# Patient Record
Sex: MALE | Race: WHITE | HISPANIC OR LATINO | Employment: OTHER | URBAN - METROPOLITAN AREA
[De-identification: names, ages, dates, MRNs, and addresses within clinical notes are randomized per-mention and may not be internally consistent; named-entity substitution may affect disease eponyms.]

---

## 2017-03-16 ENCOUNTER — APPOINTMENT (OUTPATIENT)
Dept: LAB | Facility: HOSPITAL | Age: 37
End: 2017-03-16
Payer: COMMERCIAL

## 2017-03-16 ENCOUNTER — TRANSCRIBE ORDERS (OUTPATIENT)
Dept: LAB | Facility: HOSPITAL | Age: 37
End: 2017-03-16

## 2017-03-16 DIAGNOSIS — F31.9 BIPOLAR 1 DISORDER (HCC): Primary | ICD-10-CM

## 2017-03-16 DIAGNOSIS — F31.9 BIPOLAR 1 DISORDER (HCC): ICD-10-CM

## 2017-03-16 LAB
ALBUMIN SERPL BCP-MCNC: 4.1 G/DL (ref 3.5–5)
ALP SERPL-CCNC: 66 U/L (ref 46–116)
ALT SERPL W P-5'-P-CCNC: 29 U/L (ref 12–78)
AST SERPL W P-5'-P-CCNC: 10 U/L (ref 5–45)
BASOPHILS # BLD AUTO: 0.03 THOUSANDS/ΜL (ref 0–0.1)
BASOPHILS NFR BLD AUTO: 1 % (ref 0–1)
BILIRUB DIRECT SERPL-MCNC: 0.25 MG/DL (ref 0–0.2)
BILIRUB SERPL-MCNC: 1.44 MG/DL (ref 0.2–1)
CREAT SERPL-MCNC: 0.95 MG/DL (ref 0.6–1.3)
EOSINOPHIL # BLD AUTO: 0.31 THOUSAND/ΜL (ref 0–0.61)
EOSINOPHIL NFR BLD AUTO: 5 % (ref 0–6)
ERYTHROCYTE [DISTWIDTH] IN BLOOD BY AUTOMATED COUNT: 12.3 % (ref 11.6–15.1)
EST. AVERAGE GLUCOSE BLD GHB EST-MCNC: 114 MG/DL
GFR SERPL CREATININE-BSD FRML MDRD: >60 ML/MIN/1.73SQ M
HBA1C MFR BLD: 5.6 % (ref 4.2–6.3)
HCT VFR BLD AUTO: 46.3 % (ref 36.5–49.3)
HGB BLD-MCNC: 15.7 G/DL (ref 12–17)
LITHIUM SERPL-SCNC: <0.2 MMOL/L (ref 0.5–1)
LYMPHOCYTES # BLD AUTO: 2 THOUSANDS/ΜL (ref 0.6–4.47)
LYMPHOCYTES NFR BLD AUTO: 31 % (ref 14–44)
MCH RBC QN AUTO: 31.5 PG (ref 26.8–34.3)
MCHC RBC AUTO-ENTMCNC: 33.9 G/DL (ref 31.4–37.4)
MCV RBC AUTO: 93 FL (ref 82–98)
MONOCYTES # BLD AUTO: 0.34 THOUSAND/ΜL (ref 0.17–1.22)
MONOCYTES NFR BLD AUTO: 5 % (ref 4–12)
NEUTROPHILS # BLD AUTO: 3.84 THOUSANDS/ΜL (ref 1.85–7.62)
NEUTS SEG NFR BLD AUTO: 58 % (ref 43–75)
NRBC BLD AUTO-RTO: 0 /100 WBCS
PLATELET # BLD AUTO: 290 THOUSANDS/UL (ref 149–390)
PMV BLD AUTO: 10.8 FL (ref 8.9–12.7)
PROLACTIN SERPL-MCNC: 9.8 NG/ML (ref 2.5–17.4)
PROT SERPL-MCNC: 7.2 G/DL (ref 6.4–8.2)
RBC # BLD AUTO: 4.99 MILLION/UL (ref 3.88–5.62)
T3 SERPL-MCNC: 1 NG/ML (ref 0.6–1.8)
T4 SERPL-MCNC: 7.1 UG/DL (ref 4.7–13.3)
TSH SERPL DL<=0.05 MIU/L-ACNC: 1.49 UIU/ML (ref 0.36–3.74)
WBC # BLD AUTO: 6.53 THOUSAND/UL (ref 4.31–10.16)

## 2017-03-16 PROCEDURE — 84443 ASSAY THYROID STIM HORMONE: CPT

## 2017-03-16 PROCEDURE — 80076 HEPATIC FUNCTION PANEL: CPT

## 2017-03-16 PROCEDURE — 84436 ASSAY OF TOTAL THYROXINE: CPT | Performed by: PSYCHIATRY & NEUROLOGY

## 2017-03-16 PROCEDURE — 36415 COLL VENOUS BLD VENIPUNCTURE: CPT

## 2017-03-16 PROCEDURE — 84146 ASSAY OF PROLACTIN: CPT

## 2017-03-16 PROCEDURE — 80178 ASSAY OF LITHIUM: CPT | Performed by: PSYCHIATRY & NEUROLOGY

## 2017-03-16 PROCEDURE — 85025 COMPLETE CBC W/AUTO DIFF WBC: CPT

## 2017-03-16 PROCEDURE — 82565 ASSAY OF CREATININE: CPT

## 2017-03-16 PROCEDURE — 83036 HEMOGLOBIN GLYCOSYLATED A1C: CPT

## 2017-03-16 PROCEDURE — 84480 ASSAY TRIIODOTHYRONINE (T3): CPT | Performed by: PSYCHIATRY & NEUROLOGY

## 2017-06-15 ENCOUNTER — APPOINTMENT (EMERGENCY)
Dept: RADIOLOGY | Facility: HOSPITAL | Age: 37
End: 2017-06-15
Payer: COMMERCIAL

## 2017-06-15 ENCOUNTER — HOSPITAL ENCOUNTER (EMERGENCY)
Facility: HOSPITAL | Age: 37
Discharge: HOME | End: 2017-06-15
Attending: EMERGENCY MEDICINE
Payer: COMMERCIAL

## 2017-06-15 VITALS
TEMPERATURE: 96.9 F | HEART RATE: 71 BPM | DIASTOLIC BLOOD PRESSURE: 75 MMHG | RESPIRATION RATE: 18 BRPM | WEIGHT: 179 LBS | SYSTOLIC BLOOD PRESSURE: 129 MMHG | OXYGEN SATURATION: 98 %

## 2017-06-15 DIAGNOSIS — S92.901A FOOT FRACTURE, RIGHT: Primary | ICD-10-CM

## 2017-06-15 PROCEDURE — 73610 X-RAY EXAM OF ANKLE: CPT

## 2017-06-15 PROCEDURE — 99283 EMERGENCY DEPT VISIT LOW MDM: CPT

## 2017-06-15 PROCEDURE — 73590 X-RAY EXAM OF LOWER LEG: CPT

## 2017-06-15 PROCEDURE — 73630 X-RAY EXAM OF FOOT: CPT

## 2017-06-15 RX ORDER — OXYCODONE HYDROCHLORIDE AND ACETAMINOPHEN 5; 325 MG/1; MG/1
1 TABLET ORAL EVERY 6 HOURS PRN
Qty: 15 TABLET | Refills: 0 | Status: SHIPPED | OUTPATIENT
Start: 2017-06-15 | End: 2017-06-20

## 2017-06-15 RX ORDER — OXYCODONE HYDROCHLORIDE AND ACETAMINOPHEN 5; 325 MG/1; MG/1
1 TABLET ORAL ONCE
Status: COMPLETED | OUTPATIENT
Start: 2017-06-15 | End: 2017-06-15

## 2017-06-15 RX ADMIN — OXYCODONE HYDROCHLORIDE AND ACETAMINOPHEN 1 TABLET: 5; 325 TABLET ORAL at 21:48

## 2017-07-15 ENCOUNTER — APPOINTMENT (OUTPATIENT)
Dept: LAB | Facility: HOSPITAL | Age: 37
End: 2017-07-15
Payer: COMMERCIAL

## 2017-07-15 ENCOUNTER — TRANSCRIBE ORDERS (OUTPATIENT)
Dept: LAB | Facility: HOSPITAL | Age: 37
End: 2017-07-15

## 2017-07-15 DIAGNOSIS — F31.9 BIPOLAR 1 DISORDER (HCC): Primary | ICD-10-CM

## 2017-07-15 DIAGNOSIS — F31.9 BIPOLAR 1 DISORDER (HCC): ICD-10-CM

## 2017-07-15 LAB
ALBUMIN SERPL BCP-MCNC: 4.2 G/DL (ref 3.5–5)
ALP SERPL-CCNC: 68 U/L (ref 46–116)
ALT SERPL W P-5'-P-CCNC: 18 U/L (ref 12–78)
AST SERPL W P-5'-P-CCNC: 8 U/L (ref 5–45)
BASOPHILS # BLD AUTO: 0.03 THOUSANDS/ΜL (ref 0–0.1)
BASOPHILS NFR BLD AUTO: 1 % (ref 0–1)
BILIRUB DIRECT SERPL-MCNC: 0.26 MG/DL (ref 0–0.2)
BILIRUB SERPL-MCNC: 1.57 MG/DL (ref 0.2–1)
CREAT SERPL-MCNC: 0.96 MG/DL (ref 0.6–1.3)
EOSINOPHIL # BLD AUTO: 0.39 THOUSAND/ΜL (ref 0–0.61)
EOSINOPHIL NFR BLD AUTO: 6 % (ref 0–6)
ERYTHROCYTE [DISTWIDTH] IN BLOOD BY AUTOMATED COUNT: 12.3 % (ref 11.6–15.1)
EST. AVERAGE GLUCOSE BLD GHB EST-MCNC: 117 MG/DL
GFR SERPL CREATININE-BSD FRML MDRD: >60 ML/MIN/1.73SQ M
HBA1C MFR BLD: 5.7 % (ref 4.2–6.3)
HCT VFR BLD AUTO: 44.5 % (ref 36.5–49.3)
HGB BLD-MCNC: 15.6 G/DL (ref 12–17)
LITHIUM SERPL-SCNC: 0.2 MMOL/L (ref 0.5–1)
LYMPHOCYTES # BLD AUTO: 1.87 THOUSANDS/ΜL (ref 0.6–4.47)
LYMPHOCYTES NFR BLD AUTO: 30 % (ref 14–44)
MCH RBC QN AUTO: 32 PG (ref 26.8–34.3)
MCHC RBC AUTO-ENTMCNC: 35.1 G/DL (ref 31.4–37.4)
MCV RBC AUTO: 91 FL (ref 82–98)
MONOCYTES # BLD AUTO: 0.47 THOUSAND/ΜL (ref 0.17–1.22)
MONOCYTES NFR BLD AUTO: 7 % (ref 4–12)
NEUTROPHILS # BLD AUTO: 3.55 THOUSANDS/ΜL (ref 1.85–7.62)
NEUTS SEG NFR BLD AUTO: 56 % (ref 43–75)
NRBC BLD AUTO-RTO: 0 /100 WBCS
PLATELET # BLD AUTO: 264 THOUSANDS/UL (ref 149–390)
PMV BLD AUTO: 10.9 FL (ref 8.9–12.7)
PROLACTIN SERPL-MCNC: 10.7 NG/ML (ref 2.5–17.4)
PROT SERPL-MCNC: 7.2 G/DL (ref 6.4–8.2)
RBC # BLD AUTO: 4.88 MILLION/UL (ref 3.88–5.62)
T3 SERPL-MCNC: 1.1 NG/ML (ref 0.6–1.8)
T4 SERPL-MCNC: 9.6 UG/DL (ref 4.7–13.3)
TRIGL SERPL-MCNC: 149 MG/DL
TSH SERPL DL<=0.05 MIU/L-ACNC: 1.37 UIU/ML (ref 0.36–3.74)
WBC # BLD AUTO: 6.32 THOUSAND/UL (ref 4.31–10.16)

## 2017-07-15 PROCEDURE — 80076 HEPATIC FUNCTION PANEL: CPT

## 2017-07-15 PROCEDURE — 84436 ASSAY OF TOTAL THYROXINE: CPT

## 2017-07-15 PROCEDURE — 80178 ASSAY OF LITHIUM: CPT

## 2017-07-15 PROCEDURE — 36415 COLL VENOUS BLD VENIPUNCTURE: CPT

## 2017-07-15 PROCEDURE — 84480 ASSAY TRIIODOTHYRONINE (T3): CPT

## 2017-07-15 PROCEDURE — 83036 HEMOGLOBIN GLYCOSYLATED A1C: CPT

## 2017-07-15 PROCEDURE — 85025 COMPLETE CBC W/AUTO DIFF WBC: CPT

## 2017-07-15 PROCEDURE — 84146 ASSAY OF PROLACTIN: CPT

## 2017-07-15 PROCEDURE — 82565 ASSAY OF CREATININE: CPT

## 2017-07-15 PROCEDURE — 84443 ASSAY THYROID STIM HORMONE: CPT

## 2017-07-15 PROCEDURE — 84478 ASSAY OF TRIGLYCERIDES: CPT

## 2017-08-09 ENCOUNTER — HOSPITAL ENCOUNTER (EMERGENCY)
Facility: HOSPITAL | Age: 37
Discharge: HOME | End: 2017-08-09
Attending: EMERGENCY MEDICINE | Admitting: EMERGENCY MEDICINE
Payer: COMMERCIAL

## 2017-08-09 VITALS
BODY MASS INDEX: 28.66 KG/M2 | HEIGHT: 65 IN | DIASTOLIC BLOOD PRESSURE: 66 MMHG | WEIGHT: 172 LBS | HEART RATE: 75 BPM | TEMPERATURE: 98.3 F | SYSTOLIC BLOOD PRESSURE: 131 MMHG | OXYGEN SATURATION: 99 % | RESPIRATION RATE: 18 BRPM

## 2017-08-09 DIAGNOSIS — J20.9 ACUTE BRONCHITIS: Primary | ICD-10-CM

## 2017-08-09 DIAGNOSIS — R05.9 COUGH: ICD-10-CM

## 2017-08-09 PROCEDURE — 94640 AIRWAY INHALATION TREATMENT: CPT

## 2017-08-09 PROCEDURE — 99283 EMERGENCY DEPT VISIT LOW MDM: CPT

## 2017-08-09 PROCEDURE — 96372 THER/PROPH/DIAG INJ SC/IM: CPT

## 2017-08-09 PROCEDURE — 93005 ELECTROCARDIOGRAM TRACING: CPT | Performed by: EMERGENCY MEDICINE

## 2017-08-09 RX ORDER — PROMETHAZINE HYDROCHLORIDE AND CODEINE PHOSPHATE 6.25; 1 MG/5ML; MG/5ML
5 SYRUP ORAL EVERY 4 HOURS PRN
Qty: 120 ML | Refills: 0 | Status: SHIPPED | OUTPATIENT
Start: 2017-08-09 | End: 2017-08-10

## 2017-08-09 RX ORDER — ACETAMINOPHEN 325 MG/1
650 TABLET ORAL EVERY 6 HOURS PRN
Qty: 30 TABLET | Refills: 0 | Status: SHIPPED | OUTPATIENT
Start: 2017-08-09 | End: 2017-08-10

## 2017-08-09 RX ORDER — ALBUTEROL SULFATE 90 UG/1
1-2 AEROSOL, METERED RESPIRATORY (INHALATION) EVERY 6 HOURS PRN
Qty: 1 INHALER | Refills: 0 | Status: SHIPPED | OUTPATIENT
Start: 2017-08-09 | End: 2017-08-10

## 2017-08-09 RX ORDER — KETOROLAC TROMETHAMINE 30 MG/ML
30 INJECTION, SOLUTION INTRAMUSCULAR; INTRAVENOUS ONCE
Status: COMPLETED | OUTPATIENT
Start: 2017-08-09 | End: 2017-08-09

## 2017-08-09 RX ORDER — ACETAMINOPHEN 325 MG/1
975 TABLET ORAL ONCE
Status: COMPLETED | OUTPATIENT
Start: 2017-08-09 | End: 2017-08-09

## 2017-08-09 RX ORDER — ALBUTEROL SULFATE 2.5 MG/3ML
5 SOLUTION RESPIRATORY (INHALATION) ONCE
Status: COMPLETED | OUTPATIENT
Start: 2017-08-09 | End: 2017-08-09

## 2017-08-09 RX ORDER — IBUPROFEN 600 MG/1
600 TABLET ORAL EVERY 6 HOURS PRN
Qty: 30 TABLET | Refills: 0 | Status: SHIPPED | OUTPATIENT
Start: 2017-08-09 | End: 2017-08-10

## 2017-08-09 RX ADMIN — ACETAMINOPHEN 975 MG: 325 TABLET, FILM COATED ORAL at 22:02

## 2017-08-09 RX ADMIN — DEXAMETHASONE SODIUM PHOSPHATE 10 MG: 10 INJECTION, SOLUTION INTRAMUSCULAR; INTRAVENOUS at 22:02

## 2017-08-09 RX ADMIN — KETOROLAC TROMETHAMINE 30 MG: 30 INJECTION, SOLUTION INTRAMUSCULAR at 22:02

## 2017-08-09 RX ADMIN — ALBUTEROL SULFATE 5 MG: 2.5 SOLUTION RESPIRATORY (INHALATION) at 22:03

## 2017-08-09 RX ADMIN — IPRATROPIUM BROMIDE 0.5 MG: 0.5 SOLUTION RESPIRATORY (INHALATION) at 22:03

## 2017-08-10 ENCOUNTER — HOSPITAL ENCOUNTER (EMERGENCY)
Facility: HOSPITAL | Age: 37
Discharge: HOME | End: 2017-08-10
Attending: EMERGENCY MEDICINE
Payer: COMMERCIAL

## 2017-08-10 VITALS
TEMPERATURE: 98.7 F | BODY MASS INDEX: 28.62 KG/M2 | SYSTOLIC BLOOD PRESSURE: 122 MMHG | WEIGHT: 172 LBS | HEART RATE: 84 BPM | RESPIRATION RATE: 20 BRPM | OXYGEN SATURATION: 97 % | DIASTOLIC BLOOD PRESSURE: 67 MMHG

## 2017-08-10 DIAGNOSIS — R11.2 NAUSEA AND VOMITING: Primary | ICD-10-CM

## 2017-08-10 LAB
ATRIAL RATE: 68 BPM
P AXIS: 59 DEGREES
PR INTERVAL: 184 MS
QRS AXIS: 69 DEGREES
QRSD INTERVAL: 110 MS
QT INTERVAL: 368 MS
QTC INTERVAL: 391 MS
T WAVE AXIS: 29 DEGREES
VENTRICULAR RATE: 68 BPM

## 2017-08-10 PROCEDURE — 96374 THER/PROPH/DIAG INJ IV PUSH: CPT

## 2017-08-10 PROCEDURE — 99283 EMERGENCY DEPT VISIT LOW MDM: CPT

## 2017-08-10 RX ORDER — ONDANSETRON 2 MG/ML
4 INJECTION INTRAMUSCULAR; INTRAVENOUS ONCE
Status: COMPLETED | OUTPATIENT
Start: 2017-08-10 | End: 2017-08-10

## 2017-08-10 RX ORDER — ONDANSETRON 4 MG/1
4 TABLET, FILM COATED ORAL EVERY 8 HOURS PRN
Qty: 12 TABLET | Refills: 0 | Status: SHIPPED | OUTPATIENT
Start: 2017-08-10 | End: 2019-07-30

## 2017-08-10 RX ORDER — ONDANSETRON 2 MG/ML
INJECTION INTRAMUSCULAR; INTRAVENOUS
Status: COMPLETED
Start: 2017-08-10 | End: 2017-08-10

## 2017-08-10 RX ADMIN — ONDANSETRON 4 MG: 2 INJECTION INTRAMUSCULAR; INTRAVENOUS at 22:51

## 2017-12-21 ENCOUNTER — TRANSCRIBE ORDERS (OUTPATIENT)
Dept: LAB | Facility: HOSPITAL | Age: 37
End: 2017-12-21

## 2017-12-21 ENCOUNTER — APPOINTMENT (OUTPATIENT)
Dept: LAB | Facility: HOSPITAL | Age: 37
End: 2017-12-21
Payer: COMMERCIAL

## 2017-12-21 DIAGNOSIS — F31.9 BIPOLAR 1 DISORDER (HCC): Primary | ICD-10-CM

## 2017-12-21 DIAGNOSIS — F31.9 BIPOLAR 1 DISORDER (HCC): ICD-10-CM

## 2017-12-21 LAB
ALBUMIN SERPL BCP-MCNC: 4.3 G/DL (ref 3.5–5)
ALP SERPL-CCNC: 66 U/L (ref 46–116)
ALT SERPL W P-5'-P-CCNC: 25 U/L (ref 12–78)
AST SERPL W P-5'-P-CCNC: 14 U/L (ref 5–45)
BASOPHILS # BLD AUTO: 0.03 THOUSANDS/ΜL (ref 0–0.1)
BASOPHILS NFR BLD AUTO: 1 % (ref 0–1)
BILIRUB DIRECT SERPL-MCNC: 0.26 MG/DL (ref 0–0.2)
BILIRUB SERPL-MCNC: 1.55 MG/DL (ref 0.2–1)
CREAT SERPL-MCNC: 0.92 MG/DL (ref 0.6–1.3)
EOSINOPHIL # BLD AUTO: 0.29 THOUSAND/ΜL (ref 0–0.61)
EOSINOPHIL NFR BLD AUTO: 5 % (ref 0–6)
ERYTHROCYTE [DISTWIDTH] IN BLOOD BY AUTOMATED COUNT: 12.7 % (ref 11.6–15.1)
EST. AVERAGE GLUCOSE BLD GHB EST-MCNC: 117 MG/DL
GFR SERPL CREATININE-BSD FRML MDRD: 104 ML/MIN/1.73SQ M
HBA1C MFR BLD: 5.7 % (ref 4.2–6.3)
HCT VFR BLD AUTO: 47.1 % (ref 36.5–49.3)
HGB BLD-MCNC: 16 G/DL (ref 12–17)
LITHIUM SERPL-SCNC: 0.3 MMOL/L (ref 0.5–1)
LYMPHOCYTES # BLD AUTO: 1.74 THOUSANDS/ΜL (ref 0.6–4.47)
LYMPHOCYTES NFR BLD AUTO: 30 % (ref 14–44)
MCH RBC QN AUTO: 31.8 PG (ref 26.8–34.3)
MCHC RBC AUTO-ENTMCNC: 34 G/DL (ref 31.4–37.4)
MCV RBC AUTO: 94 FL (ref 82–98)
MONOCYTES # BLD AUTO: 0.39 THOUSAND/ΜL (ref 0.17–1.22)
MONOCYTES NFR BLD AUTO: 7 % (ref 4–12)
NEUTROPHILS # BLD AUTO: 3.37 THOUSANDS/ΜL (ref 1.85–7.62)
NEUTS SEG NFR BLD AUTO: 57 % (ref 43–75)
NRBC BLD AUTO-RTO: 0 /100 WBCS
PLATELET # BLD AUTO: 290 THOUSANDS/UL (ref 149–390)
PMV BLD AUTO: 11.1 FL (ref 8.9–12.7)
PROLACTIN SERPL-MCNC: 13.7 NG/ML (ref 2.5–17.4)
PROT SERPL-MCNC: 7.6 G/DL (ref 6.4–8.2)
RBC # BLD AUTO: 5.03 MILLION/UL (ref 3.88–5.62)
T3 SERPL-MCNC: 1.1 NG/ML (ref 0.6–1.8)
T4 SERPL-MCNC: 9.5 UG/DL (ref 4.7–13.3)
TRIGL SERPL-MCNC: 110 MG/DL
TSH SERPL DL<=0.05 MIU/L-ACNC: 3.1 UIU/ML (ref 0.36–3.74)
WBC # BLD AUTO: 5.83 THOUSAND/UL (ref 4.31–10.16)

## 2017-12-21 PROCEDURE — 80178 ASSAY OF LITHIUM: CPT

## 2017-12-21 PROCEDURE — 84478 ASSAY OF TRIGLYCERIDES: CPT

## 2017-12-21 PROCEDURE — 84443 ASSAY THYROID STIM HORMONE: CPT

## 2017-12-21 PROCEDURE — 80076 HEPATIC FUNCTION PANEL: CPT

## 2017-12-21 PROCEDURE — 85025 COMPLETE CBC W/AUTO DIFF WBC: CPT

## 2017-12-21 PROCEDURE — 83036 HEMOGLOBIN GLYCOSYLATED A1C: CPT

## 2017-12-21 PROCEDURE — 84436 ASSAY OF TOTAL THYROXINE: CPT

## 2017-12-21 PROCEDURE — 36415 COLL VENOUS BLD VENIPUNCTURE: CPT

## 2017-12-21 PROCEDURE — 82565 ASSAY OF CREATININE: CPT

## 2017-12-21 PROCEDURE — 84146 ASSAY OF PROLACTIN: CPT

## 2017-12-21 PROCEDURE — 84480 ASSAY TRIIODOTHYRONINE (T3): CPT

## 2018-11-21 ENCOUNTER — APPOINTMENT (OUTPATIENT)
Dept: LAB | Facility: HOSPITAL | Age: 38
End: 2018-11-21
Payer: COMMERCIAL

## 2018-11-21 ENCOUNTER — TRANSCRIBE ORDERS (OUTPATIENT)
Dept: LAB | Facility: HOSPITAL | Age: 38
End: 2018-11-21

## 2018-11-21 DIAGNOSIS — Z13.6 SCREENING FOR ISCHEMIC HEART DISEASE: ICD-10-CM
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
## 2018-11-21 LAB
CHOLEST SERPL-MCNC: 166 MG/DL (ref 50–200)
HDLC SERPL-MCNC: 29 MG/DL (ref 40–60)
LDLC SERPL CALC-MCNC: 119 MG/DL (ref 0–100)
NONHDLC SERPL-MCNC: 137 MG/DL
TRIGL SERPL-MCNC: 90 MG/DL

## 2018-11-21 PROCEDURE — 36415 COLL VENOUS BLD VENIPUNCTURE: CPT

## 2018-11-21 PROCEDURE — 80061 LIPID PANEL: CPT

## 2019-04-01 ENCOUNTER — APPOINTMENT (OUTPATIENT)
Dept: LAB | Facility: CLINIC | Age: 39
End: 2019-04-01
Payer: MEDICARE

## 2019-04-01 ENCOUNTER — TRANSCRIBE ORDERS (OUTPATIENT)
Dept: LAB | Facility: CLINIC | Age: 39
End: 2019-04-01

## 2019-04-01 DIAGNOSIS — J45.50 SEVERE PERSISTENT ASTHMA, UNSPECIFIED WHETHER COMPLICATED: Primary | ICD-10-CM

## 2019-04-01 DIAGNOSIS — J45.50 SEVERE PERSISTENT ASTHMA, UNSPECIFIED WHETHER COMPLICATED: ICD-10-CM

## 2019-04-01 LAB
25(OH)D3 SERPL-MCNC: 19.5 NG/ML (ref 30–100)
ALBUMIN SERPL BCP-MCNC: 4.3 G/DL (ref 3.5–5)
ALP SERPL-CCNC: 72 U/L (ref 46–116)
ALT SERPL W P-5'-P-CCNC: 21 U/L (ref 12–78)
ANION GAP SERPL CALCULATED.3IONS-SCNC: 4 MMOL/L (ref 4–13)
AST SERPL W P-5'-P-CCNC: 10 U/L (ref 5–45)
BASOPHILS # BLD AUTO: 0.07 THOUSANDS/ΜL (ref 0–0.1)
BASOPHILS NFR BLD AUTO: 1 % (ref 0–1)
BILIRUB SERPL-MCNC: 1.22 MG/DL (ref 0.2–1)
BUN SERPL-MCNC: 7 MG/DL (ref 5–25)
CALCIUM SERPL-MCNC: 8.9 MG/DL (ref 8.3–10.1)
CHLORIDE SERPL-SCNC: 105 MMOL/L (ref 100–108)
CO2 SERPL-SCNC: 29 MMOL/L (ref 21–32)
CREAT SERPL-MCNC: 0.8 MG/DL (ref 0.6–1.3)
EOSINOPHIL # BLD AUTO: 0.2 THOUSAND/ΜL (ref 0–0.61)
EOSINOPHIL NFR BLD AUTO: 3 % (ref 0–6)
ERYTHROCYTE [DISTWIDTH] IN BLOOD BY AUTOMATED COUNT: 12.1 % (ref 11.6–15.1)
GFR SERPL CREATININE-BSD FRML MDRD: 111 ML/MIN/1.73SQ M
GLUCOSE SERPL-MCNC: 85 MG/DL (ref 65–140)
HCT VFR BLD AUTO: 44.7 % (ref 36.5–49.3)
HGB BLD-MCNC: 14.9 G/DL (ref 12–17)
IMM GRANULOCYTES # BLD AUTO: 0.01 THOUSAND/UL (ref 0–0.2)
IMM GRANULOCYTES NFR BLD AUTO: 0 % (ref 0–2)
LYMPHOCYTES # BLD AUTO: 1.88 THOUSANDS/ΜL (ref 0.6–4.47)
LYMPHOCYTES NFR BLD AUTO: 32 % (ref 14–44)
MCH RBC QN AUTO: 31.2 PG (ref 26.8–34.3)
MCHC RBC AUTO-ENTMCNC: 33.3 G/DL (ref 31.4–37.4)
MCV RBC AUTO: 94 FL (ref 82–98)
MONOCYTES # BLD AUTO: 0.4 THOUSAND/ΜL (ref 0.17–1.22)
MONOCYTES NFR BLD AUTO: 7 % (ref 4–12)
NEUTROPHILS # BLD AUTO: 3.26 THOUSANDS/ΜL (ref 1.85–7.62)
NEUTS SEG NFR BLD AUTO: 57 % (ref 43–75)
NRBC BLD AUTO-RTO: 0 /100 WBCS
PLATELET # BLD AUTO: 260 THOUSANDS/UL (ref 149–390)
PMV BLD AUTO: 10.8 FL (ref 8.9–12.7)
POTASSIUM SERPL-SCNC: 3.8 MMOL/L (ref 3.5–5.3)
PROT SERPL-MCNC: 7 G/DL (ref 6.4–8.2)
RBC # BLD AUTO: 4.77 MILLION/UL (ref 3.88–5.62)
SODIUM SERPL-SCNC: 138 MMOL/L (ref 136–145)
WBC # BLD AUTO: 5.82 THOUSAND/UL (ref 4.31–10.16)

## 2019-04-01 PROCEDURE — 86003 ALLG SPEC IGE CRUDE XTRC EA: CPT

## 2019-04-01 PROCEDURE — 85025 COMPLETE CBC W/AUTO DIFF WBC: CPT

## 2019-04-01 PROCEDURE — 82785 ASSAY OF IGE: CPT

## 2019-04-01 PROCEDURE — 82306 VITAMIN D 25 HYDROXY: CPT

## 2019-04-01 PROCEDURE — 80053 COMPREHEN METABOLIC PANEL: CPT

## 2019-04-01 PROCEDURE — 36415 COLL VENOUS BLD VENIPUNCTURE: CPT

## 2019-04-02 LAB
A ALTERNATA IGE QN: <0.1 KUA/I
A FUMIGATUS IGE QN: <0.1 KUA/I
ALLERGEN COMMENT: NORMAL
BERMUDA GRASS IGE QN: <0.1 KUA/I
BOXELDER IGE QN: <0.1 KUA/I
C HERBARUM IGE QN: <0.1 KUA/I
CAT DANDER IGE QN: <0.1 KUA/I
CMN PIGWEED IGE QN: <0.1 KUA/I
COMMON RAGWEED IGE QN: <0.1 KUA/I
COTTONWOOD IGE QN: <0.1 KUA/I
D FARINAE IGE QN: <0.1 KUA/I
D PTERONYSS IGE QN: <0.1 KUA/I
DOG DANDER IGE QN: <0.1 KUA/I
LONDON PLANE IGE QN: <0.1 KUA/I
MOUSE URINE PROT IGE QN: <0.1 KUA/I
MT JUNIPER IGE QN: <0.1 KUA/I
MUGWORT IGE QN: <0.1 KUA/I
P NOTATUM IGE QN: <0.1 KUA/I
ROACH IGE QN: <0.1 KUA/I
SHEEP SORREL IGE QN: <0.1 KUA/I
SILVER BIRCH IGE QN: <0.1 KUA/I
TIMOTHY IGE QN: <0.1 KUA/I
TOTAL IGE SMQN RAST: 20.6 KU/L (ref 0–113)
WALNUT IGE QN: <0.1 KUA/I
WHITE ASH IGE QN: <0.1 KUA/I
WHITE ELM IGE QN: <0.1 KUA/I
WHITE MULBERRY IGE QN: <0.1 KUA/I
WHITE OAK IGE QN: <0.1 KUA/I

## 2019-07-30 ENCOUNTER — HOSPITAL ENCOUNTER (EMERGENCY)
Facility: HOSPITAL | Age: 39
Discharge: HOME | End: 2019-07-30
Attending: EMERGENCY MEDICINE
Payer: MEDICARE

## 2019-07-30 VITALS
RESPIRATION RATE: 18 BRPM | DIASTOLIC BLOOD PRESSURE: 84 MMHG | WEIGHT: 183.2 LBS | BODY MASS INDEX: 30.49 KG/M2 | SYSTOLIC BLOOD PRESSURE: 150 MMHG | TEMPERATURE: 97.9 F | OXYGEN SATURATION: 97 % | HEART RATE: 88 BPM

## 2019-07-30 DIAGNOSIS — J02.9 PHARYNGITIS: Primary | ICD-10-CM

## 2019-07-30 LAB — S PYO AG THROAT QL: NEGATIVE

## 2019-07-30 PROCEDURE — 99283 EMERGENCY DEPT VISIT LOW MDM: CPT | Performed by: PHYSICIAN ASSISTANT

## 2019-07-30 PROCEDURE — 99283 EMERGENCY DEPT VISIT LOW MDM: CPT

## 2019-07-30 PROCEDURE — 87430 STREP A AG IA: CPT | Performed by: PHYSICIAN ASSISTANT

## 2019-07-30 RX ORDER — ALBUTEROL SULFATE 90 UG/1
1 AEROSOL, METERED RESPIRATORY (INHALATION) EVERY 6 HOURS PRN
COMMUNITY
Start: 2019-03-11

## 2019-07-30 RX ORDER — FLUTICASONE FUROATE AND VILANTEROL 200; 25 UG/1; UG/1
1 POWDER RESPIRATORY (INHALATION) DAILY
COMMUNITY
Start: 2018-08-20

## 2019-07-30 RX ORDER — ACETAMINOPHEN 500 MG
500 TABLET ORAL EVERY 6 HOURS PRN
Qty: 30 TABLET | Refills: 0 | Status: SHIPPED | OUTPATIENT
Start: 2019-07-30

## 2019-07-30 RX ORDER — CITALOPRAM 20 MG/1
20 TABLET ORAL DAILY
COMMUNITY

## 2019-07-30 NOTE — ED PROVIDER NOTES
History  Chief Complaint   Patient presents with    Sore Throat     Patient complains of sore throat since yesterday  Denies any cough or fevers  [facility] is a 38 yo M presenting with sore throat x1 day  States symptoms began gradually and have worsened since onset  Denies rhinorrhea, nasal congestion, cough  Denies fevers/chills  No meds PTA for pain  No known sick contacts  No recent travel  Has tried throat lozenges with mild relief  History provided by:  Patient   used: No    Sore Throat   Location:  Generalized  Quality:  Sore  Onset quality:  Gradual  Duration:  1 day  Timing:  Constant  Progression:  Worsening  Chronicity:  New  Relieved by:  OTC medications  Associated symptoms: no abdominal pain, no chest pain, no chills, no cough, no ear discharge, no ear pain, no fever, no headaches, no neck stiffness, no rash, no rhinorrhea, no shortness of breath, no trouble swallowing and no voice change    Risk factors: no exposure to strep, no exposure to mono and no sick contacts        Prior to Admission Medications   Prescriptions Last Dose Informant Patient Reported? Taking? albuterol (VENTOLIN HFA) 90 mcg/act inhaler   Yes Yes   Sig: Inhale 1 puff every 6 (six) hours as needed   citalopram (CeleXA) 20 mg tablet   Yes No   Sig: Take 20 mg by mouth daily   fluticasone-vilanterol (BREO ELLIPTA) 200-25 MCG/INH inhaler   Yes Yes   Sig: Inhale 1 puff daily   tiotropium (SPIRIVA RESPIMAT) 1 25 MCG/ACT AERS inhaler   Yes Yes   Sig: Inhale 2 5 mcg daily      Facility-Administered Medications: None       Past Medical History:   Diagnosis Date    Asthma     Mitral valve prolapse        History reviewed  No pertinent surgical history  History reviewed  No pertinent family history  I have reviewed and agree with the history as documented      Social History     Tobacco Use    Smoking status: Never Smoker    Smokeless tobacco: Never Used   Substance Use Topics    Alcohol use: Not Currently     Comment: socially    Drug use: No        Review of Systems   Constitutional: Negative for chills and fever  HENT: Positive for sore throat  Negative for congestion, ear discharge, ear pain, mouth sores, rhinorrhea, sinus pressure, sinus pain, trouble swallowing and voice change  Eyes: Negative for pain, redness and visual disturbance  Respiratory: Negative for cough, chest tightness, shortness of breath and wheezing  Cardiovascular: Negative for chest pain and palpitations  Gastrointestinal: Negative for abdominal pain, constipation, diarrhea, nausea and vomiting  Genitourinary: Negative for dysuria, frequency and urgency  Musculoskeletal: Negative for myalgias, neck pain and neck stiffness  Skin: Negative for pallor, rash and wound  Neurological: Negative for dizziness, weakness, light-headedness, numbness and headaches  Physical Exam  Physical Exam   Constitutional: He is oriented to person, place, and time  He appears well-developed and well-nourished  No distress  HENT:   Head: Normocephalic and atraumatic  Right Ear: Tympanic membrane, external ear and ear canal normal    Left Ear: Tympanic membrane, external ear and ear canal normal    Nose: Nose normal    Mouth/Throat: Posterior oropharyngeal erythema present  No oropharyngeal exudate, posterior oropharyngeal edema or tonsillar abscesses  Tonsils are 0 on the right  Tonsils are 0 on the left  No tonsillar exudate  Eyes: Pupils are equal, round, and reactive to light  Conjunctivae and EOM are normal  Right eye exhibits no discharge  Left eye exhibits no discharge  Neck: Normal range of motion  Neck supple  Cardiovascular: Normal rate, regular rhythm and normal heart sounds  Exam reveals no gallop and no friction rub  No murmur heard  Pulmonary/Chest: Effort normal and breath sounds normal  No stridor  No respiratory distress  He has no wheezes  He has no rales  Abdominal: Soft   Bowel sounds are normal  He exhibits no distension  There is no tenderness  There is no guarding  Lymphadenopathy:     He has no cervical adenopathy  Neurological: He is alert and oriented to person, place, and time  He exhibits normal muscle tone  Coordination normal    Skin: Skin is warm and dry  Capillary refill takes less than 2 seconds  No rash noted  He is not diaphoretic  No erythema  No pallor  Psychiatric: He has a normal mood and affect  His behavior is normal  Judgment and thought content normal    Nursing note and vitals reviewed  Vital Signs  ED Triage Vitals [07/30/19 0934]   Temperature Pulse Respirations Blood Pressure SpO2   97 9 °F (36 6 °C) 88 18 150/84 97 %      Temp Source Heart Rate Source Patient Position - Orthostatic VS BP Location FiO2 (%)   Oral Monitor Sitting Right arm --      Pain Score       Worst Possible Pain           Vitals:    07/30/19 0934   BP: 150/84   Pulse: 88   Patient Position - Orthostatic VS: Sitting         Visual Acuity      ED Medications  Medications - No data to display    Diagnostic Studies  Results Reviewed     Procedure Component Value Units Date/Time    Rapid Strep A Screen Only, Adults [[id number]]  (Normal) Collected:  07/30/19 1006    Lab Status:  Final result Specimen:  Throat Updated:  07/30/19 1027     Rapid Strep A Screen Negative                 No orders to display              Procedures  Procedures       ED Course                               MDM  Number of Diagnoses or Management Options  Pharyngitis:   Diagnosis management comments: Negative rapid strep, Centor score of 1  No evidence of PTA, handling secretions, afebrile   Supportive therapy at home, f/u with PCP if symptoms persist        Amount and/or Complexity of Data Reviewed  Clinical lab tests: ordered and reviewed    Patient Progress  Patient progress: stable      Disposition  Final diagnoses:   Pharyngitis     Time reflects when diagnosis was documented in both MDM as applicable and the Disposition within this note     Time User Action Codes Description Comment    7/30/2019 10:43 AM Rosa Isela Sosa Add [J02 9] Pharyngitis       ED Disposition     ED Disposition Condition Date/Time Comment    Discharge Stable Tue Jul 30, 2019 10:43 AM Eliodoro Sees reyes discharge to home/self care  Follow-up Information     Follow up With Specialties Details Why [address], DO Family Medicine  As needed [address] [facility] [address]  [phone]            Patient's Medications   Discharge Prescriptions    ACETAMINOPHEN (TYLENOL) 500 MG TABLET    Take 1 tablet (500 mg total) by mouth every 6 (six) hours as needed for mild pain or moderate pain       Start Date: 7/30/2019 End Date: --       Order Dose: 500 mg       Quantity: 30 tablet    Refills: 0    MENTHOL-CETYLPYRIDINIUM (CEPACOL) 3 MG LOZENGE    Take 1 lozenge (3 mg total) by mouth as needed for sore throat       Start Date: 7/30/2019 End Date: --       Order Dose: 3 mg       Quantity: 30 lozenge    Refills: 0     No discharge procedures on file      ED Provider  Electronically Signed by           Marie Troy PA-C  07/30/19 5606

## 2019-09-25 ENCOUNTER — HOSPITAL ENCOUNTER (EMERGENCY)
Facility: HOSPITAL | Age: 39
Discharge: HOME | End: 2019-09-25
Attending: EMERGENCY MEDICINE | Admitting: EMERGENCY MEDICINE
Payer: MEDICARE

## 2019-09-25 VITALS
TEMPERATURE: 98.4 F | OXYGEN SATURATION: 99 % | WEIGHT: 186 LBS | HEART RATE: 80 BPM | DIASTOLIC BLOOD PRESSURE: 81 MMHG | BODY MASS INDEX: 30.95 KG/M2 | RESPIRATION RATE: 18 BRPM | SYSTOLIC BLOOD PRESSURE: 134 MMHG

## 2019-09-25 DIAGNOSIS — M54.50 ACUTE LOW BACK PAIN: Primary | ICD-10-CM

## 2019-09-25 DIAGNOSIS — M62.830 SPASM OF MUSCLE OF LOWER BACK: ICD-10-CM

## 2019-09-25 PROCEDURE — 99284 EMERGENCY DEPT VISIT MOD MDM: CPT | Performed by: PHYSICIAN ASSISTANT

## 2019-09-25 PROCEDURE — 96372 THER/PROPH/DIAG INJ SC/IM: CPT

## 2019-09-25 PROCEDURE — 99283 EMERGENCY DEPT VISIT LOW MDM: CPT

## 2019-09-25 RX ORDER — METHYLPREDNISOLONE 4 MG/1
TABLET ORAL
Qty: 21 TABLET | Refills: 0 | Status: SHIPPED | OUTPATIENT
Start: 2019-09-25 | End: 2020-01-31

## 2019-09-25 RX ORDER — DIAZEPAM 5 MG/ML
5 INJECTION, SOLUTION INTRAMUSCULAR; INTRAVENOUS ONCE
Status: COMPLETED | OUTPATIENT
Start: 2019-09-25 | End: 2019-09-25

## 2019-09-25 RX ORDER — KETOROLAC TROMETHAMINE 30 MG/ML
15 INJECTION, SOLUTION INTRAMUSCULAR; INTRAVENOUS ONCE
Status: COMPLETED | OUTPATIENT
Start: 2019-09-25 | End: 2019-09-25

## 2019-09-25 RX ORDER — BACLOFEN 10 MG/1
10 TABLET ORAL 3 TIMES DAILY
Qty: 12 TABLET | Refills: 0 | Status: SHIPPED | OUTPATIENT
Start: 2019-09-25 | End: 2019-09-29

## 2019-09-25 RX ADMIN — DIAZEPAM 5 MG: 10 INJECTION, SOLUTION INTRAMUSCULAR; INTRAVENOUS at 12:02

## 2019-09-25 RX ADMIN — KETOROLAC TROMETHAMINE 15 MG: 30 INJECTION, SOLUTION INTRAMUSCULAR at 12:01

## 2019-09-25 NOTE — ED PROVIDER NOTES
History  Chief Complaint   Patient presents with    Back Pain     Pt c/o L lower back pain with radiation to the middle  Pain x1 day  This is a 68-year-old male patient who yesterday was putting on his pants and stated his back gave out has pain to right side of his back referred symptoms into his right buttock no radicular symptoms  Nursing notes stated that it radiated up that does not seem to be the case  No change in bowel or bladder no saddle anesthesia no numbness or tingling no weakness  Made worse with movement made better when he holds still  Has taken nothing for the pain  No fever no chills no headache blurred vision double vision cough congestion sore throat nausea vomiting diarrhea abdominal pain no chest pain or shortness of breath  This pain is not sharp or tearing  No urgency frequency or dysuria no CVA tenderness          Prior to Admission Medications   Prescriptions Last Dose Informant Patient Reported? Taking?   acetaminophen (TYLENOL) 500 mg tablet   No No   Sig: Take 1 tablet (500 mg total) by mouth every 6 (six) hours as needed for mild pain or moderate pain   albuterol (VENTOLIN HFA) 90 mcg/act inhaler   Yes No   Sig: Inhale 1 puff every 6 (six) hours as needed   citalopram (CeleXA) 20 mg tablet   Yes No   Sig: Take 20 mg by mouth daily   fluticasone-vilanterol (BREO ELLIPTA) 200-25 MCG/INH inhaler   Yes No   Sig: Inhale 1 puff daily   menthol-cetylpyridinium (CEPACOL) 3 MG lozenge   No No   Sig: Take 1 lozenge (3 mg total) by mouth as needed for sore throat   tiotropium (SPIRIVA RESPIMAT) 1 25 MCG/ACT AERS inhaler   Yes No   Sig: Inhale 2 5 mcg daily      Facility-Administered Medications: None       Past Medical History:   Diagnosis Date    Asthma     Mitral valve prolapse        History reviewed  No pertinent surgical history  History reviewed  No pertinent family history  I have reviewed and agree with the history as documented      Social History     Tobacco Use    Smoking status: Never Smoker    Smokeless tobacco: Never Used   Substance Use Topics    Alcohol use: Not Currently     Comment: socially    Drug use: No        Review of Systems   All other systems reviewed and are negative  Physical Exam  Physical Exam   Constitutional: He appears well-developed and well-nourished  HENT:   Head: Normocephalic and atraumatic  Right Ear: External ear normal    Left Ear: External ear normal    Nose: Nose normal    Mouth/Throat: Oropharynx is clear and moist    Eyes: Pupils are equal, round, and reactive to light  Conjunctivae are normal    Neck: Normal range of motion  Neck supple  Cardiovascular: Normal rate and regular rhythm  Pulmonary/Chest: Effort normal and breath sounds normal    Abdominal: Soft  Bowel sounds are normal  There is no tenderness  Musculoskeletal:        Back:    Neurological: He is alert  Skin: Skin is warm  Psychiatric: He has a normal mood and affect  His behavior is normal    Nursing note and vitals reviewed        Vital Signs  ED Triage Vitals [09/25/19 1105]   Temperature Pulse Respirations Blood Pressure SpO2   98 3 °F (36 8 °C) 77 20 147/82 97 %      Temp Source Heart Rate Source Patient Position - Orthostatic VS BP Location FiO2 (%)   Oral Monitor Sitting Left arm --      Pain Score       Worst Possible Pain           Vitals:    09/25/19 1105 09/25/19 1111   BP: 147/82 134/81   Pulse: 77 80   Patient Position - Orthostatic VS: Sitting Sitting         Visual Acuity      ED Medications  Medications   diazepam (VALIUM) injection 5 mg (has no administration in time range)   ketorolac (TORADOL) injection 15 mg (has no administration in time range)       Diagnostic Studies  Results Reviewed     None                 No orders to display              Procedures  Procedures       ED Course                               MDM    Disposition  Final diagnoses:   Acute low back pain   Spasm of muscle of lower back     Time reflects when diagnosis was documented in both MDM as applicable and the Disposition within this note     Time User Action Codes Description Comment    9/25/2019 11:55 AM Xavi Ruelas [M54 5] Acute low back pain     9/25/2019 11:55 AM Xavi Ruelas [M62 830] Spasm of muscle of lower back       ED Disposition     ED Disposition Condition Date/Time Comment    Discharge Stable Wed Sep 25, 2019 11:31 AM Minnette Oliphant reyes discharge to home/self care  Follow-up Information     Follow up With Specialties Details Why [address] [facility]   [address] [place] [address]  [phone]            Patient's Medications   Discharge Prescriptions    BACLOFEN 10 MG TABLET    Take 1 tablet (10 mg total) by mouth 3 (three) times a day for 4 days       Start Date: 9/25/2019 End Date: 9/29/2019       Order Dose: 10 mg       Quantity: 12 tablet    Refills: 0    METHYLPREDNISOLONE 4 MG TABLET THERAPY PACK    Use as directed on package       Start Date: 9/25/2019 End Date: --       Order Dose: --       Quantity: 21 tablet    Refills: 0     No discharge procedures on file      ED Provider  Electronically Signed by           Darren Palma PA-C  09/25/19 9505

## 2019-09-27 ENCOUNTER — HOSPITAL ENCOUNTER (EMERGENCY)
Facility: HOSPITAL | Age: 39
Discharge: HOME | End: 2019-09-27
Attending: EMERGENCY MEDICINE
Payer: MEDICARE

## 2019-09-27 VITALS
OXYGEN SATURATION: 100 % | HEART RATE: 78 BPM | TEMPERATURE: 98.2 F | RESPIRATION RATE: 18 BRPM | SYSTOLIC BLOOD PRESSURE: 136 MMHG | DIASTOLIC BLOOD PRESSURE: 66 MMHG

## 2019-09-27 DIAGNOSIS — M54.50 LOW BACK PAIN: Primary | ICD-10-CM

## 2019-09-27 PROCEDURE — 99283 EMERGENCY DEPT VISIT LOW MDM: CPT

## 2019-09-27 PROCEDURE — 99284 EMERGENCY DEPT VISIT MOD MDM: CPT | Performed by: EMERGENCY MEDICINE

## 2019-09-27 PROCEDURE — 96372 THER/PROPH/DIAG INJ SC/IM: CPT

## 2019-09-27 RX ORDER — KETOROLAC TROMETHAMINE 30 MG/ML
15 INJECTION, SOLUTION INTRAMUSCULAR; INTRAVENOUS ONCE
Status: COMPLETED | OUTPATIENT
Start: 2019-09-27 | End: 2019-09-27

## 2019-09-27 RX ORDER — LIDOCAINE 50 MG/G
1 PATCH TOPICAL ONCE
Status: DISCONTINUED | OUTPATIENT
Start: 2019-09-27 | End: 2019-09-27 | Stop reason: HOSPADM

## 2019-09-27 RX ORDER — DIAZEPAM 5 MG/1
5 TABLET ORAL EVERY 8 HOURS PRN
Qty: 10 TABLET | Refills: 0 | Status: SHIPPED | OUTPATIENT
Start: 2019-09-27 | End: 2020-01-31

## 2019-09-27 RX ORDER — ACETAMINOPHEN 325 MG/1
975 TABLET ORAL ONCE
Status: COMPLETED | OUTPATIENT
Start: 2019-09-27 | End: 2019-09-27

## 2019-09-27 RX ADMIN — ACETAMINOPHEN 975 MG: 325 TABLET ORAL at 20:42

## 2019-09-27 RX ADMIN — KETOROLAC TROMETHAMINE 15 MG: 30 INJECTION, SOLUTION INTRAMUSCULAR at 20:44

## 2019-09-27 RX ADMIN — LIDOCAINE 1 PATCH: 50 PATCH CUTANEOUS at 20:42

## 2019-09-28 NOTE — ED ATTENDING ATTESTATION
9/27/2019  ILul MD, saw and evaluated the patient  I have discussed the patient with the resident/non-physician practitioner and agree with the resident's/non-physician practitioner's findings, Plan of Care, and MDM as documented in the resident's/non-physician practitioner's note, except where noted  All available labs and Radiology studies were reviewed  I was present for key portions of any procedure(s) performed by the resident/non-physician practitioner and I was immediately available to provide assistance  At this point I agree with the current assessment done in the Emergency Department  I have conducted an independent evaluation of this patient a history and physical is as follows:   The patient presents for evaluation of back pain he injured his back several days ago when he was bending over to put his pants on the patient has no radicular symptoms he has no weakness in the legs he has no bowel or bladder difficulty is no fever chills he has no abdominal pain pain is worse if he tries to sit up or twist or bend he has no pain if he is still  ROS:    no fever,   no IVDA,    no cancer history,   no abd pain,  no radiation of pain,     no weakness in legs,  no numbness,   no parathesias in groin or perineal area ,    no bowel or bladder difficulty,   Exam:  Const:  Well appearing NAD     Neck:  supple, no JVD    Lungs:  clear    Heart:  RRR no m/g/r    Abd:   soft nt nd pos bs  no pulsitle mass, no bruits,  no palpable bladder      Back:    no midline tenderness        tender in paraspinal musculatrue left and right    worse to bend and twist       Skin:     no skin, rash warm and dry  Neuro:   motor 5/5  all muscle groups ,  sensory intact,  DTRs normal and symmetric     straight leg rasing  negative   Gait normal able  to ambulate  Toe heel walking  normal   Impression :   Musculoskeletal Back Pain                 ED Course         Critical Care Time  Procedures

## 2019-09-28 NOTE — ED PROVIDER NOTES
History  Chief Complaint   Patient presents with    Back Pain     Patient reports low back pain x5 days; pt denies fall/injury; pt states pain is constant unless laying down; pt had injections on Tuesday with no relief     Patient is a 49-year-old male who presents for right paraspinal lumbar back pain  Patient was seen here 2 days ago for the same  Patient says that it occurred on Sunday after he put on his pants  He says the pain started about 5 minutes after  Patient says the pain has been progressive in nature  Patient says that the pain has not changed since 2 days ago  He says that it radiates throughout his lumbar back  He denies radiation into his leg  Patient has been able to walk  He denies any numbness or tingling in his legs, bowel or bladder incontinence, urinary retention, fevers or chills  Patient denies any trauma to his back  Prior to Admission Medications   Prescriptions Last Dose Informant Patient Reported?  Taking?   acetaminophen (TYLENOL) 500 mg tablet   No Yes   Sig: Take 1 tablet (500 mg total) by mouth every 6 (six) hours as needed for mild pain or moderate pain   albuterol (VENTOLIN HFA) 90 mcg/act inhaler   Yes Yes   Sig: Inhale 1 puff every 6 (six) hours as needed   baclofen 10 mg tablet   No Yes   Sig: Take 1 tablet (10 mg total) by mouth 3 (three) times a day for 4 days   citalopram (CeleXA) 20 mg tablet Not Taking at Unknown time  Yes No   Sig: Take 20 mg by mouth daily   fluticasone-vilanterol (BREO ELLIPTA) 200-25 MCG/INH inhaler   Yes Yes   Sig: Inhale 1 puff daily   menthol-cetylpyridinium (CEPACOL) 3 MG lozenge Not Taking at Unknown time  No No   Sig: Take 1 lozenge (3 mg total) by mouth as needed for sore throat   Patient not taking: Reported on 9/27/2019   methylPREDNISolone 4 MG tablet therapy pack Not Taking at Unknown time  No No   Sig: Use as directed on package   Patient not taking: Reported on 9/27/2019   tiotropium (SPIRIVA RESPIMAT) 1 25 MCG/ACT AERS inhaler   Yes Yes   Sig: Inhale 2 5 mcg daily      Facility-Administered Medications: None       Past Medical History:   Diagnosis Date    Asthma     Mitral valve prolapse        History reviewed  No pertinent surgical history  History reviewed  No pertinent family history  I have reviewed and agree with the history as documented  Social History     Tobacco Use    Smoking status: Never Smoker    Smokeless tobacco: Never Used   Substance Use Topics    Alcohol use: Not Currently     Comment: socially    Drug use: No        Review of Systems   Constitutional: Negative for chills, fever and unexpected weight change  HENT: Negative for congestion, sore throat and trouble swallowing  Eyes: Negative for pain, discharge and itching  Respiratory: Negative for cough, chest tightness, shortness of breath and wheezing  Cardiovascular: Negative for chest pain, palpitations and leg swelling  Gastrointestinal: Negative for abdominal pain, blood in stool, diarrhea, nausea and vomiting  Endocrine: Negative for polyuria  Genitourinary: Negative for difficulty urinating, dysuria, frequency and hematuria  Musculoskeletal: Positive for back pain  Negative for arthralgias  Skin: Negative for color change and rash  Neurological: Negative for dizziness, syncope, weakness, light-headedness and headaches  Physical Exam  ED Triage Vitals [09/27/19 2016]   Temperature Pulse Respirations Blood Pressure SpO2   98 2 °F (36 8 °C) 77 18 149/73 98 %      Temp Source Heart Rate Source Patient Position - Orthostatic VS BP Location FiO2 (%)   Oral Monitor Lying Right arm --      Pain Score       Worst Possible Pain             Orthostatic Vital Signs  Vitals:    09/27/19 2016 09/27/19 2030   BP: 149/73 136/66   Pulse: 77 78   Patient Position - Orthostatic VS: Lying Lying       Physical Exam   Constitutional: He is oriented to person, place, and time  He appears well-developed and well-nourished  No distress  HENT:   Head: Normocephalic and atraumatic  Right Ear: External ear normal    Left Ear: External ear normal    Eyes: Pupils are equal, round, and reactive to light  Conjunctivae and EOM are normal    Neck: Normal range of motion  Cardiovascular: Normal rate, regular rhythm, normal heart sounds and intact distal pulses  Exam reveals no gallop and no friction rub  No murmur heard  Pulmonary/Chest: Effort normal and breath sounds normal  No respiratory distress  He has no wheezes  He has no rales  Abdominal: Soft  Bowel sounds are normal  He exhibits no distension  There is no tenderness  There is no guarding  Musculoskeletal: Normal range of motion  He exhibits tenderness (R paraspinal lumbar back)  He exhibits no edema or deformity  Lymphadenopathy:     He has no cervical adenopathy  Neurological: He is alert and oriented to person, place, and time  No cranial nerve deficit or sensory deficit  He exhibits normal muscle tone  Skin: Skin is warm and dry  Psychiatric: He has a normal mood and affect  His behavior is normal    Nursing note and vitals reviewed  ED Medications  Medications   lidocaine (LIDODERM) 5 % patch 1 patch (1 patch Topical Medication Applied 9/27/19 2042)   acetaminophen (TYLENOL) tablet 975 mg (975 mg Oral Given 9/27/19 2042)   ketorolac (TORADOL) injection 15 mg (15 mg Intramuscular Given 9/27/19 2044)       Diagnostic Studies  Results Reviewed     None                 No orders to display         Procedures  Procedures        ED Course                               MDM  Number of Diagnoses or Management Options  Low back pain:   Diagnosis management comments: 42-year-old male presenting for right paraspinal lumbar back pain  Started on Sunday  Seen 2 days ago  Was given Robaxin and steroids  Patient says he has a have the pain  Denies any recent trauma to his back  Worse with bending  No focal neurologic signs  No red flag symptoms    Patient given Toradol, Lidoderm patch and Tylenol  Patient given script for Valium, told to discontinue baclofen  Given referral for [facility]       Disposition  Final diagnoses:   Low back pain     Time reflects when diagnosis was documented in both MDM as applicable and the Disposition within this note     Time User Action Codes Description Comment    9/27/2019  8:42 PM Charbel Akhtar Add [M54 5] Low back pain       ED Disposition     ED Disposition Condition Date/Time Comment    Discharge Stable Fri Sep 27, 2019  8:42 PM Tabitha Uribe reyes discharge to home/self care              Follow-up Information    None         Discharge Medication List as of 9/27/2019  8:44 PM      START taking these medications    Details   diazepam (VALIUM) 5 mg tablet Take 1 tablet (5 mg total) by mouth every 8 (eight) hours as needed for muscle spasms for up to 10 days, Starting Fri 9/27/2019, Until Mon 10/7/2019, Print         CONTINUE these medications which have NOT CHANGED    Details   acetaminophen (TYLENOL) 500 mg tablet Take 1 tablet (500 mg total) by mouth every 6 (six) hours as needed for mild pain or moderate pain, Starting Tue 7/30/2019, Print      albuterol (VENTOLIN HFA) 90 mcg/act inhaler Inhale 1 puff every 6 (six) hours as needed, Starting Mon 3/11/2019, Historical Med      baclofen 10 mg tablet Take 1 tablet (10 mg total) by mouth 3 (three) times a day for 4 days, Starting Wed 9/25/2019, Until Sun 9/29/2019, Print      fluticasone-vilanterol (BREO ELLIPTA) 200-25 MCG/INH inhaler Inhale 1 puff daily, Starting Mon 8/20/2018, Historical Med      tiotropium (SPIRIVA RESPIMAT) 1 25 MCG/ACT AERS inhaler Inhale 2 5 mcg daily, Starting Mon 8/20/2018, Historical Med      citalopram (CeleXA) 20 mg tablet Take 20 mg by mouth daily, Historical Med      menthol-cetylpyridinium (CEPACOL) 3 MG lozenge Take 1 lozenge (3 mg total) by mouth as needed for sore throat, Starting Tue 7/30/2019, Print      methylPREDNISolone 4 MG tablet therapy pack Use as directed on package, Print               ED Provider  Attending physically available and evaluated [place] reyes I managed the patient along with the ED Attending      Electronically Signed by         Gerson Heck DO  09/27/19 5471

## 2019-09-30 ENCOUNTER — TELEPHONE (OUTPATIENT)
Dept: PHYSICAL THERAPY | Facility: OTHER | Age: 39
End: 2019-09-30

## 2019-09-30 NOTE — TELEPHONE ENCOUNTER
Nurse spoke to patient about referral and [facility] and offerings  Patient stated he was interested, but he was in the store shopping at the moment and asked if he could call nurse back  Informed patient of possible need to leave a VM and instructed to please leave his full name,  and good time to call  Patient agreed and appreciative of call      Referral closed

## 2020-01-22 ENCOUNTER — APPOINTMENT (OUTPATIENT)
Dept: LAB | Facility: CLINIC | Age: 40
End: 2020-01-22
Payer: MEDICARE

## 2020-01-22 DIAGNOSIS — Z13.6 SCREENING FOR CARDIOVASCULAR CONDITION: Primary | ICD-10-CM

## 2020-01-22 LAB
CHOLEST SERPL-MCNC: 190 MG/DL (ref 50–200)
HDLC SERPL-MCNC: 33 MG/DL
LDLC SERPL CALC-MCNC: 138 MG/DL (ref 0–100)
NONHDLC SERPL-MCNC: 157 MG/DL
TRIGL SERPL-MCNC: 97 MG/DL

## 2020-01-22 PROCEDURE — 36415 COLL VENOUS BLD VENIPUNCTURE: CPT

## 2020-01-22 PROCEDURE — 80061 LIPID PANEL: CPT

## 2020-01-31 ENCOUNTER — HOSPITAL ENCOUNTER (EMERGENCY)
Facility: HOSPITAL | Age: 40
Discharge: HOME | End: 2020-02-01
Attending: EMERGENCY MEDICINE | Admitting: EMERGENCY MEDICINE
Payer: MEDICARE

## 2020-01-31 DIAGNOSIS — R51.9 HEADACHE: Primary | ICD-10-CM

## 2020-01-31 DIAGNOSIS — R03.0 ELEVATED BLOOD PRESSURE READING: ICD-10-CM

## 2020-01-31 PROCEDURE — 99284 EMERGENCY DEPT VISIT MOD MDM: CPT | Performed by: EMERGENCY MEDICINE

## 2020-01-31 PROCEDURE — 96372 THER/PROPH/DIAG INJ SC/IM: CPT

## 2020-01-31 PROCEDURE — 99283 EMERGENCY DEPT VISIT LOW MDM: CPT

## 2020-01-31 RX ORDER — ONDANSETRON 4 MG/1
4 TABLET, ORALLY DISINTEGRATING ORAL ONCE
Status: COMPLETED | OUTPATIENT
Start: 2020-01-31 | End: 2020-01-31

## 2020-01-31 RX ORDER — ACETAMINOPHEN 325 MG/1
975 TABLET ORAL ONCE
Status: COMPLETED | OUTPATIENT
Start: 2020-01-31 | End: 2020-01-31

## 2020-01-31 RX ORDER — KETOROLAC TROMETHAMINE 30 MG/ML
15 INJECTION, SOLUTION INTRAMUSCULAR; INTRAVENOUS ONCE
Status: COMPLETED | OUTPATIENT
Start: 2020-01-31 | End: 2020-01-31

## 2020-01-31 RX ADMIN — ACETAMINOPHEN 975 MG: 325 TABLET ORAL at 23:09

## 2020-01-31 RX ADMIN — KETOROLAC TROMETHAMINE 15 MG: 30 INJECTION, SOLUTION INTRAMUSCULAR at 23:10

## 2020-01-31 RX ADMIN — ONDANSETRON 4 MG: 4 TABLET, ORALLY DISINTEGRATING ORAL at 23:09

## 2020-02-01 NOTE — ED NOTES
Dr Lalo Suarez at the bedside for patient evaluation at this time     Meryle Mage, RN  01/31/20 8925

## 2020-02-01 NOTE — ED NOTES
Per Dr Dede Bryan, patient to remain in ED until he is "feeling better"        Gia Perez RN  01/31/20 0549

## 2020-02-01 NOTE — ED ATTENDING ATTESTATION
1/31/2020  IEbenezer MD, saw and evaluated the patient  I have discussed the patient with the resident/non-physician practitioner and agree with the resident's/non-physician practitioner's findings, Plan of Care, and MDM as documented in the resident's/non-physician practitioner's note, except where noted  All available labs and Radiology studies were reviewed  I was present for key portions of any procedure(s) performed by the resident/non-physician practitioner and I was immediately available to provide assistance  At this point I agree with the current assessment done in the Emergency Department  I have conducted an independent evaluation of this patient a history and physical is as follows:  Gradual onset of headache throbbing in nature started gradually around 9:00 a m  Waxing waning throughout the day some relief with Tylenol patient took blood pressure and was elevated so he was concerned so he came the emergency room for evaluation    Patient has no complaints of nausea vomiting no complaints of fever chills no complaints of neck pain or neck stiffness no visual changes and no focal weakness no trauma    No FH of brain tumor or aneurysm   Patient has a history of migraine headaches   Exam patient is in no acute distress HEENT exam pupils equal round react to light extraocular muscles are intact neck is supple no carotid bruits normal carotid upstrokes lungs clear heart regular abdomen soft nontender neurologic exam cranial nerves 2-12 intact motor 5/5 sensory grossly intact cerebellar testing including gait normal  Impression headache  Is will treat with analgesics and re-evaluate  ED Course         Critical Care Time  Procedures

## 2020-02-01 NOTE — ED PROVIDER NOTES
History  Chief Complaint   Patient presents with    High Blood Pressure     pt reports headache all day  states he took his BP at home and it was high at "110/100"     MVP and asthma and migraine    Started this AM with headaches, gradual onset starting 9 AM  Tried tylenol  4 oclock last tylenol  Different location than migraine  Doesn't have to take any suppressive medicines  Sister's  had headache similar character and told him to check BP because he had a BP problem that caused headache once  Checked his BP at home and it was 190/100 something  He seems unsure  No recent illness  No fevers  No meningismus  No nausea vomiting  No vision change, no tinnitus  No focal neuro deficits  No chest pain no SOB  No diaphoresis  Prior to Admission Medications   Prescriptions Last Dose Informant Patient Reported? Taking?   acetaminophen (TYLENOL) 500 mg tablet   No Yes   Sig: Take 1 tablet (500 mg total) by mouth every 6 (six) hours as needed for mild pain or moderate pain   albuterol (VENTOLIN HFA) 90 mcg/act inhaler   Yes Yes   Sig: Inhale 1 puff every 6 (six) hours as needed   baclofen 10 mg tablet   No No   Sig: Take 1 tablet (10 mg total) by mouth 3 (three) times a day for 4 days   citalopram (CeleXA) 20 mg tablet   Yes Yes   Sig: Take 20 mg by mouth daily   fluticasone-vilanterol (BREO ELLIPTA) 200-25 MCG/INH inhaler   Yes Yes   Sig: Inhale 1 puff daily   tiotropium (SPIRIVA RESPIMAT) 1 25 MCG/ACT AERS inhaler   Yes Yes   Sig: Inhale 2 5 mcg daily      Facility-Administered Medications: None       Past Medical History:   Diagnosis Date    Asthma     Mitral valve prolapse        History reviewed  No pertinent surgical history  History reviewed  No pertinent family history  I have reviewed and agree with the history as documented      Social History     Tobacco Use    Smoking status: Never Smoker    Smokeless tobacco: Never Used   Substance Use Topics    Alcohol use: Not Currently Comment: socially    Drug use: No        Review of Systems   Constitutional: Negative for activity change, chills, diaphoresis, fatigue and fever  HENT: Negative for congestion, postnasal drip, rhinorrhea, sneezing, sore throat and trouble swallowing  Eyes: Negative for visual disturbance  Respiratory: Negative for cough, chest tightness, shortness of breath and wheezing  Cardiovascular: Negative for chest pain and leg swelling  Gastrointestinal: Negative for abdominal distention, abdominal pain, blood in stool, constipation, diarrhea, nausea and vomiting  Genitourinary: Negative for decreased urine volume, dysuria, flank pain, frequency, hematuria and urgency  Musculoskeletal: Negative for neck stiffness  Skin: Negative for color change and rash  Neurological: Positive for headaches  Negative for dizziness, tremors, syncope, facial asymmetry, speech difficulty, weakness, light-headedness and numbness  Psychiatric/Behavioral: Negative for confusion and sleep disturbance  All other systems reviewed and are negative  Physical Exam  ED Triage Vitals   Temperature Pulse Respirations Blood Pressure SpO2   01/31/20 2208 01/31/20 2208 01/31/20 2208 01/31/20 2208 01/31/20 2208   98 8 °F (37 1 °C) 82 18 160/99 99 %      Temp Source Heart Rate Source Patient Position - Orthostatic VS BP Location FiO2 (%)   01/31/20 2208 01/31/20 2208 01/31/20 2208 01/31/20 2208 --   Oral Monitor Sitting Right arm       Pain Score       01/31/20 2215       6             Orthostatic Vital Signs  Vitals:    01/31/20 2208 01/31/20 2215 01/31/20 2300 01/31/20 2330   BP: 160/99 137/71 141/76 128/72   Pulse: 82 77 76 70   Patient Position - Orthostatic VS: Sitting Sitting Sitting Lying       Physical Exam   Constitutional: He is oriented to person, place, and time  He appears well-developed and well-nourished  No distress  HENT:   Head: Normocephalic and atraumatic     Nose: Nose normal    Eyes: Pupils are equal, round, and reactive to light  Conjunctivae and EOM are normal  No scleral icterus  Neck: Normal range of motion  Neck supple  No tracheal deviation present  Cardiovascular: Normal rate, regular rhythm, normal heart sounds and intact distal pulses  No murmur heard  Pulmonary/Chest: Effort normal and breath sounds normal  No stridor  No respiratory distress  He has no wheezes  Abdominal: Soft  Bowel sounds are normal  He exhibits no distension  There is no tenderness  There is no guarding  Musculoskeletal: Normal range of motion  He exhibits no edema, tenderness or deformity  Neurological: He is alert and oriented to person, place, and time  He displays normal reflexes  No cranial nerve deficit or sensory deficit  He exhibits normal muscle tone  Coordination normal    Visual fields intact    Coordination intact   Skin: Skin is warm and dry  Capillary refill takes less than 2 seconds  He is not diaphoretic  Psychiatric: He has a normal mood and affect  His behavior is normal  Judgment and thought content normal    Nursing note and vitals reviewed  ED Medications  Medications   acetaminophen (TYLENOL) tablet 975 mg (975 mg Oral Given 1/31/20 2309)   ketorolac (TORADOL) injection 15 mg (15 mg Intramuscular Given 1/31/20 2310)   ondansetron (ZOFRAN-ODT) dispersible tablet 4 mg (4 mg Oral Given 1/31/20 2309)       Diagnostic Studies  Results Reviewed     None                 No orders to display         Procedures  Procedures      ED Course                               MDM  Number of Diagnoses or Management Options  Elevated blood pressure reading:   Headache:   Diagnosis management comments: Patient not exhibiting symptoms of sudden or thunderclap headache  He is nontoxic appearing  He has had no fevers or neck stiffness  Patient declines IV medication for migraine, treated with toradol, tylenol and zofran  Has complete symptom resolution            Disposition  Final diagnoses:   Headache - migraine   Elevated blood pressure reading - at home     Time reflects when diagnosis was documented in both MDM as applicable and the Disposition within this note     Time User Action Codes Description Comment    1/31/2020 11:11 PM Margarette Aas Add [R51] Headache     1/31/2020 11:11 PM Margarette Aas Modify [R51] Headache migraine    1/31/2020 11:11 PM Margarette Aas Add [R03 0] Elevated blood pressure reading     1/31/2020 11:11 PM Margarette Aas Modify [R03 0] Elevated blood pressure reading at home      ED Disposition     ED Disposition Condition Date/Time Comment    Discharge Stable Fri Jan 31, 2020 11:10 PM [facility] discharge to home/self care              Follow-up Information     Follow up With Specialties Details Why Contact Info Additional [address] [facility] Call in 3 days for blood pressure check [address] [address]  [address] [place] [address], [phone]          Discharge Medication List as of 1/31/2020 11:12 PM      CONTINUE these medications which have NOT CHANGED    Details   acetaminophen (TYLENOL) 500 mg tablet Take 1 tablet (500 mg total) by mouth every 6 (six) hours as needed for mild pain or moderate pain, Starting Tue 7/30/2019, Print      albuterol (VENTOLIN HFA) 90 mcg/act inhaler Inhale 1 puff every 6 (six) hours as needed, Starting Mon 3/11/2019, Historical Med      citalopram (CeleXA) 20 mg tablet Take 20 mg by mouth daily, Historical Med      fluticasone-vilanterol (BREO ELLIPTA) 200-25 MCG/INH inhaler Inhale 1 puff daily, Starting Mon 8/20/2018, Historical Med      tiotropium (SPIRIVA RESPIMAT) 1 25 MCG/ACT AERS inhaler Inhale 2 5 mcg daily, Starting Mon 8/20/2018, Historical Med      baclofen 10 mg tablet Take 1 tablet (10 mg total) by mouth 3 (three) times a day for 4 days, Starting Wed 9/25/2019, Until Sun 9/29/2019, Print           No discharge procedures on file  ED Provider  Attending physically available and evaluated [place] [facility] I managed the patient along with the ED Attending      Electronically Signed by         Alonzo Gomez MD  02/01/20 9349

## 2020-02-03 VITALS
WEIGHT: 175 LBS | RESPIRATION RATE: 20 BRPM | HEART RATE: 70 BPM | BODY MASS INDEX: 29.12 KG/M2 | DIASTOLIC BLOOD PRESSURE: 72 MMHG | OXYGEN SATURATION: 98 % | SYSTOLIC BLOOD PRESSURE: 128 MMHG | TEMPERATURE: 98.8 F

## 2020-07-05 ENCOUNTER — HOSPITAL ENCOUNTER (EMERGENCY)
Facility: HOSPITAL | Age: 40
Discharge: HOME | End: 2020-07-05
Attending: EMERGENCY MEDICINE
Payer: MEDICARE

## 2020-07-05 VITALS
RESPIRATION RATE: 18 BRPM | OXYGEN SATURATION: 98 % | DIASTOLIC BLOOD PRESSURE: 90 MMHG | HEART RATE: 91 BPM | TEMPERATURE: 97.8 F | WEIGHT: 179 LBS | SYSTOLIC BLOOD PRESSURE: 159 MMHG | BODY MASS INDEX: 29.79 KG/M2

## 2020-07-05 DIAGNOSIS — M54.50 LOW BACK PAIN: Primary | ICD-10-CM

## 2020-07-05 PROCEDURE — 99283 EMERGENCY DEPT VISIT LOW MDM: CPT

## 2020-07-05 PROCEDURE — 99284 EMERGENCY DEPT VISIT MOD MDM: CPT | Performed by: EMERGENCY MEDICINE

## 2020-07-05 RX ORDER — ACETAMINOPHEN 325 MG/1
650 TABLET ORAL ONCE
Status: COMPLETED | OUTPATIENT
Start: 2020-07-05 | End: 2020-07-05

## 2020-07-05 RX ORDER — LIDOCAINE 50 MG/G
1 PATCH TOPICAL DAILY
Status: DISCONTINUED | OUTPATIENT
Start: 2020-07-05 | End: 2020-07-05 | Stop reason: HOSPADM

## 2020-07-05 RX ADMIN — ACETAMINOPHEN 650 MG: 325 TABLET ORAL at 18:10

## 2020-07-05 RX ADMIN — LIDOCAINE 1 PATCH: 50 PATCH TOPICAL at 18:09

## 2020-07-05 NOTE — DISCHARGE INSTRUCTIONS
Please call your PCP to make an appointment to follow up for your back pain  Please also call the [facility] to make an appointment for your back pain  You can take tylenol 650 mg four times per day for pain relief  You can also go to the pharmacy and buy lidocaine 4% patch (salonpas) for pain relief

## 2020-07-05 NOTE — ED PROVIDER NOTES
History  Chief Complaint   Patient presents with    Back Pain     ptreports back pain X1 month  states hes been seen here 3 times for same pain     HPI     40 yo M with past medical history of asthma and mitral valve prolapse who presents for evaluation of lower back pain  Patient is Bulgarian speaking only and history was obtained using  phone  The pain started about one month ago, no precipitating events  Denies injuries or heavy lifting precipitating the onset of pain  Patient states the pain is intermittent  He states is it worse in the left paraspinal region and describes it as stabbing  Sitting and standing make it worse, lying down makes it better  Tried tylenol with minimal relief of pain  Has not seen anyone else for evaluation of this back pain  Denies any fevers, chills, nausea, vomiting, hematuria  Denies any radiation of the pain down his legs  Denies numbness, tingling or weakness in his lower extremities  Denies any bladder or bowel incontinence, or saddle anesthesia  Denies any history of cancer  No recent injuries  Denies IVDU  Prior to Admission Medications   Prescriptions Last Dose Informant Patient Reported?  Taking?   acetaminophen (TYLENOL) 500 mg tablet   No No   Sig: Take 1 tablet (500 mg total) by mouth every 6 (six) hours as needed for mild pain or moderate pain   albuterol (VENTOLIN HFA) 90 mcg/act inhaler   Yes No   Sig: Inhale 1 puff every 6 (six) hours as needed   baclofen 10 mg tablet   No No   Sig: Take 1 tablet (10 mg total) by mouth 3 (three) times a day for 4 days   citalopram (CeleXA) 20 mg tablet   Yes No   Sig: Take 20 mg by mouth daily   fluticasone-vilanterol (BREO ELLIPTA) 200-25 MCG/INH inhaler   Yes No   Sig: Inhale 1 puff daily   tiotropium (SPIRIVA RESPIMAT) 1 25 MCG/ACT AERS inhaler   Yes No   Sig: Inhale 2 5 mcg daily      Facility-Administered Medications: None       Past Medical History:   Diagnosis Date    Asthma     Mitral valve prolapse        History reviewed  No pertinent surgical history  History reviewed  No pertinent family history  I have reviewed and agree with the history as documented  E-Cigarette/Vaping     E-Cigarette/Vaping Substances     Social History     Tobacco Use    Smoking status: Never Smoker    Smokeless tobacco: Never Used   Substance Use Topics    Alcohol use: Not Currently     Comment: socially    Drug use: No        Review of Systems   Constitutional: Negative for chills, fatigue, fever and unexpected weight change  HENT: Negative for congestion, rhinorrhea and sore throat  Respiratory: Negative for cough, chest tightness, shortness of breath and wheezing  Cardiovascular: Negative for chest pain and leg swelling  Gastrointestinal: Negative for abdominal pain, diarrhea, nausea and vomiting  Genitourinary: Negative for difficulty urinating, dysuria, frequency, hematuria and urgency  Musculoskeletal: Positive for back pain  Negative for arthralgias, gait problem and myalgias  Skin: Negative for color change and rash  Neurological: Negative for dizziness, weakness, light-headedness, numbness and headaches  All other systems reviewed and are negative  Physical Exam  ED Triage Vitals [07/05/20 1717]   Temperature Pulse Respirations Blood Pressure SpO2   97 8 °F (36 6 °C) 91 18 159/90 98 %      Temp Source Heart Rate Source Patient Position - Orthostatic VS BP Location FiO2 (%)   Oral Monitor Sitting Right arm --      Pain Score       7             Orthostatic Vital Signs  Vitals:    07/05/20 1717   BP: 159/90   Pulse: 91   Patient Position - Orthostatic VS: Sitting       Physical Exam   Constitutional: He is oriented to person, place, and time  He appears well-developed and well-nourished  No distress  HENT:   Head: Normocephalic and atraumatic  Eyes: Pupils are equal, round, and reactive to light  Conjunctivae and EOM are normal    Neck: Neck supple     Cardiovascular: Normal rate, regular rhythm, normal heart sounds and intact distal pulses  Exam reveals no gallop and no friction rub  No murmur heard  Pulmonary/Chest: Effort normal and breath sounds normal  No respiratory distress  He has no wheezes  He has no rales  Abdominal: Soft  Bowel sounds are normal  He exhibits no distension  There is no tenderness  There is no rebound and no guarding  Musculoskeletal: He exhibits tenderness  He exhibits no edema  Tenderness over left paraspinal muscles  No midline spinal tenderness  Neurological: He is alert and oriented to person, place, and time  He has normal strength and normal reflexes  No cranial nerve deficit or sensory deficit  Gait normal    Skin: Skin is warm and dry  He is not diaphoretic  Psychiatric: He has a normal mood and affect  His behavior is normal        ED Medications  Medications   acetaminophen (TYLENOL) tablet 650 mg (650 mg Oral Given 7/5/20 1810)       Diagnostic Studies  Results Reviewed     None                 No orders to display         Procedures  Procedures      ED Course       US AUDIT      Most Recent Value   Initial Alcohol Screen: US AUDIT-C    1  How often do you have a drink containing alcohol?  0 Filed at: 07/05/2020 1717   2  How many drinks containing alcohol do you have on a typical day you are drinking? 0 Filed at: 07/05/2020 1717   3a  Male UNDER 65: How often do you have five or more drinks on one occasion? 0 Filed at: 07/05/2020 1717   Audit-C Score  0 Filed at: 07/05/2020 1717                  NELSON/DAST-10      Most Recent Value   How many times in the past year have you    Used an illegal drug or used a prescription medication for non-medical reasons? Never Filed at: 07/05/2020 1717                              MDM    40 yo male with past medical history of asthma and mitral valve prolapse who presents for evaluation of non-traumatic lower back pain for the past month   Tenderness in paraspinal region, no symptoms of cauda equina, no midline tenderness, normal lower extremity neurologic exam  Differential diagnosis includes: musculoskeletal back pain, lumbar strain  Diagnoses considered including cauda equina, epidural abscess, fracture unlikely based on history and exam findings  Plan to try lidoderm patch and tylenol for pain relief  No need for imaging at this time  Patient had slight improvement of pain with lidoderm patch and tylenol  Patient agreeable for discharge  Patient instructed to follow up with PCP and to [facility]  Instructed to take tylenol 650 mg up to 4 times per day at home for pain, also instructed that he can get OTC lidocaine patch  Patient given return precautions including weakness in his legs, inability to walk, saddle anesthesia, bladder or bowel incontinence  Patient expressed understanding  All instructions were communicated to the patient using the  phone  Disposition  Final diagnoses:   Low back pain     Time reflects when diagnosis was documented in both MDM as applicable and the Disposition within this note     Time User Action Codes Description Comment    7/5/2020  6:12 PM Pablo Sewet Add [M54 5] Low back pain       ED Disposition     ED Disposition Condition Date/Time Comment    Discharge Stable Sun Jul 5, 2020  6:40 PM [facility] discharge to home/self care              Follow-up Information     Follow up With Specialties Details Why Contact Info Additional Information    [facility] Schedule an appointment as soon as possible for a visit   [phone]          Discharge Medication List as of 7/5/2020  6:41 PM      CONTINUE these medications which have NOT CHANGED    Details   acetaminophen (TYLENOL) 500 mg tablet Take 1 tablet (500 mg total) by mouth every 6 (six) hours as needed for mild pain or moderate pain, Starting Tue 7/30/2019, Print      albuterol (VENTOLIN HFA) 90 mcg/act inhaler Inhale 1 puff every 6 (six) hours as needed, Starting Mon 3/11/2019, Historical Med      baclofen 10 mg tablet Take 1 tablet (10 mg total) by mouth 3 (three) times a day for 4 days, Starting Wed 9/25/2019, Until Sun 9/29/2019, Print      citalopram (CeleXA) 20 mg tablet Take 20 mg by mouth daily, Historical Med      fluticasone-vilanterol (BREO ELLIPTA) 200-25 MCG/INH inhaler Inhale 1 puff daily, Starting Mon 8/20/2018, Historical Med      tiotropium (SPIRIVA RESPIMAT) 1 25 MCG/ACT AERS inhaler Inhale 2 5 mcg daily, Starting Mon 8/20/2018, Historical Med           No discharge procedures on file  PDMP Review     None           ED Provider  Attending physically available and evaluated [facility]  I managed the patient along with the ED Attending      Electronically Signed by         Herman Cushing, MD  07/06/20 6852

## 2020-07-05 NOTE — ED ATTENDING ATTESTATION
7/5/2020  Dora LIZAMA DO, saw and evaluated the patient  I have discussed the patient with the resident/non-physician practitioner and agree with the resident's/non-physician practitioner's findings, Plan of Care, and MDM as documented in the resident's/non-physician practitioner's note, except where noted  All available labs and Radiology studies were reviewed  I was present for key portions of any procedure(s) performed by the resident/non-physician practitioner and I was immediately available to provide assistance  At this point I agree with the current assessment done in the Emergency Department  I have conducted an independent evaluation of this patient a history and physical is as follows:    Patient is a 49-year-old male, Uzbek-speaking, interviewed with a  service  For the last 1 month he has had some mild left-sided low back pain, worse with twisting or bending or moving  No trauma, no fever, no chills, no known injury  No bladder or bowel incontinence, no saddle anesthesia, no leg weakness, no IV drug use  He is taking a 1000 mg of Tylenol twice a day which gives some some mild but not complete relief  He was supposed to sees primary care physician however missed that appointment, it was rescheduled for August 10th  Despite nurse's notes he has not been here 3 times for this pain recently, he did have 2 visits in September of 2019 but none since then       Constitutional:  No recent weight  gains or losses   Eyes:  No visual changes   ENT:  No tinnitus or hearing changes   Cardiac: No chest pain or palpitations   Respiratory:  No cough or shortness of breath   Abdominal:  No nausea or vomiting   Urinary: As per HPI   Hematologic: No easy bruising or bleeding   Skin: No rash   Musculoskeletal: As per HPI   Neurologic: As per HPI   Psychiatric: No mood changes      General:  Patient is well-appearing  Head:  Atraumatic  Eyes:  Conjunctiva pink  ENT:  Mucous membranes are moist  Neck:  Supple  Cardiac:  S1-S2, without murmurs  Lungs:  Clear to auscultation bilaterally  Abdomen:  Soft, nontender, normal bowel sounds, no CVA tenderness  Extremities:  Normal range of motion No warmth, redness or tenderness to the back  There is no CVA tenderness, no spinal tenderness, no warmth or fluctuance  Neurologic:  Awake, fluent speech, normal comprehension, strength is 5/5 of the bilateral hips, knees, ankles, reflexes are 2/4 at the bilateral knees and ankles  Can dorsiflex & plantarflex great toes bilaterally without difficulty, no saddle anesthesia, negative straight leg raise bilaterally  AAOx3  Skin:  Pink warm and dry, no rash  Psychiatric:  Alert, pleasant, cooperative        ED Course     Patient given Tylenol, Lidoderm patch  Based on the patient's history and physical exam findings, I do not find any evidence of neurosurgical emergency or need for emergent imaging studies as there is no bladder or bowel incontinence, no saddle anesthesia, and no significant neurologic abnormalities  There is no evidence of cauda equina syndrome, The patient is afebrile, has no significant vertebral tenderness or fluctuance, and has no risk factors for spinal abscess such as IV drug use, significant trauma, or recent spinal injections  I believe it is appropriate for the patient to be discharged and managed conservatively as an outpatient, returning as necessary if there is the development of any concerning signs or symptoms        Critical Care Time  Procedures

## 2020-08-11 ENCOUNTER — APPOINTMENT (EMERGENCY)
Dept: RADIOLOGY | Facility: HOSPITAL | Age: 40
End: 2020-08-11
Payer: MEDICARE

## 2020-08-11 ENCOUNTER — HOSPITAL ENCOUNTER (EMERGENCY)
Facility: HOSPITAL | Age: 40
Discharge: HOME | End: 2020-08-11
Attending: EMERGENCY MEDICINE
Payer: MEDICARE

## 2020-08-11 VITALS
TEMPERATURE: 98.4 F | HEART RATE: 72 BPM | OXYGEN SATURATION: 99 % | SYSTOLIC BLOOD PRESSURE: 160 MMHG | RESPIRATION RATE: 18 BRPM | DIASTOLIC BLOOD PRESSURE: 74 MMHG

## 2020-08-11 DIAGNOSIS — S92.501A CLOSED FRACTURE OF PHALANX OF RIGHT FIFTH TOE, INITIAL ENCOUNTER: Primary | ICD-10-CM

## 2020-08-11 PROCEDURE — 99283 EMERGENCY DEPT VISIT LOW MDM: CPT

## 2020-08-11 PROCEDURE — 73630 X-RAY EXAM OF FOOT: CPT

## 2020-08-11 PROCEDURE — 99284 EMERGENCY DEPT VISIT MOD MDM: CPT | Performed by: EMERGENCY MEDICINE

## 2020-08-11 RX ORDER — IBUPROFEN 600 MG/1
600 TABLET ORAL ONCE
Status: DISCONTINUED | OUTPATIENT
Start: 2020-08-11 | End: 2020-08-11 | Stop reason: HOSPADM

## 2020-08-11 RX ORDER — ACETAMINOPHEN 325 MG/1
650 TABLET ORAL ONCE
Status: COMPLETED | OUTPATIENT
Start: 2020-08-11 | End: 2020-08-11

## 2020-08-11 RX ADMIN — ACETAMINOPHEN 650 MG: 325 TABLET, FILM COATED ORAL at 17:26

## 2020-08-11 NOTE — ED PROVIDER NOTES
History  Chief Complaint   Patient presents with    Toe Injury     pt fell downt he stairs on sunday, right toes bruising and pain, took tylenol for pain        Toe Pain   Location:  R toes  Severity:  Moderate  Onset quality:  Sudden  Duration:  3 days  Timing:  Constant  Progression:  Unchanged  Chronicity:  New  Context:  Tripped forward onto toes   Associated symptoms: no abdominal pain, no chest pain, no congestion, no cough, no diarrhea, no fever, no headaches, no nausea, no rash, no shortness of breath, no sore throat and no vomiting        Prior to Admission Medications   Prescriptions Last Dose Informant Patient Reported? Taking?   acetaminophen (TYLENOL) 500 mg tablet   No No   Sig: Take 1 tablet (500 mg total) by mouth every 6 (six) hours as needed for mild pain or moderate pain   albuterol (VENTOLIN HFA) 90 mcg/act inhaler   Yes Yes   Sig: Inhale 1 puff every 6 (six) hours as needed   baclofen 10 mg tablet   No No   Sig: Take 1 tablet (10 mg total) by mouth 3 (three) times a day for 4 days   citalopram (CeleXA) 20 mg tablet   Yes Yes   Sig: Take 20 mg by mouth daily   fluticasone-vilanterol (BREO ELLIPTA) 200-25 MCG/INH inhaler   Yes Yes   Sig: Inhale 1 puff daily   tiotropium (SPIRIVA RESPIMAT) 1 25 MCG/ACT AERS inhaler   Yes No   Sig: Inhale 2 5 mcg daily      Facility-Administered Medications: None       Past Medical History:   Diagnosis Date    Asthma     Mitral valve prolapse        History reviewed  No pertinent surgical history  History reviewed  No pertinent family history  I have reviewed and agree with the history as documented  E-Cigarette/Vaping    E-Cigarette Use Never User      E-Cigarette/Vaping Substances     Social History     Tobacco Use    Smoking status: Never Smoker    Smokeless tobacco: Never Used   Substance Use Topics    Alcohol use: Not Currently     Comment: socially    Drug use: No        Review of Systems   Constitutional: Negative for chills and fever     HENT: Negative for congestion and sore throat  Eyes: Negative for photophobia and visual disturbance  Respiratory: Negative for cough and shortness of breath  Cardiovascular: Negative for chest pain and leg swelling  Gastrointestinal: Negative for abdominal pain, blood in stool, diarrhea, nausea and vomiting  Genitourinary: Negative for dysuria and hematuria  Musculoskeletal: Negative for back pain, neck pain and neck stiffness  Skin: Negative for rash and wound  Neurological: Negative for syncope, weakness, numbness and headaches  Psychiatric/Behavioral: Negative for behavioral problems and confusion  All other systems reviewed and are negative  Physical Exam  ED Triage Vitals   Temperature Pulse Respirations Blood Pressure SpO2   08/11/20 1644 08/11/20 1642 08/11/20 1642 08/11/20 1642 08/11/20 1642   98 4 °F (36 9 °C) 72 18 160/74 99 %      Temp Source Heart Rate Source Patient Position - Orthostatic VS BP Location FiO2 (%)   08/11/20 1644 -- -- -- --   Oral          Pain Score       08/11/20 1642       5             Orthostatic Vital Signs  Vitals:    08/11/20 1642   BP: 160/74   Pulse: 72       Physical Exam  Vitals signs and nursing note reviewed  Constitutional:       General: He is not in acute distress  Appearance: He is well-developed  He is not diaphoretic  HENT:      Head: Normocephalic  Right Ear: External ear normal       Left Ear: External ear normal       Nose: Nose normal    Eyes:      General:         Right eye: No discharge  Left eye: No discharge  Conjunctiva/sclera: Conjunctivae normal    Neck:      Musculoskeletal: Neck supple  Trachea: No tracheal deviation  Cardiovascular:      Rate and Rhythm: Normal rate  Heart sounds: Normal heart sounds  Pulmonary:      Effort: Pulmonary effort is normal  No respiratory distress  Breath sounds: Normal breath sounds  No stridor  No wheezing or rales     Abdominal:      General: Bowel sounds are normal  There is no distension  Palpations: Abdomen is soft  Tenderness: There is no abdominal tenderness  There is no guarding or rebound  Musculoskeletal:      Comments: RLE: ttp over 3-5th toes with moderate bruising, no effusion or deformity, intact distal sensation in all toes with intact motor strength, mild pain with ROM of 3-5th toes  No ttp over tarsals/metatarsals  No ttp over ankle and full ROM at ankle, 2+ DP pulse    LLE: NTTP w/ FROM throughout   Skin:     General: Skin is warm and dry  Capillary Refill: Capillary refill takes less than 2 seconds  Findings: No rash  Neurological:      Mental Status: He is alert  Cranial Nerves: No cranial nerve deficit  Sensory: No sensory deficit  Motor: No abnormal muscle tone  Psychiatric:         Behavior: Behavior normal          ED Medications  Medications   acetaminophen (TYLENOL) tablet 650 mg (650 mg Oral Given 8/11/20 1726)       Diagnostic Studies  Results Reviewed     None                 XR foot 3+ views RIGHT   Final Result by Kaylan Orellana MD (08/11 1919)      6 mm intra-articular fracture of the base of the fifth proximal phalanx involving approximately 40% of the joint space  Workstation performed: [id number]               Procedures  Procedures      ED Course                                           MDM  Number of Diagnoses or Management Options  Closed fracture of phalanx of right fifth toe, initial encounter:   Diagnosis management comments: This is a 47-year-old male who presents with chief complaint pain in his right toes, patient reports that 3 days ago he tripped forward and hyper flexed his toes, since then he reports that he has had pain in his 3rd through 5th toes with some bruising, he denies pain more proximally in the foot or ankle, denies other injuries  Denies other medical problems    On exam patient has some bruising over his right 3rd through 5th phalanges with some associated tenderness palpation, no appreciated deformities, he has no tenderness palpation of the metatarsals or tarsals or at the ankle and otherwise full range of motion and is neurovascularly intact in the right lower extremity  Overall suspicion for fracture versus bruising of the phalanges, will obtain an x-ray of the right foot to better evaluate  Will give Tylenol for pain  R 5th phalanx fracture on x-ray wet read  RLE Splint for R 5th phalanx fx was placed by ED nurse  Examined by me post splint placement  Splint is in good position and neurovascular exam intact after splint placement  Disposition  Final diagnoses:   Closed fracture of phalanx of right fifth toe, initial encounter     Time reflects when diagnosis was documented in both MDM as applicable and the Disposition within this note     Time User Action Codes Description Comment    8/11/2020  5:47 PM Izaiah Russell Add [S92 501A] Closed fracture of phalanx of right fifth toe, initial encounter       ED Disposition     ED Disposition Condition Date/Time Comment    Discharge Stable Tu Aug 11, 2020  5:47 PM [facility] discharge to home/self care              Follow-up Information     Follow up With Specialties Details Why Contact Info Additional Information    [facility] Schedule an appointment as soon as possible for a visit   [address] [address]  [address] [facility], [address] [place] [address]          Discharge Medication List as of 8/11/2020  5:47 PM      CONTINUE these medications which have NOT CHANGED    Details   albuterol (VENTOLIN HFA) 90 mcg/act inhaler Inhale 1 puff every 6 (six) hours as needed, Starting Mon 3/11/2019, Historical Med      citalopram (CeleXA) 20 mg tablet Take 20 mg by mouth daily, Historical Med      fluticasone-vilanterol (BREO ELLIPTA) 200-25 MCG/INH inhaler Inhale 1 puff daily, Starting Mon 8/20/2018, Historical Med      acetaminophen (TYLENOL) 500 mg tablet Take 1 tablet (500 mg total) by mouth every 6 (six) hours as needed for mild pain or moderate pain, Starting Tue 7/30/2019, Print      baclofen 10 mg tablet Take 1 tablet (10 mg total) by mouth 3 (three) times a day for 4 days, Starting Wed 9/25/2019, Until Sun 9/29/2019, Print      tiotropium (SPIRIVA RESPIMAT) 1 25 MCG/ACT AERS inhaler Inhale 2 5 mcg daily, Starting Mon 8/20/2018, Historical Med           No discharge procedures on file  PDMP Review     None           ED Provider  Attending physically available and evaluated [facility]  I managed the patient along with the ED Attending      Electronically Signed by         Kasie Alfred MD  08/11/20 8938

## 2020-08-11 NOTE — ED ATTENDING ATTESTATION
8/11/2020  I, Ketty Meyer MD, saw and evaluated the patient  I have discussed the patient with the resident/non-physician practitioner and agree with the resident's/non-physician practitioner's findings, Plan of Care, and MDM as documented in the resident's/non-physician practitioner's note, except where noted  All available labs and Radiology studies were reviewed  I was present for key portions of any procedure(s) performed by the resident/non-physician practitioner and I was immediately available to provide assistance  At this point I agree with the current assessment done in the Emergency Department  I have conducted an independent evaluation of this patient a history and physical is as follows:    ED Course         Critical Care Time  Procedures    40 yo male c/o right foot pain after tripping forward on Sunday  Pt able to ambulate  Pt with bruising over toes  Vss, afebrile, lungs cta, rrr, right foot tenderness, eccymosis 3, 4,5 digits, nvi  Xray

## 2020-09-30 ENCOUNTER — OFFICE VISIT (OUTPATIENT)
Dept: PODIATRY | Facility: CLINIC | Age: 40
End: 2020-09-30
Payer: MEDICARE

## 2020-09-30 VITALS
HEIGHT: 65 IN | DIASTOLIC BLOOD PRESSURE: 77 MMHG | WEIGHT: 180 LBS | SYSTOLIC BLOOD PRESSURE: 141 MMHG | HEART RATE: 73 BPM | BODY MASS INDEX: 29.99 KG/M2 | TEMPERATURE: 98.2 F

## 2020-09-30 DIAGNOSIS — S92.511A CLOSED DISPLACED FRACTURE OF PROXIMAL PHALANX OF LESSER TOE OF RIGHT FOOT, INITIAL ENCOUNTER: Primary | ICD-10-CM

## 2020-09-30 PROCEDURE — 99202 OFFICE O/P NEW SF 15 MIN: CPT | Performed by: PODIATRIST

## 2020-09-30 RX ORDER — NAPROXEN 500 MG/1
500 TABLET ORAL 2 TIMES DAILY WITH MEALS
Qty: 30 TABLET | Refills: 0 | Status: SHIPPED | OUTPATIENT
Start: 2020-09-30

## 2021-04-17 ENCOUNTER — HOSPITAL ENCOUNTER (EMERGENCY)
Facility: HOSPITAL | Age: 41
Discharge: HOME | End: 2021-04-17
Admitting: EMERGENCY MEDICINE
Payer: MEDICARE

## 2021-04-17 VITALS
OXYGEN SATURATION: 98 % | DIASTOLIC BLOOD PRESSURE: 79 MMHG | RESPIRATION RATE: 18 BRPM | TEMPERATURE: 100.8 F | SYSTOLIC BLOOD PRESSURE: 155 MMHG | WEIGHT: 171 LBS | BODY MASS INDEX: 28.46 KG/M2 | HEART RATE: 94 BPM

## 2021-04-17 DIAGNOSIS — U07.1 COVID-19: Primary | ICD-10-CM

## 2021-04-17 LAB
FLUAV RNA RESP QL NAA+PROBE: NEGATIVE
FLUBV RNA RESP QL NAA+PROBE: NEGATIVE
RSV RNA RESP QL NAA+PROBE: NEGATIVE
SARS-COV-2 RNA RESP QL NAA+PROBE: POSITIVE

## 2021-04-17 PROCEDURE — 99284 EMERGENCY DEPT VISIT MOD MDM: CPT | Performed by: PHYSICIAN ASSISTANT

## 2021-04-17 PROCEDURE — 0241U HB NFCT DS VIR RESP RNA 4 TRGT: CPT | Performed by: PHYSICIAN ASSISTANT

## 2021-04-17 PROCEDURE — 99283 EMERGENCY DEPT VISIT LOW MDM: CPT

## 2021-04-17 RX ORDER — ALBUTEROL SULFATE 90 UG/1
2 AEROSOL, METERED RESPIRATORY (INHALATION) EVERY 4 HOURS PRN
Qty: 1 INHALER | Refills: 0 | Status: SHIPPED | OUTPATIENT
Start: 2021-04-17

## 2021-04-17 RX ORDER — PREDNISONE 20 MG/1
40 TABLET ORAL DAILY
Qty: 10 TABLET | Refills: 0 | Status: SHIPPED | OUTPATIENT
Start: 2021-04-17 | End: 2021-04-22

## 2021-04-17 NOTE — DISCHARGE INSTRUCTIONS
Take motrin/tylenol for pain/fever as needed  Lots of fluids and rest  Return to ED if having chest pain/sob (O2 less than 90 on pulse ox), or anything else concerning  If asthma starts to act up, please take prednisone prescribed along with albuterol inhaler

## 2021-04-17 NOTE — ED PROVIDER NOTES
History  Chief Complaint   Patient presents with    Cough     pt reports cough/fever X1 day  Ariadne Garcia is a 44yo with PMH of mitral valve prolapse and asthma who presents to the ED today with 1 day of dry cough and fevers  101 6F at home today, took tylenol 1 hr ago  Wife is sick with a cough, and mother here with him today with body aches and cough  Patient denies any exposure to Covid-19  No recent travel  Denies sob, wheezing, chest pain, abd pain, n/v/d, and rash  Has chronic neck pain secondary to arthritis, states no new headache/neck pain  Non-smoker  No other acute complaints  Prior to Admission Medications   Prescriptions Last Dose Informant Patient Reported? Taking?   acetaminophen (TYLENOL) 500 mg tablet   No No   Sig: Take 1 tablet (500 mg total) by mouth every 6 (six) hours as needed for mild pain or moderate pain   albuterol (VENTOLIN HFA) 90 mcg/act inhaler   Yes No   Sig: Inhale 1 puff every 6 (six) hours as needed   baclofen 10 mg tablet   No No   Sig: Take 1 tablet (10 mg total) by mouth 3 (three) times a day for 4 days   citalopram (CeleXA) 20 mg tablet   Yes No   Sig: Take 20 mg by mouth daily   fluticasone-vilanterol (BREO ELLIPTA) 200-25 MCG/INH inhaler   Yes No   Sig: Inhale 1 puff daily   naproxen (NAPROSYN) 500 mg tablet   No No   Sig: Take 1 tablet (500 mg total) by mouth 2 (two) times a day with meals   tiotropium (SPIRIVA RESPIMAT) 1 25 MCG/ACT AERS inhaler   Yes No   Sig: Inhale 2 5 mcg daily      Facility-Administered Medications: None       Past Medical History:   Diagnosis Date    Asthma     Mitral valve prolapse        History reviewed  No pertinent surgical history  History reviewed  No pertinent family history  I have reviewed and agree with the history as documented      E-Cigarette/Vaping    E-Cigarette Use Never User      E-Cigarette/Vaping Substances    Nicotine No     THC No     CBD No     Flavoring No     Other No     Unknown No      Social History Tobacco Use    Smoking status: Never Smoker    Smokeless tobacco: Never Used   Substance Use Topics    Alcohol use: Not Currently     Comment: socially    Drug use: No       Review of Systems   Constitutional: Positive for fatigue and fever  HENT: Positive for congestion and sore throat  Negative for rhinorrhea  Respiratory: Positive for cough  Negative for chest tightness, shortness of breath and wheezing  Cardiovascular: Negative for chest pain  Gastrointestinal: Negative for abdominal pain, nausea and vomiting  Genitourinary: Negative for dysuria and frequency  Musculoskeletal:        +chronic neck pain secondary to arthritis per patient   Skin: Negative for rash  Allergic/Immunologic: Negative for immunocompromised state  Neurological: Negative for dizziness, syncope, weakness and headaches  All other systems reviewed and are negative  Physical Exam  Physical Exam  Vitals signs and nursing note reviewed  Constitutional:       General: He is not in acute distress  Appearance: He is well-developed and normal weight  He is not diaphoretic  HENT:      Head: Normocephalic and atraumatic  Eyes:      Conjunctiva/sclera: Conjunctivae normal       Pupils: Pupils are equal, round, and reactive to light  Neck:      Musculoskeletal: Normal range of motion and neck supple  Cardiovascular:      Rate and Rhythm: Normal rate and regular rhythm  Heart sounds: Normal heart sounds  Pulmonary:      Effort: Pulmonary effort is normal  No respiratory distress  Breath sounds: Normal breath sounds  No wheezing  Abdominal:      General: Bowel sounds are normal       Palpations: Abdomen is soft  Tenderness: There is no abdominal tenderness  Skin:     General: Skin is warm and dry  Neurological:      General: No focal deficit present  Mental Status: He is alert and oriented to person, place, and time  Mental status is at baseline     Psychiatric:         Mood and Affect: Mood normal          Behavior: Behavior normal          Vital Signs  ED Triage Vitals   Temperature Pulse Respirations Blood Pressure SpO2   04/17/21 0735 04/17/21 0736 04/17/21 0736 04/17/21 0736 04/17/21 0736   (!) 100 8 °F (38 2 °C) 94 18 155/79 98 %      Temp src Heart Rate Source Patient Position - Orthostatic VS BP Location FiO2 (%)   -- -- 04/17/21 0736 -- --     Sitting        Pain Score       --                  Vitals:    04/17/21 0736   BP: 155/79   Pulse: 94   Patient Position - Orthostatic VS: Sitting         Visual Acuity      ED Medications  Medications - No data to display    Diagnostic Studies  Results Reviewed     Procedure Component Value Units Date/Time    COVID19, Influenza A/B, RSV PCR, [facility] [[id number]]  (Abnormal) Collected: 04/17/21 0759    Lab Status: Final result Specimen: Nares from Nasopharyngeal Swab Updated: 04/17/21 0900     SARS-CoV-2 Positive     INFLUENZA A PCR Negative     INFLUENZA B PCR Negative     RSV PCR Negative    Narrative: This test has been authorized by FDA under an EUA (Emergency Use Assay) for use by authorized laboratories  Clinical caution and judgement should be used with the interpretation of these results with consideration of the clinical impression and other laboratory testing  Testing reported as "Positive" or "Negative" has been proven to be accurate according to standard laboratory validation requirements  All testing is performed with control materials showing appropriate reactivity at standard intervals                   No orders to display              Procedures  Procedures         ED Course                                           MDM    Disposition  Final diagnoses:   COVID-19     Time reflects when diagnosis was documented in both MDM as applicable and the Disposition within this note     Time User Action Codes Description Comment    4/17/2021  9:06 AM Samantha Kim, [address] [facility] [U07 1] COVID-19       ED Disposition     ED Disposition Condition Date/Time Comment    Discharge Stable Sat Apr 17, 2021  9:06 AM [facility] discharge to home/self care  Follow-up Information     Follow up With Specialties Details Why Contact Info Additional [address] [facility] Emergency Medicine  If symptoms worsen [address]  [address] [facility] Emergency Department, [address] , [place], [place], [address]   [phone]          Patient's Medications   Discharge Prescriptions    ALBUTEROL (PROVENTIL HFA,VENTOLIN HFA) 90 MCG/ACT INHALER    Inhale 2 puffs every 4 (four) hours as needed for wheezing       Start Date: 4/17/2021 End Date: --       Order Dose: 2 puffs       Quantity: 1 Inhaler    Refills: 0    PREDNISONE 20 MG TABLET    Take 2 tablets (40 mg total) by mouth daily for 5 days       Start Date: 4/17/2021 End Date: 4/22/2021       Order Dose: 40 mg       Quantity: 10 tablet    Refills: 0     No discharge procedures on file      PDMP Review     None          ED Provider  Electronically Signed by           Marlan Essex, PA-C  04/17/21 8630

## 2021-10-10 ENCOUNTER — HOSPITAL ENCOUNTER (EMERGENCY)
Facility: HOSPITAL | Age: 41
Discharge: HOME | End: 2021-10-10
Attending: EMERGENCY MEDICINE
Payer: MEDICARE

## 2021-10-10 VITALS
RESPIRATION RATE: 16 BRPM | TEMPERATURE: 98 F | HEART RATE: 80 BPM | BODY MASS INDEX: 28.29 KG/M2 | DIASTOLIC BLOOD PRESSURE: 83 MMHG | SYSTOLIC BLOOD PRESSURE: 154 MMHG | OXYGEN SATURATION: 99 % | WEIGHT: 170 LBS

## 2021-10-10 DIAGNOSIS — L25.9 CONTACT DERMATITIS: Primary | ICD-10-CM

## 2021-10-10 PROCEDURE — 99284 EMERGENCY DEPT VISIT MOD MDM: CPT | Performed by: EMERGENCY MEDICINE

## 2021-10-10 PROCEDURE — 99282 EMERGENCY DEPT VISIT SF MDM: CPT

## 2021-10-10 RX ORDER — TRIAMCINOLONE ACETONIDE 1 MG/G
CREAM TOPICAL 2 TIMES DAILY
Qty: 30 G | Refills: 0 | Status: SHIPPED | OUTPATIENT
Start: 2021-10-10 | End: 2021-10-17

## 2021-10-10 RX ORDER — TRIAMCINOLONE ACETONIDE 0.25 MG/G
CREAM TOPICAL ONCE
Status: DISCONTINUED | OUTPATIENT
Start: 2021-10-10 | End: 2021-10-10 | Stop reason: HOSPADM

## 2022-03-02 ENCOUNTER — HOSPITAL ENCOUNTER (EMERGENCY)
Facility: HOSPITAL | Age: 42
Discharge: HOME | End: 2022-03-02
Attending: EMERGENCY MEDICINE | Admitting: EMERGENCY MEDICINE
Payer: MEDICARE

## 2022-03-02 ENCOUNTER — APPOINTMENT (EMERGENCY)
Dept: RADIOLOGY | Facility: HOSPITAL | Age: 42
End: 2022-03-02
Payer: MEDICARE

## 2022-03-02 VITALS
HEART RATE: 72 BPM | TEMPERATURE: 98.4 F | RESPIRATION RATE: 21 BRPM | DIASTOLIC BLOOD PRESSURE: 75 MMHG | SYSTOLIC BLOOD PRESSURE: 141 MMHG | OXYGEN SATURATION: 100 %

## 2022-03-02 DIAGNOSIS — R51.9 HEADACHE: ICD-10-CM

## 2022-03-02 DIAGNOSIS — R94.31 ABNORMAL EKG: ICD-10-CM

## 2022-03-02 DIAGNOSIS — R00.2 PALPITATIONS: ICD-10-CM

## 2022-03-02 DIAGNOSIS — R03.0 ELEVATED BLOOD PRESSURE READING: Primary | ICD-10-CM

## 2022-03-02 LAB
2HR DELTA HS TROPONIN: 0 NG/L
ALBUMIN SERPL BCP-MCNC: 4.4 G/DL (ref 3.5–5)
ALP SERPL-CCNC: 81 U/L (ref 46–116)
ALT SERPL W P-5'-P-CCNC: 18 U/L (ref 12–78)
ANION GAP SERPL CALCULATED.3IONS-SCNC: 5 MMOL/L (ref 4–13)
AST SERPL W P-5'-P-CCNC: 9 U/L (ref 5–45)
ATRIAL RATE: 78 BPM
BASOPHILS # BLD AUTO: 0.04 THOUSANDS/ΜL (ref 0–0.1)
BASOPHILS NFR BLD AUTO: 1 % (ref 0–1)
BILIRUB SERPL-MCNC: 1.6 MG/DL (ref 0.2–1)
BUN SERPL-MCNC: 7 MG/DL (ref 5–25)
CALCIUM SERPL-MCNC: 9.7 MG/DL (ref 8.3–10.1)
CARDIAC TROPONIN I PNL SERPL HS: 5 NG/L
CARDIAC TROPONIN I PNL SERPL HS: 5 NG/L
CHLORIDE SERPL-SCNC: 107 MMOL/L (ref 100–108)
CO2 SERPL-SCNC: 28 MMOL/L (ref 21–32)
CREAT SERPL-MCNC: 0.81 MG/DL (ref 0.6–1.3)
EOSINOPHIL # BLD AUTO: 0.21 THOUSAND/ΜL (ref 0–0.61)
EOSINOPHIL NFR BLD AUTO: 3 % (ref 0–6)
ERYTHROCYTE [DISTWIDTH] IN BLOOD BY AUTOMATED COUNT: 11.9 % (ref 11.6–15.1)
GFR SERPL CREATININE-BSD FRML MDRD: 108 ML/MIN/1.73SQ M
GLUCOSE SERPL-MCNC: 96 MG/DL (ref 65–140)
HCT VFR BLD AUTO: 44.3 % (ref 36.5–49.3)
HGB BLD-MCNC: 15.2 G/DL (ref 12–17)
IMM GRANULOCYTES # BLD AUTO: 0.01 THOUSAND/UL (ref 0–0.2)
IMM GRANULOCYTES NFR BLD AUTO: 0 % (ref 0–2)
LYMPHOCYTES # BLD AUTO: 1.79 THOUSANDS/ΜL (ref 0.6–4.47)
LYMPHOCYTES NFR BLD AUTO: 28 % (ref 14–44)
MCH RBC QN AUTO: 31.4 PG (ref 26.8–34.3)
MCHC RBC AUTO-ENTMCNC: 34.3 G/DL (ref 31.4–37.4)
MCV RBC AUTO: 92 FL (ref 82–98)
MONOCYTES # BLD AUTO: 0.42 THOUSAND/ΜL (ref 0.17–1.22)
MONOCYTES NFR BLD AUTO: 7 % (ref 4–12)
NEUTROPHILS # BLD AUTO: 4.04 THOUSANDS/ΜL (ref 1.85–7.62)
NEUTS SEG NFR BLD AUTO: 61 % (ref 43–75)
NRBC BLD AUTO-RTO: 0 /100 WBCS
P AXIS: 73 DEGREES
PLATELET # BLD AUTO: 301 THOUSANDS/UL (ref 149–390)
PMV BLD AUTO: 10 FL (ref 8.9–12.7)
POTASSIUM SERPL-SCNC: 3.7 MMOL/L (ref 3.5–5.3)
PR INTERVAL: 188 MS
PROT SERPL-MCNC: 8 G/DL (ref 6.4–8.2)
QRS AXIS: 82 DEGREES
QRSD INTERVAL: 112 MS
QT INTERVAL: 354 MS
QTC INTERVAL: 403 MS
RBC # BLD AUTO: 4.84 MILLION/UL (ref 3.88–5.62)
SODIUM SERPL-SCNC: 140 MMOL/L (ref 136–145)
T WAVE AXIS: -23 DEGREES
TSH SERPL DL<=0.05 MIU/L-ACNC: 0.64 UIU/ML (ref 0.36–3.74)
VENTRICULAR RATE: 78 BPM
WBC # BLD AUTO: 6.51 THOUSAND/UL (ref 4.31–10.16)

## 2022-03-02 PROCEDURE — 93010 ELECTROCARDIOGRAM REPORT: CPT | Performed by: INTERNAL MEDICINE

## 2022-03-02 PROCEDURE — 84484 ASSAY OF TROPONIN QUANT: CPT | Performed by: PHYSICIAN ASSISTANT

## 2022-03-02 PROCEDURE — 36415 COLL VENOUS BLD VENIPUNCTURE: CPT | Performed by: PHYSICIAN ASSISTANT

## 2022-03-02 PROCEDURE — 99285 EMERGENCY DEPT VISIT HI MDM: CPT

## 2022-03-02 PROCEDURE — 99285 EMERGENCY DEPT VISIT HI MDM: CPT | Performed by: PHYSICIAN ASSISTANT

## 2022-03-02 PROCEDURE — 80053 COMPREHEN METABOLIC PANEL: CPT | Performed by: PHYSICIAN ASSISTANT

## 2022-03-02 PROCEDURE — 85025 COMPLETE CBC W/AUTO DIFF WBC: CPT | Performed by: PHYSICIAN ASSISTANT

## 2022-03-02 PROCEDURE — 71045 X-RAY EXAM CHEST 1 VIEW: CPT

## 2022-03-02 PROCEDURE — 93005 ELECTROCARDIOGRAM TRACING: CPT

## 2022-03-02 PROCEDURE — 84443 ASSAY THYROID STIM HORMONE: CPT | Performed by: PHYSICIAN ASSISTANT

## 2022-03-02 RX ORDER — ACETAMINOPHEN 325 MG/1
650 TABLET ORAL ONCE
Status: COMPLETED | OUTPATIENT
Start: 2022-03-02 | End: 2022-03-02

## 2022-03-02 RX ADMIN — ACETAMINOPHEN 650 MG: 325 TABLET ORAL at 13:11

## 2022-03-02 NOTE — ED PROVIDER NOTES
History  Chief Complaint   Patient presents with    Hypertension     BP taken @ home @ 1215 - 165/94  "Pressure with inspiration"    Rapid Heart Rate     C/O heart racing   Headache     4/10 pain     The patient is a 49-year-old male with history of asthma who presents to the emergency department for evaluation of elevated blood pressure reading, palpitations and headache  The patient states measured his blood pressure at home, it was 165/94  He reports earlier today, he was experiencing some palpitations, but that has since resolved  He denies any chest pain  He does state he has a headache, any rates about a 4/10  He did take Tylenol for his headache this morning  His main concern and reason for being here today is the elevated blood pressure  He is not on any antihypertensive medications  He does state that he follows with a cardiologist, and he has been told in the past that he might need to get started on medication  He denies fever, chills, shortness of breath, nausea, vomiting, dizziness or lightheadedness  History provided by:  Patient   used: No        Prior to Admission Medications   Prescriptions Last Dose Informant Patient Reported?  Taking?   acetaminophen (TYLENOL) 500 mg tablet   No No   Sig: Take 1 tablet (500 mg total) by mouth every 6 (six) hours as needed for mild pain or moderate pain   albuterol (PROVENTIL HFA,VENTOLIN HFA) 90 mcg/act inhaler   No No   Sig: Inhale 2 puffs every 4 (four) hours as needed for wheezing   albuterol (VENTOLIN HFA) 90 mcg/act inhaler   Yes No   Sig: Inhale 1 puff every 6 (six) hours as needed   baclofen 10 mg tablet   No No   Sig: Take 1 tablet (10 mg total) by mouth 3 (three) times a day for 4 days   citalopram (CeleXA) 20 mg tablet   Yes No   Sig: Take 20 mg by mouth daily   fluticasone-vilanterol (BREO ELLIPTA) 200-25 MCG/INH inhaler   Yes No   Sig: Inhale 1 puff daily   naproxen (NAPROSYN) 500 mg tablet   No No   Sig: Take 1 tablet (500 mg total) by mouth 2 (two) times a day with meals   tiotropium (SPIRIVA RESPIMAT) 1 25 MCG/ACT AERS inhaler   Yes No   Sig: Inhale 2 5 mcg daily   triamcinolone (KENALOG) 0 1 % cream   No No   Sig: Apply topically 2 (two) times a day for 7 days      Facility-Administered Medications: None       Past Medical History:   Diagnosis Date    Asthma     Mitral valve prolapse        Past Surgical History:   Procedure Laterality Date    ROTATOR CUFF REPAIR Bilateral     2020 + 2017       History reviewed  No pertinent family history  I have reviewed and agree with the history as documented  E-Cigarette/Vaping    E-Cigarette Use Never User      E-Cigarette/Vaping Substances    Nicotine No     THC No     CBD No     Flavoring No     Other No     Unknown No      Social History     Tobacco Use    Smoking status: Never Smoker    Smokeless tobacco: Never Used   Vaping Use    Vaping Use: Never used   Substance Use Topics    Alcohol use: Not Currently     Comment: socially    Drug use: No       Review of Systems   Constitutional: Negative for chills and fever  HENT: Negative for ear pain and sore throat  Eyes: Negative for redness and visual disturbance  Respiratory: Negative for cough, shortness of breath and wheezing  Cardiovascular: Positive for palpitations  Negative for chest pain  Gastrointestinal: Negative for abdominal pain, diarrhea, nausea and vomiting  Genitourinary: Negative for dysuria and hematuria  Musculoskeletal: Negative for back pain, neck pain and neck stiffness  Skin: Negative for color change and rash  Neurological: Positive for headaches  Negative for dizziness and light-headedness  All other systems reviewed and are negative  Physical Exam  Physical Exam  Vitals and nursing note reviewed  Constitutional:       General: He is not in acute distress  Appearance: He is well-developed  He is not ill-appearing or toxic-appearing     HENT:      Head: Normocephalic and atraumatic  Eyes:      Pupils: Pupils are equal, round, and reactive to light  Cardiovascular:      Rate and Rhythm: Normal rate and regular rhythm  Heart sounds: Normal heart sounds  Pulmonary:      Effort: Pulmonary effort is normal       Breath sounds: Normal breath sounds  Abdominal:      General: There is no distension  Palpations: Abdomen is soft  Tenderness: There is no abdominal tenderness  There is no guarding or rebound  Musculoskeletal:      Cervical back: Normal range of motion and neck supple  Skin:     General: Skin is warm and dry  Neurological:      Mental Status: He is alert and oriented to person, place, and time           Vital Signs  ED Triage Vitals [03/02/22 1230]   Temperature Pulse Respirations Blood Pressure SpO2   98 4 °F (36 9 °C) 88 18 161/89 100 %      Temp Source Heart Rate Source Patient Position - Orthostatic VS BP Location FiO2 (%)   Oral Monitor Sitting Right arm --      Pain Score       4           Vitals:    03/02/22 1230 03/02/22 1330   BP: 161/89 141/75   Pulse: 88 72   Patient Position - Orthostatic VS: Sitting          Visual Acuity      ED Medications  Medications   acetaminophen (TYLENOL) tablet 650 mg (650 mg Oral Given 3/2/22 1311)       Diagnostic Studies  Results Reviewed     Procedure Component Value Units Date/Time    HS Troponin I 2hr [[id number]]  (Normal) Collected: 03/02/22 1531    Lab Status: Final result Specimen: Blood from Arm, Right Updated: 03/02/22 1604     hs TnI 2hr 5 ng/L      Delta 2hr hsTnI 0 ng/L     Comprehensive metabolic panel [[id number]]  (Abnormal) Collected: 03/02/22 1308    Lab Status: Final result Specimen: Blood from Arm, Right Updated: 03/02/22 1459     Sodium 140 mmol/L      Potassium 3 7 mmol/L      Chloride 107 mmol/L      CO2 28 mmol/L      ANION GAP 5 mmol/L      BUN 7 mg/dL      Creatinine 0 81 mg/dL      Glucose 96 mg/dL      Calcium 9 7 mg/dL      AST 9 U/L      ALT 18 U/L      Alkaline Phosphatase 81 U/L      Total Protein 8 0 g/dL      Albumin 4 4 g/dL      Total Bilirubin 1 60 mg/dL      eGFR 108 ml/min/1 73sq m     Narrative:      [place] guidelines for Chronic Kidney Disease (CKD):     Stage 1 with normal or high GFR (GFR > 90 mL/min/1 73 square meters)    Stage 2 Mild CKD (GFR = 60-89 mL/min/1 73 square meters)    Stage 3A Moderate CKD (GFR = 45-59 mL/min/1 73 square meters)    Stage 3B Moderate CKD (GFR = 30-44 mL/min/1 73 square meters)    Stage 4 Severe CKD (GFR = 15-29 mL/min/1 73 square meters)    Stage 5 End Stage CKD (GFR <15 mL/min/1 73 square meters)  Note: GFR calculation is accurate only with a steady state creatinine    TSH [[id number]]  (Normal) Collected: 03/02/22 1308    Lab Status: Final result Specimen: Blood from Arm, Right Updated: 03/02/22 1459     TSH 3RD GENERATON 0 640 uIU/mL     Narrative:      Patients undergoing fluorescein dye angiography may retain small amounts of fluorescein in the body for 48-72 hours post procedure  Samples containing fluorescein can produce falsely depressed TSH values  If the patient had this procedure,a specimen should be resubmitted post fluorescein clearance        HS Troponin 0hr (reflex protocol) [[id number]]  (Normal) Collected: 03/02/22 1308    Lab Status: Final result Specimen: Blood from Arm, Right Updated: 03/02/22 1400     hs TnI 0hr 5 ng/L     CBC and differential [[id number]] Collected: 03/02/22 1308    Lab Status: Final result Specimen: Blood from Arm, Right Updated: 03/02/22 1317     WBC 6 51 Thousand/uL      RBC 4 84 Million/uL      Hemoglobin 15 2 g/dL      Hematocrit 44 3 %      MCV 92 fL      MCH 31 4 pg      MCHC 34 3 g/dL      RDW 11 9 %      MPV 10 0 fL      Platelets 948 Thousands/uL      nRBC 0 /100 WBCs      Neutrophils Relative 61 %      Immat GRANS % 0 %      Lymphocytes Relative 28 %      Monocytes Relative 7 %      Eosinophils Relative 3 %      Basophils Relative 1 %      Neutrophils Absolute 4 04 Thousands/µL      Immature Grans Absolute 0 01 Thousand/uL      Lymphocytes Absolute 1 79 Thousands/µL      Monocytes Absolute 0 42 Thousand/µL      Eosinophils Absolute 0 21 Thousand/µL      Basophils Absolute 0 04 Thousands/µL                  XR chest 1 view portable   Final Result by Harmony Martin MD (03/02 1424)   No acute cardiopulmonary findings  Workstation performed: [id number]                    Procedures  ECG 12 Lead Documentation Only    Date/Time: 3/2/2022 4:17 PM  Performed by: Nelly Bynum PA-C  Authorized by: Keyonna Olivares PA-C     Comments:      Normal sinus rhythm at 78  Normal axis  Inverted T-waves in inferior leads  This is not new when compared to previous, however it appears more evident  Acute ST-T changes  ED Course  ED Course as of 03/02/22 1623   Wed Mar 02, 2022   1449 Patient is resting comfortably  Reports resolution of headache at this time  [address] [facility] hsTnI: 0             HEART Risk Score      Most Recent Value   Heart Score Risk Calculator    History 0 Filed at: 03/02/2022 1615   ECG 1 Filed at: 03/02/2022 1615   Age 0 Filed at: 03/02/2022 1615   Risk Factors 1 Filed at: 03/02/2022 1615   Troponin 0 Filed at: 03/02/2022 1615   HEART Score 2 Filed at: 03/02/2022 1615                                      MDM  Number of Diagnoses or Management Options  Abnormal EKG: new and requires workup  Elevated blood pressure reading: new and requires workup  Headache: new and requires workup  Palpitations: new and requires workup  Diagnosis management comments: Patient presents for evaluation of elevated blood pressure reading with associated palpitations and headache  Differential includes but is limited to hypertensive urgency versus hypertensive emergency versus arrhythmia versus ACS  On my initial evaluation, the patient is well appearing  He is denying any chest pain, and he states his palpitations have resolved  His only complaint at this time is an ongoing 4/10 headache  Blood pressure is mildly elevated in the 062 systolic  Will monitor  Labs, imaging and EKG were ordered  Labs reviewed with no significant findings  Patient had 2 troponin levels done well in the ED, both of which were normal with a delta change of 0  Patient has a heart score of 2, indicating overall low risk for ACS  Chest x-ray is within normal limits  EKG does show T-wave inversions in the inferior leads, but this does not appear new from previous EKG  Patient does have a cardiologist, and I did encourage him to have close follow-up with Cardiology  Patient remained asymptomatic for duration of the ED stay  Blood pressure was rechecked several times, and improved  At the time of discharge, the patient's blood pressure was 141/75 without any intervention  Patient was advised to return to the ED with any new or worsening symptoms  Patient is stable for discharge         Amount and/or Complexity of Data Reviewed  Clinical lab tests: ordered and reviewed  Tests in the radiology section of CPT®: reviewed and ordered  Decide to obtain previous medical records or to obtain history from someone other than the patient: yes  Review and summarize past medical records: yes    Risk of Complications, Morbidity, and/or Mortality  Presenting problems: low  Diagnostic procedures: low  Management options: low    Patient Progress  Patient progress: improved      Disposition  Final diagnoses:   Elevated blood pressure reading   Headache   Palpitations   Abnormal EKG     Time reflects when diagnosis was documented in both MDM as applicable and the Disposition within this note     Time User Action Codes Description Comment    3/2/2022  4:09 PM Keyonna Bernabe Add [R03 0] Elevated blood pressure reading     3/2/2022  4:09 PM [facility] [employer] Add [R51 9] Headache     3/2/2022  4:09 PM Keyonna Bernabe Add [R00 2] Palpitations     3/2/2022  4:15 PM Saundra Nance Add [R94 31] Abnormal EKG       ED Disposition     ED Disposition Condition Date/Time Comment    Discharge Stable Wed Mar 2, 2022  4:09 PM Critical access hospital discharge to home/self care  Follow-up Information     Follow up With Specialties Details Why Contact Info Additional Information    Timothy Palmer  Gerontology, Nurse Practitioner Schedule an appointment as soon as possible for a visit   [address]  [phone]       Please follow-up with your cardiologist as discussed           [address] [place] [address] 34 [facility] Emergency Department Emergency Medicine  If symptoms worsen [address]  958 [facility] 64 [facility] Emergency Department, [address], [facility] [place], [facility] 108          Discharge Medication List as of 3/2/2022  4:10 PM      CONTINUE these medications which have NOT CHANGED    Details   acetaminophen (TYLENOL) 500 mg tablet Take 1 tablet (500 mg total) by mouth every 6 (six) hours as needed for mild pain or moderate pain, Starting Tue 7/30/2019, Print      !! albuterol (PROVENTIL HFA,VENTOLIN HFA) 90 mcg/act inhaler Inhale 2 puffs every 4 (four) hours as needed for wheezing, Starting Sat 4/17/2021, Normal      !! albuterol (VENTOLIN HFA) 90 mcg/act inhaler Inhale 1 puff every 6 (six) hours as needed, Starting Mon 3/11/2019, Historical Med      baclofen 10 mg tablet Take 1 tablet (10 mg total) by mouth 3 (three) times a day for 4 days, Starting Wed 9/25/2019, Until Sun 9/29/2019, Print      citalopram (CeleXA) 20 mg tablet Take 20 mg by mouth daily, Historical Med      fluticasone-vilanterol (BREO ELLIPTA) 200-25 MCG/INH inhaler Inhale 1 puff daily, Starting Mon 8/20/2018, Historical Med      naproxen (NAPROSYN) 500 mg tablet Take 1 tablet (500 mg total) by mouth 2 (two) times a day with meals, Starting Wed 9/30/2020, Normal      tiotropium (SPIRIVA RESPIMAT) 1 25 MCG/ACT AERS inhaler Inhale 2 5 mcg daily, Starting Mon 8/20/2018, Historical Med      triamcinolone (KENALOG) 0 1 % cream Apply topically 2 (two) times a day for 7 days, Starting Sun 10/10/2021, Until Sun 10/17/2021, Normal       !! - Potential duplicate medications found  Please discuss with provider  No discharge procedures on file      PDMP Review     None          ED Provider  Electronically Signed by           Juan Manuel Wen PA-C  03/02/22 3936

## 2022-06-15 ENCOUNTER — HOSPITAL ENCOUNTER (EMERGENCY)
Facility: HOSPITAL | Age: 42
Discharge: HOME | End: 2022-06-15
Attending: EMERGENCY MEDICINE | Admitting: EMERGENCY MEDICINE
Payer: MEDICARE

## 2022-06-15 VITALS
DIASTOLIC BLOOD PRESSURE: 66 MMHG | TEMPERATURE: 97.9 F | SYSTOLIC BLOOD PRESSURE: 144 MMHG | RESPIRATION RATE: 18 BRPM | OXYGEN SATURATION: 97 % | HEART RATE: 77 BPM

## 2022-06-15 DIAGNOSIS — R11.0 NAUSEA: ICD-10-CM

## 2022-06-15 DIAGNOSIS — R03.0 ELEVATED BLOOD PRESSURE READING: ICD-10-CM

## 2022-06-15 DIAGNOSIS — H81.10 BPPV (BENIGN PAROXYSMAL POSITIONAL VERTIGO): Primary | ICD-10-CM

## 2022-06-15 PROCEDURE — 99284 EMERGENCY DEPT VISIT MOD MDM: CPT | Performed by: EMERGENCY MEDICINE

## 2022-06-15 PROCEDURE — 99283 EMERGENCY DEPT VISIT LOW MDM: CPT

## 2022-06-15 RX ORDER — METOCLOPRAMIDE 10 MG/1
10 TABLET ORAL ONCE
Status: COMPLETED | OUTPATIENT
Start: 2022-06-15 | End: 2022-06-15

## 2022-06-15 RX ORDER — MECLIZINE HYDROCHLORIDE 25 MG/1
25 TABLET ORAL ONCE
Status: COMPLETED | OUTPATIENT
Start: 2022-06-15 | End: 2022-06-15

## 2022-06-15 RX ORDER — MECLIZINE HYDROCHLORIDE 25 MG/1
25 TABLET ORAL 3 TIMES DAILY PRN
Qty: 30 TABLET | Refills: 0 | Status: SHIPPED | OUTPATIENT
Start: 2022-06-15

## 2022-06-15 RX ORDER — ONDANSETRON 4 MG/1
4 TABLET, ORALLY DISINTEGRATING ORAL ONCE
Status: COMPLETED | OUTPATIENT
Start: 2022-06-15 | End: 2022-06-15

## 2022-06-15 RX ORDER — ONDANSETRON 4 MG/1
4 TABLET, ORALLY DISINTEGRATING ORAL EVERY 6 HOURS PRN
Qty: 20 TABLET | Refills: 0 | Status: SHIPPED | OUTPATIENT
Start: 2022-06-15

## 2022-06-15 RX ADMIN — METOCLOPRAMIDE 10 MG: 10 TABLET ORAL at 05:15

## 2022-06-15 RX ADMIN — ONDANSETRON 4 MG: 4 TABLET, ORALLY DISINTEGRATING ORAL at 04:38

## 2022-06-15 RX ADMIN — MECLIZINE HYDROCHLORIDE 25 MG: 25 TABLET ORAL at 04:38

## 2022-06-15 NOTE — DISCHARGE INSTRUCTIONS
Follow-up with PCP for further care  Contact info provided below if needed  Call today to schedule your follow-up appointment  Take your new medications as prescribed as needed for further symptom control  Return to the ED with new or worsening symptoms

## 2022-06-15 NOTE — ED ATTENDING ATTESTATION
6/15/2022  IJesus MD, saw and evaluated the patient  I have discussed the patient with the resident/non-physician practitioner and agree with the resident's/non-physician practitioner's findings, Plan of Care, and MDM as documented in the resident's/non-physician practitioner's note, except where noted  All available labs and Radiology studies were reviewed  I was present for key portions of any procedure(s) performed by the resident/non-physician practitioner and I was immediately available to provide assistance  At this point I agree with the current assessment done in the Emergency Department  I have conducted an independent evaluation of this patient a history and physical is as follows:    ED Course     Emergency Department Note- Kam Greenberg 38 y o  male MRN: [id number]    Unit/Bed#: ED 03 Encounter: [id number]    Kam Greenberg is a 38 y o  male who presents with   Chief Complaint   Patient presents with    High Blood Pressure     Pt states he woke up to check on his daughter and his neck was tight so he checked his BP that was 165/92         History of Present Illness   HPI:  Kam Greenberg is a 38 y o  male who presents for evaluation of:  Dizzy and a concern about elevated blood pressure  Patient noted the onset of symptoms hours ago  Patient denies associated HA  Patient does note associated nausea and vomiting  Review of Systems   Constitutional: Negative for chills and fever  HENT: Negative for congestion and rhinorrhea  Respiratory: Negative for cough and shortness of breath  Cardiovascular: Positive for palpitations  Negative for chest pain  Gastrointestinal: Negative for abdominal distention and abdominal pain  Neurological: Positive for dizziness  Negative for headaches  All other systems reviewed and are negative        Historical Information   Past Medical History:   Diagnosis Date    Asthma     Mitral valve prolapse      Past Surgical History:   Procedure Laterality Date    ROTATOR CUFF REPAIR Bilateral     2020 + 2017     Social History   Social History     Substance and Sexual Activity   Alcohol Use Not Currently    Comment: socially     Social History     Substance and Sexual Activity   Drug Use No     Social History     Tobacco Use   Smoking Status Never Smoker   Smokeless Tobacco Never Used     Family History: History reviewed  No pertinent family history  Meds/Allergies   PTA meds:   Prior to Admission Medications   Prescriptions Last Dose Informant Patient Reported?  Taking?   acetaminophen (TYLENOL) 500 mg tablet   No No   Sig: Take 1 tablet (500 mg total) by mouth every 6 (six) hours as needed for mild pain or moderate pain   albuterol (PROVENTIL HFA,VENTOLIN HFA) 90 mcg/act inhaler   No No   Sig: Inhale 2 puffs every 4 (four) hours as needed for wheezing   albuterol (VENTOLIN HFA) 90 mcg/act inhaler   Yes No   Sig: Inhale 1 puff every 6 (six) hours as needed   baclofen 10 mg tablet   No No   Sig: Take 1 tablet (10 mg total) by mouth 3 (three) times a day for 4 days   citalopram (CeleXA) 20 mg tablet   Yes No   Sig: Take 20 mg by mouth daily   fluticasone-vilanterol (BREO ELLIPTA) 200-25 MCG/INH inhaler   Yes No   Sig: Inhale 1 puff daily   naproxen (NAPROSYN) 500 mg tablet   No No   Sig: Take 1 tablet (500 mg total) by mouth 2 (two) times a day with meals   tiotropium (SPIRIVA RESPIMAT) 1 25 MCG/ACT AERS inhaler   Yes No   Sig: Inhale 2 5 mcg daily   triamcinolone (KENALOG) 0 1 % cream   No No   Sig: Apply topically 2 (two) times a day for 7 days      Facility-Administered Medications: None     Allergies   Allergen Reactions    Aspirin Hives    Caffeine - Food Allergy Hives    Penicillins        Objective   First Vitals:   Blood Pressure: 154/82 (06/15/22 0417)  Pulse: 77 (06/15/22 0417)  Temperature: 97 9 °F (36 6 °C) (06/15/22 0420)  Temp Source: Oral (06/15/22 0420)  Respirations: 18 (06/15/22 0417)  SpO2: 97 % (06/15/22 0417)    Current Vitals: Blood Pressure: 154/82 (06/15/22 0417)  Pulse: 77 (06/15/22 0417)  Temperature: 97 9 °F (36 6 °C) (06/15/22 0420)  Temp Source: Oral (06/15/22 0420)  Respirations: 18 (06/15/22 0417)  SpO2: 97 % (06/15/22 0417)    No intake or output data in the 24 hours ending 06/15/22 0507    Invasive Devices  Report    None                 Physical Exam  Vitals and nursing note reviewed  Constitutional:       General: He is not in acute distress  Appearance: Normal appearance  He is well-developed  HENT:      Head: Normocephalic and atraumatic  Right Ear: External ear normal       Left Ear: External ear normal       Nose: Nose normal       Mouth/Throat:      Pharynx: No oropharyngeal exudate  Eyes:      Conjunctiva/sclera: Conjunctivae normal       Pupils: Pupils are equal, round, and reactive to light  Cardiovascular:      Rate and Rhythm: Normal rate and regular rhythm  Pulmonary:      Effort: Pulmonary effort is normal  No respiratory distress  Abdominal:      General: Abdomen is flat  There is no distension  Palpations: Abdomen is soft  Musculoskeletal:         General: No deformity  Normal range of motion  Cervical back: Normal range of motion and neck supple  Skin:     General: Skin is warm and dry  Capillary Refill: Capillary refill takes less than 2 seconds  Neurological:      General: No focal deficit present  Mental Status: He is alert and oriented to person, place, and time  Mental status is at baseline  Coordination: Coordination normal    Psychiatric:         Mood and Affect: Mood normal          Behavior: Behavior normal          Thought Content: Thought content normal          Judgment: Judgment normal            Medical Decision Makin  Dizzy; nausea; vomiting: anti emetics  2  HTN: mild elevation in ED/ f/u with PCP    No results found for this or any previous visit (from the past 36 hour(s))    No orders to display         Portions of the record may have been created with voice recognition software  Occasional wrong word or "sound a like" substitutions may have occurred due to the inherent limitations of voice recognition software  Read the chart carefully and recognize, using context, where substitutions have occurred          Critical Care Time  Procedures

## 2022-06-15 NOTE — ED PROVIDER NOTES
History  Chief Complaint   Patient presents with    High Blood Pressure     Pt states he woke up to check on his daughter and his neck was tight so he checked his BP that was 165/92     Pt is a 37yo M who presents for high blood pressure and neck pain  Pt reports that mars riggins woke up to check on his child and noticed that his neck felt tight  He reports that he took his BP at this time and it was 'high'  He also reports dizziness that he describes as room spinning  Pt states that this has happened before  He reports that he has been able to ambulate, but while doing so felt as though he was going to fall  Denies any falls  Reports signficant improvement in dizziness while lying still and worsening with movement  In regards to patient's BP, he is not currently on antihypertensives but was told to track his BP and record it for his next PCP appointment  Per chart review, pt has been seen in the ED before for concerns over his BP  Pt denies any recent trauma or falls  Pt states his entire neck feels tight, not one specific area  Prior to Admission Medications   Prescriptions Last Dose Informant Patient Reported?  Taking?   acetaminophen (TYLENOL) 500 mg tablet   No No   Sig: Take 1 tablet (500 mg total) by mouth every 6 (six) hours as needed for mild pain or moderate pain   albuterol (PROVENTIL HFA,VENTOLIN HFA) 90 mcg/act inhaler   No No   Sig: Inhale 2 puffs every 4 (four) hours as needed for wheezing   albuterol (VENTOLIN HFA) 90 mcg/act inhaler   Yes No   Sig: Inhale 1 puff every 6 (six) hours as needed   baclofen 10 mg tablet   No No   Sig: Take 1 tablet (10 mg total) by mouth 3 (three) times a day for 4 days   citalopram (CeleXA) 20 mg tablet   Yes No   Sig: Take 20 mg by mouth daily   fluticasone-vilanterol (BREO ELLIPTA) 200-25 MCG/INH inhaler   Yes No   Sig: Inhale 1 puff daily   naproxen (NAPROSYN) 500 mg tablet   No No   Sig: Take 1 tablet (500 mg total) by mouth 2 (two) times a day with meals tiotropium (SPIRIVA RESPIMAT) 1 25 MCG/ACT AERS inhaler   Yes No   Sig: Inhale 2 5 mcg daily   triamcinolone (KENALOG) 0 1 % cream   No No   Sig: Apply topically 2 (two) times a day for 7 days      Facility-Administered Medications: None       Past Medical History:   Diagnosis Date    Asthma     Mitral valve prolapse        Past Surgical History:   Procedure Laterality Date    ROTATOR CUFF REPAIR Bilateral     2020 + 2017       History reviewed  No pertinent family history  I have reviewed and agree with the history as documented  E-Cigarette/Vaping    E-Cigarette Use Never User      E-Cigarette/Vaping Substances    Nicotine No     THC No     CBD No     Flavoring No     Other No     Unknown No      Social History     Tobacco Use    Smoking status: Never Smoker    Smokeless tobacco: Never Used   Vaping Use    Vaping Use: Never used   Substance Use Topics    Alcohol use: Not Currently     Comment: socially    Drug use: No        Review of Systems   Gastrointestinal: Positive for nausea and vomiting  Musculoskeletal: Positive for neck pain  Neurological: Positive for dizziness  Psychiatric/Behavioral: The patient is nervous/anxious  All other systems reviewed and are negative  Physical Exam  ED Triage Vitals   Temperature Pulse Respirations Blood Pressure SpO2   06/15/22 0420 06/15/22 0417 06/15/22 0417 06/15/22 0417 06/15/22 0417   97 9 °F (36 6 °C) 77 18 154/82 97 %      Temp Source Heart Rate Source Patient Position - Orthostatic VS BP Location FiO2 (%)   06/15/22 0420 06/15/22 0417 -- -- --   Oral Monitor         Pain Score       --                    Orthostatic Vital Signs  Vitals:    06/15/22 0417 06/15/22 0601   BP: 154/82 144/66   Pulse: 77        Physical Exam  Vitals reviewed  Constitutional:       General: He is not in acute distress  Appearance: He is well-developed  He is not toxic-appearing or diaphoretic  HENT:      Head: Normocephalic and atraumatic  Right Ear: Tympanic membrane and external ear normal       Left Ear: Tympanic membrane and external ear normal       Nose: Nose normal       Mouth/Throat:      Pharynx: Oropharynx is clear  Eyes:      General: No visual field deficit  Extraocular Movements: Extraocular movements intact  Right eye: Normal extraocular motion and no nystagmus  Left eye: Normal extraocular motion and no nystagmus  Pupils: Pupils are equal, round, and reactive to light  Cardiovascular:      Rate and Rhythm: Normal rate and regular rhythm  Heart sounds: Normal heart sounds  Pulmonary:      Effort: Pulmonary effort is normal  No respiratory distress  Breath sounds: Normal breath sounds  Abdominal:      General: There is no distension  Palpations: Abdomen is soft  Tenderness: There is no abdominal tenderness  Musculoskeletal:         General: Normal range of motion  Cervical back: Normal range of motion  No rigidity  Right lower leg: No edema  Left lower leg: No edema  Skin:     General: Skin is warm and dry  Capillary Refill: Capillary refill takes less than 2 seconds  Coloration: Skin is not pale  Findings: No erythema or rash  Neurological:      Mental Status: He is alert and oriented to person, place, and time  Cranial Nerves: No cranial nerve deficit, dysarthria or facial asymmetry  Sensory: No sensory deficit  Motor: No weakness, atrophy or abnormal muscle tone  Coordination: Coordination normal  Finger-Nose-Finger Test normal       Gait: Gait is intact  Psychiatric:         Speech: Speech normal          Behavior: Behavior is cooperative           ED Medications  Medications   ondansetron (ZOFRAN-ODT) dispersible tablet 4 mg (4 mg Oral Given 6/15/22 4708)   meclizine (ANTIVERT) tablet 25 mg (25 mg Oral Given 6/15/22 2078)   metoclopramide (REGLAN) tablet 10 mg (10 mg Oral Given 6/15/22 3013)       Diagnostic Studies  Results Reviewed     None                 No orders to display         Procedures  Procedures      ED Course  ED Course as of 06/27/22 0856   Wed Yehuda 15, 2022   0313 Reports continued vomiting following Zofran  Will also order Reglan  SBIRT 22yo+    Flowsheet Row Most Recent Value   SBIRT (23 yo +)    In order to provide better care to our patients, we are screening all of our patients for alcohol and drug use  Would it be okay to ask you these screening questions? Unable to answer at this time Filed at: 06/15/2022 0509                MDM  Number of Diagnoses or Management Options  BPPV (benign paroxysmal positional vertigo)  Elevated blood pressure reading  Nausea  Diagnosis management comments: Pt is a 35yo M who presents with dizziness and concern for hypertension  Exam pertinent for no neurologic deficits in well appearing male  Differential diagnosis to include but not limited to central vs peripheral vertigo, anxiety about medical condition  Based on patient's exam and the fact that his symptoms resolve while still and worsen with movement as well as no neuro deficits including gait, likely peripheral in origin  Will treat symptomatically  In regards to pt's neck pain, will treat symptomatically  No midline tenderness or concern for trauma requiring imaging at this time  In regards to pt's BP, there is no evidence of hypertensive emergency requiring acute lowering of BP  Will continue to monitor throughout visit and recommend follow-up with PCP  Pt's symptoms improved following medication administration and plan for DC discussed  Pt in agreement with plan  Pt able to ambulate out of the department without difficulty  Plan to discharge pt with f/u to PCP  Discussed returning the ED with significant worsening of symptoms  Discussed use of over the counter medications as stated on the bottle as needed for pain  Discussed taking new medication as prescribed   Pt expressed understanding of discharge instructions, return precautions, and medication instructions  All questions were answered and pt was discharged without incident  Disposition  Final diagnoses:   BPPV (benign paroxysmal positional vertigo)   Elevated blood pressure reading   Nausea     Time reflects when diagnosis was documented in both MDM as applicable and the Disposition within this note     Time User Action Codes Description Comment    6/15/2022  6:01 AM Erie Catching Add [H81 10] BPPV (benign paroxysmal positional vertigo)     6/15/2022  6:01 AM Ed Catching Add [R03 0] Elevated blood pressure reading     6/15/2022  6:02 AM Ed Catching Add [R11 0] Nausea       ED Disposition     ED Disposition   Discharge    Condition   Stable    Date/Time   Wed Yehuda 15, 2022  6:03 AM    [address] [place] [address] discharge to home/self care                 Follow-up Information     Follow up With Specialties Details Why Contact Info    Oscar Kline, [facility], Nurse Practitioner Call on 6/15/2022  [address] [facility] [address]  [phone]            Discharge Medication List as of 6/15/2022  6:04 AM      START taking these medications    Details   meclizine (ANTIVERT) 25 mg tablet Take 1 tablet (25 mg total) by mouth 3 (three) times a day as needed for dizziness, Starting Wed 6/15/2022, Normal      ondansetron (Zofran ODT) 4 mg disintegrating tablet Take 1 tablet (4 mg total) by mouth every 6 (six) hours as needed for nausea or vomiting, Starting Wed 6/15/2022, Normal         CONTINUE these medications which have NOT CHANGED    Details   acetaminophen (TYLENOL) 500 mg tablet Take 1 tablet (500 mg total) by mouth every 6 (six) hours as needed for mild pain or moderate pain, Starting Tue 7/30/2019, Print      !! albuterol (PROVENTIL HFA,VENTOLIN HFA) 90 mcg/act inhaler Inhale 2 puffs every 4 (four) hours as needed for wheezing, Starting Sat 4/17/2021, Normal      !! albuterol (VENTOLIN HFA) 90 mcg/act inhaler Inhale 1 puff every 6 (six) hours as needed, Starting Mon 3/11/2019, Historical Med      baclofen 10 mg tablet Take 1 tablet (10 mg total) by mouth 3 (three) times a day for 4 days, Starting Wed 9/25/2019, Until Sun 9/29/2019, Print      citalopram (CeleXA) 20 mg tablet Take 20 mg by mouth daily, Historical Med      fluticasone-vilanterol (BREO ELLIPTA) 200-25 MCG/INH inhaler Inhale 1 puff daily, Starting Mon 8/20/2018, Historical Med      naproxen (NAPROSYN) 500 mg tablet Take 1 tablet (500 mg total) by mouth 2 (two) times a day with meals, Starting Wed 9/30/2020, Normal      tiotropium (SPIRIVA RESPIMAT) 1 25 MCG/ACT AERS inhaler Inhale 2 5 mcg daily, Starting Mon 8/20/2018, Historical Med      triamcinolone (KENALOG) 0 1 % cream Apply topically 2 (two) times a day for 7 days, Starting Sun 10/10/2021, Until Sun 10/17/2021, Normal       !! - Potential duplicate medications found  Please discuss with provider  No discharge procedures on file  PDMP Review     None           ED Provider  Attending physically available and evaluated [facility]  I managed the patient along with the ED Attending      Electronically Signed by         Hemanth Cai MD  06/27/22 9118

## 2022-08-25 ENCOUNTER — OFFICE VISIT (OUTPATIENT)
Dept: DENTISTRY | Facility: CLINIC | Age: 42
End: 2022-08-25

## 2022-08-25 VITALS — HEART RATE: 81 BPM | SYSTOLIC BLOOD PRESSURE: 124 MMHG | DIASTOLIC BLOOD PRESSURE: 74 MMHG | TEMPERATURE: 97.6 F

## 2022-08-25 DIAGNOSIS — Z01.21 ENCOUNTER FOR DENTAL EXAMINATION AND CLEANING WITH ABNORMAL FINDINGS: Primary | ICD-10-CM

## 2022-08-25 PROCEDURE — D0120 PERIODIC ORAL EVALUATION - ESTABLISHED PATIENT: HCPCS | Performed by: DENTIST

## 2022-08-25 PROCEDURE — D0274 BITEWINGS - 4 RADIOGRAPHIC IMAGES: HCPCS

## 2022-08-25 RX ORDER — PREDNISONE 10 MG/1
TABLET ORAL
COMMUNITY
Start: 2012-08-15

## 2022-08-25 RX ORDER — TRAZODONE HYDROCHLORIDE 50 MG/1
50 TABLET ORAL
COMMUNITY

## 2022-08-29 NOTE — PROGRESS NOTES
Periodic Exam with 4 bite wings    Reviewed medical hist with patient in Epic  ASA -I    Pts CC = " My gims are bleeding when I brush them "    Patient was last in the clinic for preventative  treatment in 2019  Today we updated x-rays with a periodic exam     4 bite wings - obtained    Perio charting - Posterior areas in all quads are 4-5 mm with BUP  Heavy sub ging calc on BWX  Indication for SRPs in all quads  Periodic exam - completed By AK ANPNV  - #30-O decay  - #18-smooth ML cusp of resin  - Pre D SRPs in all quads      [facility]    NV:SRP UR/LR  NV2:SRP UL/LL  NV3:re eval post SRPs

## 2022-09-16 ENCOUNTER — HOSPITAL ENCOUNTER (EMERGENCY)
Facility: HOSPITAL | Age: 42
Discharge: HOME | End: 2022-09-16
Attending: EMERGENCY MEDICINE
Payer: MEDICARE

## 2022-09-16 VITALS
OXYGEN SATURATION: 98 % | SYSTOLIC BLOOD PRESSURE: 144 MMHG | DIASTOLIC BLOOD PRESSURE: 88 MMHG | HEART RATE: 89 BPM | RESPIRATION RATE: 18 BRPM | TEMPERATURE: 98.2 F

## 2022-09-16 DIAGNOSIS — R00.2 PALPITATIONS: ICD-10-CM

## 2022-09-16 DIAGNOSIS — R51.9 HEADACHE: Primary | ICD-10-CM

## 2022-09-16 LAB
ATRIAL RATE: 73 BPM
P AXIS: 70 DEGREES
PR INTERVAL: 180 MS
QRS AXIS: 67 DEGREES
QRSD INTERVAL: 108 MS
QT INTERVAL: 364 MS
QTC INTERVAL: 401 MS
T WAVE AXIS: 29 DEGREES
VENTRICULAR RATE: 73 BPM

## 2022-09-16 PROCEDURE — 93005 ELECTROCARDIOGRAM TRACING: CPT

## 2022-09-16 PROCEDURE — 93010 ELECTROCARDIOGRAM REPORT: CPT | Performed by: INTERNAL MEDICINE

## 2022-09-16 PROCEDURE — 99283 EMERGENCY DEPT VISIT LOW MDM: CPT

## 2022-09-16 PROCEDURE — 99282 EMERGENCY DEPT VISIT SF MDM: CPT | Performed by: EMERGENCY MEDICINE

## 2022-09-16 NOTE — ED ATTENDING ATTESTATION
9/16/2022  I, Rina Baumgarten, MD, saw and evaluated the patient  I have discussed the patient with the resident/non-physician practitioner and agree with the resident's/non-physician practitioner's findings, Plan of Care, and MDM as documented in the resident's/non-physician practitioner's note, except where noted  All available labs and Radiology studies were reviewed  I was present for key portions of any procedure(s) performed by the resident/non-physician practitioner and I was immediately available to provide assistance  At this point I agree with the current assessment done in the Emergency Department  I have conducted an independent evaluation of this patient a history and physical is as follows:   pt states he had a ha that was similar to previous ha Pt had bilateral leg numbness at that time   All of this has resolved Pt then had palpitations lasting 5 minutes no cp no sob PE; alert nad heart reg lungs clear abd soft nontender ext nad neuro nonfocal MDM: will check ekg   ED Course         Critical Care Time  Procedures

## 2022-09-16 NOTE — ED PROVIDER NOTES
History  Chief Complaint   Patient presents with    Headache     Pt c/o head ache since this AM  Pt took tylenol with no relief  HPI     53-year-old male presenting for evaluation of headache, palpitations and shortness of breath  Patient woke up with a headache this morning, took Tylenol with improvement of his headache  Approximately 11:00 a m , he had an episode of palpitations and shortness of breath that lasted approximately 5 minutes and self-resolved  Due to these new symptoms, decided to come in for evaluation  Time of evaluation the patient denies any symptoms  This episode was not associated with chest pain  No prior episodes of palpitations  Prior to Admission Medications   Prescriptions Last Dose Informant Patient Reported?  Taking?   acetaminophen (TYLENOL) 500 mg tablet   No No   Sig: Take 1 tablet (500 mg total) by mouth every 6 (six) hours as needed for mild pain or moderate pain   Patient not taking: Reported on 8/25/2022   albuterol (PROVENTIL HFA,VENTOLIN HFA) 90 mcg/act inhaler   Yes No   Sig: Inhale 1 puff every 6 (six) hours as needed   albuterol (PROVENTIL HFA,VENTOLIN HFA) 90 mcg/act inhaler   No No   Sig: Inhale 2 puffs every 4 (four) hours as needed for wheezing   baclofen 10 mg tablet   No No   Sig: Take 1 tablet (10 mg total) by mouth 3 (three) times a day for 4 days   citalopram (CeleXA) 20 mg tablet   Yes No   Sig: Take 20 mg by mouth daily   Patient not taking: Reported on 8/25/2022   fluticasone-vilanterol (BREO ELLIPTA) 200-25 MCG/INH inhaler   Yes No   Sig: Inhale 1 puff daily   meclizine (ANTIVERT) 25 mg tablet   No No   Sig: Take 1 tablet (25 mg total) by mouth 3 (three) times a day as needed for dizziness   naproxen (NAPROSYN) 500 mg tablet   No No   Sig: Take 1 tablet (500 mg total) by mouth 2 (two) times a day with meals   Patient not taking: Reported on 8/25/2022   ondansetron (Zofran ODT) 4 mg disintegrating tablet   No No   Sig: Take 1 tablet (4 mg total) by mouth every 6 (six) hours as needed for nausea or vomiting   Patient not taking: Reported on 8/25/2022   predniSONE 10 mg tablet   Yes No   Sig: Take by mouth   Patient not taking: Reported on 8/25/2022   tiotropium (SPIRIVA RESPIMAT) 1 25 MCG/ACT AERS inhaler   Yes No   Sig: Inhale 2 5 mcg daily   Patient not taking: Reported on 8/25/2022   traZODone (DESYREL) 50 mg tablet   Yes No   Sig: Take 50 mg by mouth   triamcinolone (KENALOG) 0 1 % cream   No No   Sig: Apply topically 2 (two) times a day for 7 days      Facility-Administered Medications: None       Past Medical History:   Diagnosis Date    Asthma     Mitral valve prolapse        Past Surgical History:   Procedure Laterality Date    ROTATOR CUFF REPAIR Bilateral     2020 + 2017       History reviewed  No pertinent family history  I have reviewed and agree with the history as documented  E-Cigarette/Vaping    E-Cigarette Use Never User      E-Cigarette/Vaping Substances    Nicotine No     THC No     CBD No     Flavoring No     Other No     Unknown No      Social History     Tobacco Use    Smoking status: Never Smoker    Smokeless tobacco: Never Used   Vaping Use    Vaping Use: Never used   Substance Use Topics    Alcohol use: Not Currently     Comment: socially    Drug use: No        Review of Systems   Constitutional: Negative for chills and fever  HENT: Negative for sore throat  Respiratory: Positive for shortness of breath  Negative for cough  Cardiovascular: Positive for palpitations  Negative for chest pain and leg swelling  Gastrointestinal: Negative for abdominal pain, diarrhea, nausea and vomiting  Genitourinary: Negative for dysuria and frequency  Musculoskeletal: Negative for myalgias  Skin: Negative for rash and wound  Neurological: Positive for headaches  Negative for dizziness and light-headedness  All other systems reviewed and are negative        Physical Exam  ED Triage Vitals [09/16/22 1206]   Temperature Pulse Respirations Blood Pressure SpO2   98 2 °F (36 8 °C) 89 18 144/88 98 %      Temp Source Heart Rate Source Patient Position - Orthostatic VS BP Location FiO2 (%)   Oral Monitor Sitting Left arm --      Pain Score       8             Orthostatic Vital Signs  Vitals:    09/16/22 1206   BP: 144/88   Pulse: 89   Patient Position - Orthostatic VS: Sitting       Physical Exam  Vitals and nursing note reviewed  Constitutional:       General: He is not in acute distress  Appearance: Normal appearance  He is not ill-appearing or toxic-appearing  HENT:      Head: Normocephalic and atraumatic  Right Ear: External ear normal       Left Ear: External ear normal       Nose: Nose normal       Mouth/Throat:      Mouth: Mucous membranes are moist       Pharynx: Oropharynx is clear  Eyes:      General: No scleral icterus  Extraocular Movements: Extraocular movements intact  Cardiovascular:      Rate and Rhythm: Normal rate and regular rhythm  Pulses: Normal pulses  Pulmonary:      Effort: Pulmonary effort is normal  No respiratory distress  Breath sounds: Normal breath sounds  Abdominal:      Palpations: Abdomen is soft  Tenderness: There is no abdominal tenderness  Musculoskeletal:         General: Normal range of motion  Cervical back: Normal range of motion and neck supple  Skin:     General: Skin is warm  Capillary Refill: Capillary refill takes less than 2 seconds  Neurological:      General: No focal deficit present  Mental Status: He is alert and oriented to person, place, and time  ED Medications  Medications - No data to display    Diagnostic Studies  Results Reviewed     None                 No orders to display         Procedures  Procedures      ED Course  ED Course as of 09/16/22 1716   Fri Sep 16, 2022   1321 Per med student, woke up with HA, left sided and goes into neck  Feels better but has tingling in leg, palpitations and SOB  MDM  Number of Diagnoses or Management Options  Headache  Palpitations  Diagnosis management comments: 49-year-old male presenting for evaluation of headache and palpitations  Both of which have resolved  Suspect headache is a benign tension headache, will check EKG to assess for arrhythmia  EKG shows normal sinus rhythm, without arrhythmia, nonischemic  Patient is stable for discharge with primary care follow-up and return to ED precautions  I reviewed all testing with the patient: EKG  I gave oral return precautions for what to return for in addition to the written return precautions  The patient (and any family present: n/a) verbalized understanding of the discharge instructions and warnings that would necessitate return to the Emergency Department  I specifically highlighted areas of special concern regarding the written and verbal discharge instructions and return precautions  All questions were answered prior to discharge  Disposition  Final diagnoses:   Headache   Palpitations     Time reflects when diagnosis was documented in both MDM as applicable and the Disposition within this note     Time User Action Codes Description Comment    9/16/2022  2:53 PM Veryl Madeline [R51 9] Headache     9/16/2022  2:53 PM Tyra Romero Add [R00 2] Palpitations       ED Disposition     ED Disposition   Discharge    Condition   Stable    Date/Time   Fri Sep 16, 2022  3:03 PM    [address] [place] [address] discharge to home/self care                 Follow-up Information     Follow up With Specialties Details Why Contact Info Additional Information    Jerry Cabrera, [facility], Nurse Practitioner  For Emergency Department Follow-up [address] [place] [address] 97 11       [facility] [address] Emergency Department Emergency Medicine  If symptoms worsen [address] [phone]  [phone] [facility] Emergency Department, [address], [place], [place], [address]          Discharge Medication List as of 9/16/2022  3:03 PM      CONTINUE these medications which have NOT CHANGED    Details   acetaminophen (TYLENOL) 500 mg tablet Take 1 tablet (500 mg total) by mouth every 6 (six) hours as needed for mild pain or moderate pain, Starting Tue 7/30/2019, Print      !! albuterol (PROVENTIL HFA,VENTOLIN HFA) 90 mcg/act inhaler Inhale 1 puff every 6 (six) hours as needed, Starting Mon 3/11/2019, Historical Med      !! albuterol (PROVENTIL HFA,VENTOLIN HFA) 90 mcg/act inhaler Inhale 2 puffs every 4 (four) hours as needed for wheezing, Starting Sat 4/17/2021, Normal      baclofen 10 mg tablet Take 1 tablet (10 mg total) by mouth 3 (three) times a day for 4 days, Starting Wed 9/25/2019, Until Sun 9/29/2019, Print      citalopram (CeleXA) 20 mg tablet Take 20 mg by mouth daily, Historical Med      fluticasone-vilanterol (BREO ELLIPTA) 200-25 MCG/INH inhaler Inhale 1 puff daily, Starting Mon 8/20/2018, Historical Med      meclizine (ANTIVERT) 25 mg tablet Take 1 tablet (25 mg total) by mouth 3 (three) times a day as needed for dizziness, Starting Wed 6/15/2022, Normal      naproxen (NAPROSYN) 500 mg tablet Take 1 tablet (500 mg total) by mouth 2 (two) times a day with meals, Starting Wed 9/30/2020, Normal      ondansetron (Zofran ODT) 4 mg disintegrating tablet Take 1 tablet (4 mg total) by mouth every 6 (six) hours as needed for nausea or vomiting, Starting Wed 6/15/2022, Normal      predniSONE 10 mg tablet Take by mouth, Starting Wed 8/15/2012, Historical Med      tiotropium (SPIRIVA RESPIMAT) 1 25 MCG/ACT AERS inhaler Inhale 2 5 mcg daily, Starting Mon 8/20/2018, Historical Med      traZODone (DESYREL) 50 mg tablet Take 50 mg by mouth, Historical Med      triamcinolone (KENALOG) 0 1 % cream Apply topically 2 (two) times a day for 7 days, Starting Sun 10/10/2021, Until Sun 10/17/2021, Normal       !! - Potential duplicate medications found  Please discuss with provider  No discharge procedures on file  PDMP Review     None           ED Provider  Attending physically available and evaluated [facility]  I managed the patient along with the ED Attending      Electronically Signed by         Hola Pope DO  09/16/22 5186

## 2022-09-16 NOTE — DISCHARGE INSTRUCTIONS
Please use the following pain medications as prescribed:  - Tylenol 650mg every 6 hours  - Motrin 400mg every 6 hours  They work in different ways so can be used together at the same time

## 2022-11-23 ENCOUNTER — HOSPITAL ENCOUNTER (EMERGENCY)
Facility: HOSPITAL | Age: 42
Discharge: HOME | End: 2022-11-23
Attending: EMERGENCY MEDICINE

## 2022-11-23 VITALS
TEMPERATURE: 97.6 F | RESPIRATION RATE: 18 BRPM | SYSTOLIC BLOOD PRESSURE: 132 MMHG | DIASTOLIC BLOOD PRESSURE: 82 MMHG | OXYGEN SATURATION: 99 % | HEART RATE: 76 BPM

## 2022-11-23 DIAGNOSIS — R00.2 HEART PALPITATIONS: Primary | ICD-10-CM

## 2022-11-23 DIAGNOSIS — M25.579 ANKLE PAIN: ICD-10-CM

## 2022-11-23 LAB
ANION GAP SERPL CALCULATED.3IONS-SCNC: 5 MMOL/L (ref 4–13)
BASOPHILS # BLD AUTO: 0.05 THOUSANDS/ÂΜL (ref 0–0.1)
BASOPHILS NFR BLD AUTO: 1 % (ref 0–1)
BUN SERPL-MCNC: 11 MG/DL (ref 5–25)
CALCIUM SERPL-MCNC: 9.5 MG/DL (ref 8.3–10.1)
CARDIAC TROPONIN I PNL SERPL HS: 4 NG/L
CHLORIDE SERPL-SCNC: 106 MMOL/L (ref 96–108)
CO2 SERPL-SCNC: 27 MMOL/L (ref 21–32)
CREAT SERPL-MCNC: 0.85 MG/DL (ref 0.6–1.3)
EOSINOPHIL # BLD AUTO: 0.14 THOUSAND/ÂΜL (ref 0–0.61)
EOSINOPHIL NFR BLD AUTO: 2 % (ref 0–6)
ERYTHROCYTE [DISTWIDTH] IN BLOOD BY AUTOMATED COUNT: 11.9 % (ref 11.6–15.1)
GFR SERPL CREATININE-BSD FRML MDRD: 105 ML/MIN/1.73SQ M
GLUCOSE SERPL-MCNC: 109 MG/DL (ref 65–140)
HCT VFR BLD AUTO: 42.2 % (ref 36.5–49.3)
HGB BLD-MCNC: 14.5 G/DL (ref 12–17)
IMM GRANULOCYTES # BLD AUTO: 0.02 THOUSAND/UL (ref 0–0.2)
IMM GRANULOCYTES NFR BLD AUTO: 0 % (ref 0–2)
LYMPHOCYTES # BLD AUTO: 1.68 THOUSANDS/ÂΜL (ref 0.6–4.47)
LYMPHOCYTES NFR BLD AUTO: 26 % (ref 14–44)
MCH RBC QN AUTO: 31.8 PG (ref 26.8–34.3)
MCHC RBC AUTO-ENTMCNC: 34.4 G/DL (ref 31.4–37.4)
MCV RBC AUTO: 93 FL (ref 82–98)
MONOCYTES # BLD AUTO: 0.45 THOUSAND/ÂΜL (ref 0.17–1.22)
MONOCYTES NFR BLD AUTO: 7 % (ref 4–12)
NEUTROPHILS # BLD AUTO: 4.04 THOUSANDS/ÂΜL (ref 1.85–7.62)
NEUTS SEG NFR BLD AUTO: 64 % (ref 43–75)
NRBC BLD AUTO-RTO: 0 /100 WBCS
PLATELET # BLD AUTO: 300 THOUSANDS/UL (ref 149–390)
PMV BLD AUTO: 9.5 FL (ref 8.9–12.7)
POTASSIUM SERPL-SCNC: 3.7 MMOL/L (ref 3.5–5.3)
RBC # BLD AUTO: 4.56 MILLION/UL (ref 3.88–5.62)
SODIUM SERPL-SCNC: 138 MMOL/L (ref 135–147)
TSH SERPL DL<=0.05 MIU/L-ACNC: 0.79 UIU/ML (ref 0.45–4.5)
WBC # BLD AUTO: 6.38 THOUSAND/UL (ref 4.31–10.16)

## 2022-11-23 NOTE — DISCHARGE INSTRUCTIONS
You were seen in the Emergency Department today for heart palpitations  Please follow up with your primary care doctor in 2-3 days  Please return to the Emergency Department if you experience worsening of your current symptoms, chest pain, shortness of breath, or any other concerning symptoms

## 2022-11-23 NOTE — ED PROVIDER NOTES
History  Chief Complaint   Patient presents with   • Ankle Pain     Pt brought in by EMS w/ c/o R ankle pain while laying in bed this morning  Still able to bear weight  Pt also states feeling "palpations"  Patient is a 40-year-old male with significant past medical history of MVP who presented with her palpitations and ankle pain  Or palpitations began about an hour ago and will come up from his sleep  It lasted about 20 minutes and are associated with left chest tightness  He denies chest pain shortness a breath at the time  He did feel lightheaded during that time that has resolved  He no longer has the heart palpitations or tightness  The tightness did not radiate  He denies nausea vomiting  This has happened before but he states it is different because it is much faster he denies recent viral illness  In addition to this he also fell right medial ankle pain upon wakening  The pain shot up from the vitas ankle to the top of his ankle and lasted for a couple of minutes  He describes it as cramping feeling  He denies trauma to the area  He did not do anything to aggravate this area  He is able to ambulate  He does not tried Tylenol or Motrin for the pain  Prior to Admission Medications   Prescriptions Last Dose Informant Patient Reported?  Taking?   acetaminophen (TYLENOL) 500 mg tablet   No No   Sig: Take 1 tablet (500 mg total) by mouth every 6 (six) hours as needed for mild pain or moderate pain   Patient not taking: Reported on 8/25/2022   albuterol (PROVENTIL HFA,VENTOLIN HFA) 90 mcg/act inhaler   Yes No   Sig: Inhale 1 puff every 6 (six) hours as needed   albuterol (PROVENTIL HFA,VENTOLIN HFA) 90 mcg/act inhaler   No No   Sig: Inhale 2 puffs every 4 (four) hours as needed for wheezing   baclofen 10 mg tablet   No No   Sig: Take 1 tablet (10 mg total) by mouth 3 (three) times a day for 4 days   citalopram (CeleXA) 20 mg tablet   Yes No   Sig: Take 20 mg by mouth daily   Patient not taking: Reported on 8/25/2022   fluticasone-vilanterol (BREO ELLIPTA) 200-25 MCG/INH inhaler   Yes No   Sig: Inhale 1 puff daily   meclizine (ANTIVERT) 25 mg tablet   No No   Sig: Take 1 tablet (25 mg total) by mouth 3 (three) times a day as needed for dizziness   naproxen (NAPROSYN) 500 mg tablet   No No   Sig: Take 1 tablet (500 mg total) by mouth 2 (two) times a day with meals   Patient not taking: Reported on 8/25/2022   ondansetron (Zofran ODT) 4 mg disintegrating tablet   No No   Sig: Take 1 tablet (4 mg total) by mouth every 6 (six) hours as needed for nausea or vomiting   Patient not taking: Reported on 8/25/2022   predniSONE 10 mg tablet   Yes No   Sig: Take by mouth   Patient not taking: Reported on 8/25/2022   tiotropium (SPIRIVA RESPIMAT) 1 25 MCG/ACT AERS inhaler   Yes No   Sig: Inhale 2 5 mcg daily   Patient not taking: Reported on 8/25/2022   traZODone (DESYREL) 50 mg tablet   Yes No   Sig: Take 50 mg by mouth   triamcinolone (KENALOG) 0 1 % cream   No No   Sig: Apply topically 2 (two) times a day for 7 days      Facility-Administered Medications: None       Past Medical History:   Diagnosis Date   • Asthma    • Mitral valve prolapse        Past Surgical History:   Procedure Laterality Date   • ROTATOR CUFF REPAIR Bilateral     2020 + 2017       History reviewed  No pertinent family history  I have reviewed and agree with the history as documented  E-Cigarette/Vaping   • E-Cigarette Use Never User      E-Cigarette/Vaping Substances   • Nicotine No    • THC No    • CBD No    • Flavoring No    • Other No    • Unknown No      Social History     Tobacco Use   • Smoking status: Never   • Smokeless tobacco: Never   Vaping Use   • Vaping Use: Never used   Substance Use Topics   • Alcohol use: Not Currently     Comment: socially   • Drug use: No        Review of Systems   Constitutional: Negative for chills and fever  HENT: Negative for ear pain and sore throat      Eyes: Negative for pain and visual disturbance  Respiratory: Negative for cough and shortness of breath  Cardiovascular: Positive for palpitations  Negative for chest pain  Gastrointestinal: Negative for abdominal pain and vomiting  Genitourinary: Negative for dysuria and hematuria  Musculoskeletal: Negative for arthralgias and back pain  Skin: Negative for color change and rash  Neurological: Negative for seizures and syncope  All other systems reviewed and are negative  Physical Exam  ED Triage Vitals [11/23/22 0635]   Temperature Pulse Respirations Blood Pressure SpO2   97 6 °F (36 4 °C) 76 18 132/82 99 %      Temp Source Heart Rate Source Patient Position - Orthostatic VS BP Location FiO2 (%)   Tympanic Monitor Lying Right arm --      Pain Score       --             Orthostatic Vital Signs  Vitals:    11/23/22 0635   BP: 132/82   Pulse: 76   Patient Position - Orthostatic VS: Lying       Physical Exam  Vitals and nursing note reviewed  Constitutional:       General: He is not in acute distress  Appearance: He is well-developed  HENT:      Head: Normocephalic and atraumatic  Eyes:      Extraocular Movements: Extraocular movements intact  Conjunctiva/sclera: Conjunctivae normal    Cardiovascular:      Rate and Rhythm: Normal rate and regular rhythm  Heart sounds: No murmur heard  Pulmonary:      Effort: Pulmonary effort is normal  No respiratory distress  Breath sounds: Normal breath sounds  Abdominal:      Palpations: Abdomen is soft  Tenderness: There is no abdominal tenderness  Musculoskeletal:         General: No swelling  Cervical back: Neck supple  Comments: No tenderness to palpation over the right medial ankle  No ecchymosis, swelling, or redness over the area  Skin:     General: Skin is warm and dry  Capillary Refill: Capillary refill takes less than 2 seconds  Neurological:      Mental Status: He is alert  Sensory: No sensory deficit        Motor: No weakness  Psychiatric:         Mood and Affect: Mood normal          ED Medications  Medications - No data to display    Diagnostic Studies  Results Reviewed     Procedure Component Value Units Date/Time    Basic metabolic panel [[id number]] Collected: 11/23/22 0747    Lab Status: Final result Specimen: Blood from Arm, Right Updated: 11/23/22 6773     Sodium 138 mmol/L      Potassium 3 7 mmol/L      Chloride 106 mmol/L      CO2 27 mmol/L      ANION GAP 5 mmol/L      BUN 11 mg/dL      Creatinine 0 85 mg/dL      Glucose 109 mg/dL      Calcium 9 5 mg/dL      eGFR 105 ml/min/1 73sq m     Narrative:      [facility] guidelines for Chronic Kidney Disease (CKD):   •  Stage 1 with normal or high GFR (GFR > 90 mL/min/1 73 square meters)  •  Stage 2 Mild CKD (GFR = 60-89 mL/min/1 73 square meters)  •  Stage 3A Moderate CKD (GFR = 45-59 mL/min/1 73 square meters)  •  Stage 3B Moderate CKD (GFR = 30-44 mL/min/1 73 square meters)  •  Stage 4 Severe CKD (GFR = 15-29 mL/min/1 73 square meters)  •  Stage 5 End Stage CKD (GFR <15 mL/min/1 73 square meters)  Note: GFR calculation is accurate only with a steady state creatinine    TSH [[id number]]  (Normal) Collected: 11/23/22 0747    Lab Status: Final result Specimen: Blood from Arm, Right Updated: 11/23/22 0822     TSH 3RD GENERATON 0 785 uIU/mL     Narrative:      Patients undergoing fluorescein dye angiography may retain small amounts of fluorescein in the body for 48-72 hours post procedure  Samples containing fluorescein can produce falsely depressed TSH values  If the patient had this procedure,a specimen should be resubmitted post fluorescein clearance        HS Troponin 0hr (reflex protocol) [[id number]]  (Normal) Collected: 11/23/22 0747    Lab Status: Final result Specimen: Blood from Arm, Right Updated: 11/23/22 0817     hs TnI 0hr 4 ng/L     CBC and differential [[id number]] Collected: 11/23/22 0747    Lab Status: Final result Specimen: Blood from Arm, Right Updated: 11/23/22 0754     WBC 6 38 Thousand/uL      RBC 4 56 Million/uL      Hemoglobin 14 5 g/dL      Hematocrit 42 2 %      MCV 93 fL      MCH 31 8 pg      MCHC 34 4 g/dL      RDW 11 9 %      MPV 9 5 fL      Platelets 513 Thousands/uL      nRBC 0 /100 WBCs      Neutrophils Relative 64 %      Immat GRANS % 0 %      Lymphocytes Relative 26 %      Monocytes Relative 7 %      Eosinophils Relative 2 %      Basophils Relative 1 %      Neutrophils Absolute 4 04 Thousands/µL      Immature Grans Absolute 0 02 Thousand/uL      Lymphocytes Absolute 1 68 Thousands/µL      Monocytes Absolute 0 45 Thousand/µL      Eosinophils Absolute 0 14 Thousand/µL      Basophils Absolute 0 05 Thousands/µL                  No orders to display         Procedures  ECG 12 Lead Documentation Only    Date/Time: 11/23/2022 4:28 PM  Performed by: Sharry Sacks, MD  Authorized by: Sharry Sacks, MD     ECG reviewed by me, the ED Provider: yes    Patient location:  ED  Previous ECG:     Previous ECG:  Compared to current  Interpretation:     Interpretation: normal    Rate:     ECG rate:  77    ECG rate assessment: normal    Rhythm:     Rhythm: sinus rhythm    Ectopy:     Ectopy: none    QRS:     QRS axis:  Normal  Conduction:     Conduction: normal    ST segments:     ST segments:  Normal  T waves:     T waves: inverted      Inverted:  III and aVF  Other findings:     Other findings: LVH            ED Course  ED Course as of 11/23/22 1638   Wed Nov 23, 2022   0822 hs TnI 0hr: 4   0911 TSH 3RD GENERATON: 0 785             HEART Risk Score    Flowsheet Row Most Recent Value   Heart Score Risk Calculator    History 0 Filed at: 11/23/2022 1629   ECG 0 Filed at: 11/23/2022 1629   Age 0 Filed at: 11/23/2022 1629   Risk Factors 0 Filed at: 11/23/2022 1629   Troponin 0 Filed at: 11/23/2022 1629   HEART Score 0 Filed at: 11/23/2022 1629                                MDM  Number of Diagnoses or Management Options  Ankle pain  Heart palpitations  Diagnosis management comments: Patient is a 57-year-old male with significant past medical history of MVP who presented with palpitations and ankle pain  The patient is able to ambulate and he does not tenderness to palpation over his ankle  He is denying pain at the time  X-rays not indicated at this time  Given the patient's chest tightness and heart palpitations cardiac workup done including EKG, CBC and BMP, and troponins  TSH will be ordered due to his heart palpitations  Patient will be re-evaluated periodically  Patient's lab results and EKG were not concerning  Patient reports that he has not had any more heart palpitations since he has been in the ED  He was told to follow-up with his PCP and was given return precautions  Disposition  Final diagnoses:   Heart palpitations   Ankle pain     Time reflects when diagnosis was documented in both MDM as applicable and the Disposition within this note     Time User Action Codes Description Comment    11/23/2022  9:13 AM Mary Conception Add [R00 2] Heart palpitations     11/23/2022  9:13 AM Mary Conception Add [M25 579] Ankle pain       ED Disposition     ED Disposition   Discharge    Condition   Stable    Date/Time   Wed Nov 23, 2022  [id number] [facility] discharge to home/self care                 Follow-up Information     Follow up With Specialties Details Why Contact Info Additional [address] [facility]   [address] [facility]  [address] [facility] [address]  [facility] [address], [address], [address] [phone]          Discharge Medication List as of 11/23/2022  9:14 AM      CONTINUE these medications which have NOT CHANGED    Details   acetaminophen (TYLENOL) 500 mg tablet Take 1 tablet (500 mg total) by mouth every 6 (six) hours as needed for mild pain or moderate pain, Starting Tue 7/30/2019, Print      !! albuterol (PROVENTIL HFA,VENTOLIN HFA) 90 mcg/act inhaler Inhale 1 puff every 6 (six) hours as needed, Starting Mon 3/11/2019, Historical Med      !! albuterol (PROVENTIL HFA,VENTOLIN HFA) 90 mcg/act inhaler Inhale 2 puffs every 4 (four) hours as needed for wheezing, Starting Sat 4/17/2021, Normal      baclofen 10 mg tablet Take 1 tablet (10 mg total) by mouth 3 (three) times a day for 4 days, Starting Wed 9/25/2019, Until Sun 9/29/2019, Print      citalopram (CeleXA) 20 mg tablet Take 20 mg by mouth daily, Historical Med      fluticasone-vilanterol (BREO ELLIPTA) 200-25 MCG/INH inhaler Inhale 1 puff daily, Starting Mon 8/20/2018, Historical Med      meclizine (ANTIVERT) 25 mg tablet Take 1 tablet (25 mg total) by mouth 3 (three) times a day as needed for dizziness, Starting Wed 6/15/2022, Normal      naproxen (NAPROSYN) 500 mg tablet Take 1 tablet (500 mg total) by mouth 2 (two) times a day with meals, Starting Wed 9/30/2020, Normal      ondansetron (Zofran ODT) 4 mg disintegrating tablet Take 1 tablet (4 mg total) by mouth every 6 (six) hours as needed for nausea or vomiting, Starting Wed 6/15/2022, Normal      predniSONE 10 mg tablet Take by mouth, Starting Wed 8/15/2012, Historical Med      tiotropium (SPIRIVA RESPIMAT) 1 25 MCG/ACT AERS inhaler Inhale 2 5 mcg daily, Starting Mon 8/20/2018, Historical Med      traZODone (DESYREL) 50 mg tablet Take 50 mg by mouth, Historical Med      triamcinolone (KENALOG) 0 1 % cream Apply topically 2 (two) times a day for 7 days, Starting Sun 10/10/2021, Until Sun 10/17/2021, Normal       !! - Potential duplicate medications found  Please discuss with provider  No discharge procedures on file  PDMP Review     None           ED Provider  Attending physically available and evaluated [facility]  I managed the patient along with the ED Attending      Electronically Signed by         Bridgett Eastman MD  11/23/22 9079

## 2022-11-27 NOTE — ED ATTENDING ATTESTATION
11/23/2022  IAdelina MD, saw and evaluated the patient  I have discussed the patient with the resident/non-physician practitioner and agree with the resident's/non-physician practitioner's findings, Plan of Care, and MDM as documented in the resident's/non-physician practitioner's note, except where noted  All available labs and Radiology studies were reviewed  I was present for key portions of any procedure(s) performed by the resident/non-physician practitioner and I was immediately available to provide assistance  At this point I agree with the current assessment done in the Emergency Department  I have conducted an independent evaluation of this patient a history and physical is as follows:    ED Course     33-year-old male presents with palpitations and right ankle pain patient states palpitations woke up from rest this morning in bed  Sensation is heart pounding and racing  Concurrently with palpitations patient noted the head ankle pain difficulty bending his ankle he denies any injuries fevers chills rash  Recent travel  Vitals reviewed Heart regular rate rhythm without murmurs  Lungs clear bilaterally abdomen soft nontender nondistended normal bowel sounds  Extremities no edema  Examination of right ankle there is no bony point tenderness full range of motion active and passive no area of erythema ankle is cool to touch Normal pulses    Impression:  Palpitations will check screening labs ECG despite discharge with outpatient follow-up        Critical Care Time  Procedures

## 2022-12-01 ENCOUNTER — APPOINTMENT (OUTPATIENT)
Dept: LAB | Facility: CLINIC | Age: 42
End: 2022-12-01

## 2022-12-01 DIAGNOSIS — Z11.59 NEED FOR HEPATITIS C SCREENING TEST: ICD-10-CM

## 2022-12-01 DIAGNOSIS — R42 VERTIGO: ICD-10-CM

## 2022-12-01 DIAGNOSIS — Z13.6 SCREENING FOR CARDIOVASCULAR CONDITION: ICD-10-CM

## 2022-12-01 LAB
ALBUMIN SERPL BCP-MCNC: 4.7 G/DL (ref 3.5–5)
ALP SERPL-CCNC: 77 U/L (ref 46–116)
ALT SERPL W P-5'-P-CCNC: 17 U/L (ref 12–78)
ANION GAP SERPL CALCULATED.3IONS-SCNC: 3 MMOL/L (ref 4–13)
AST SERPL W P-5'-P-CCNC: 9 U/L (ref 5–45)
BILIRUB SERPL-MCNC: 2.33 MG/DL (ref 0.2–1)
BUN SERPL-MCNC: 10 MG/DL (ref 5–25)
CALCIUM SERPL-MCNC: 9.6 MG/DL (ref 8.3–10.1)
CHLORIDE SERPL-SCNC: 106 MMOL/L (ref 96–108)
CHOLEST SERPL-MCNC: 178 MG/DL
CO2 SERPL-SCNC: 30 MMOL/L (ref 21–32)
CREAT SERPL-MCNC: 0.92 MG/DL (ref 0.6–1.3)
GFR SERPL CREATININE-BSD FRML MDRD: 100 ML/MIN/1.73SQ M
GLUCOSE P FAST SERPL-MCNC: 88 MG/DL (ref 65–99)
HCV AB SER QL: NORMAL
HDLC SERPL-MCNC: 33 MG/DL
LDLC SERPL CALC-MCNC: 128 MG/DL (ref 0–100)
NONHDLC SERPL-MCNC: 145 MG/DL
POTASSIUM SERPL-SCNC: 3.6 MMOL/L (ref 3.5–5.3)
PROT SERPL-MCNC: 7.4 G/DL (ref 6.4–8.4)
SODIUM SERPL-SCNC: 139 MMOL/L (ref 135–147)
TRIGL SERPL-MCNC: 87 MG/DL

## 2022-12-10 ENCOUNTER — HOSPITAL ENCOUNTER (EMERGENCY)
Facility: HOSPITAL | Age: 42
Discharge: HOME | End: 2022-12-10
Attending: EMERGENCY MEDICINE

## 2022-12-10 VITALS
RESPIRATION RATE: 18 BRPM | OXYGEN SATURATION: 100 % | DIASTOLIC BLOOD PRESSURE: 96 MMHG | SYSTOLIC BLOOD PRESSURE: 160 MMHG | TEMPERATURE: 99.5 F | HEART RATE: 114 BPM

## 2022-12-10 DIAGNOSIS — M62.838 SPASM OF CERVICAL PARASPINOUS MUSCLE: Primary | ICD-10-CM

## 2022-12-10 DIAGNOSIS — L03.221 CELLULITIS OF NECK: ICD-10-CM

## 2022-12-10 RX ORDER — KETOROLAC TROMETHAMINE 30 MG/ML
15 INJECTION, SOLUTION INTRAMUSCULAR; INTRAVENOUS ONCE
Status: COMPLETED | OUTPATIENT
Start: 2022-12-10 | End: 2022-12-10

## 2022-12-10 RX ORDER — DOXYCYCLINE HYCLATE 100 MG/1
100 CAPSULE ORAL 2 TIMES DAILY
Qty: 10 CAPSULE | Refills: 0 | Status: SHIPPED | OUTPATIENT
Start: 2022-12-10 | End: 2022-12-15

## 2022-12-10 RX ORDER — METHOCARBAMOL 500 MG/1
500 TABLET, FILM COATED ORAL 2 TIMES DAILY PRN
Qty: 10 TABLET | Refills: 0 | Status: SHIPPED | OUTPATIENT
Start: 2022-12-10 | End: 2022-12-15

## 2022-12-10 RX ORDER — ACETAMINOPHEN 325 MG/1
975 TABLET ORAL ONCE
Status: COMPLETED | OUTPATIENT
Start: 2022-12-10 | End: 2022-12-10

## 2022-12-10 RX ORDER — METHOCARBAMOL 500 MG/1
500 TABLET, FILM COATED ORAL ONCE
Status: COMPLETED | OUTPATIENT
Start: 2022-12-10 | End: 2022-12-10

## 2022-12-10 RX ORDER — DOXYCYCLINE HYCLATE 100 MG/1
100 CAPSULE ORAL ONCE
Status: COMPLETED | OUTPATIENT
Start: 2022-12-10 | End: 2022-12-10

## 2022-12-10 RX ADMIN — KETOROLAC TROMETHAMINE 15 MG: 30 INJECTION, SOLUTION INTRAMUSCULAR; INTRAVENOUS at 20:06

## 2022-12-10 RX ADMIN — DOXYCYCLINE 100 MG: 100 CAPSULE ORAL at 20:56

## 2022-12-10 RX ADMIN — ACETAMINOPHEN 975 MG: 325 TABLET ORAL at 20:05

## 2022-12-10 RX ADMIN — METHOCARBAMOL 500 MG: 500 TABLET ORAL at 20:05

## 2022-12-11 NOTE — ED PROVIDER NOTES
History  Chief Complaint   Patient presents with   • Neck Swelling     Pt reports pain in posterior neck  Red, raised area noted to posterior neck     HPI    Prior to Admission Medications   Prescriptions Last Dose Informant Patient Reported?  Taking?   acetaminophen (TYLENOL) 500 mg tablet   No No   Sig: Take 1 tablet (500 mg total) by mouth every 6 (six) hours as needed for mild pain or moderate pain   Patient not taking: Reported on 8/25/2022   albuterol (PROVENTIL HFA,VENTOLIN HFA) 90 mcg/act inhaler   Yes No   Sig: Inhale 1 puff every 6 (six) hours as needed   albuterol (PROVENTIL HFA,VENTOLIN HFA) 90 mcg/act inhaler   No No   Sig: Inhale 2 puffs every 4 (four) hours as needed for wheezing   baclofen 10 mg tablet   No No   Sig: Take 1 tablet (10 mg total) by mouth 3 (three) times a day for 4 days   citalopram (CeleXA) 20 mg tablet   Yes No   Sig: Take 20 mg by mouth daily   Patient not taking: Reported on 8/25/2022   fluticasone-vilanterol (BREO ELLIPTA) 200-25 MCG/INH inhaler   Yes No   Sig: Inhale 1 puff daily   meclizine (ANTIVERT) 25 mg tablet   No No   Sig: Take 1 tablet (25 mg total) by mouth 3 (three) times a day as needed for dizziness   naproxen (NAPROSYN) 500 mg tablet   No No   Sig: Take 1 tablet (500 mg total) by mouth 2 (two) times a day with meals   Patient not taking: Reported on 8/25/2022   ondansetron (Zofran ODT) 4 mg disintegrating tablet   No No   Sig: Take 1 tablet (4 mg total) by mouth every 6 (six) hours as needed for nausea or vomiting   Patient not taking: Reported on 8/25/2022   predniSONE 10 mg tablet   Yes No   Sig: Take by mouth   Patient not taking: Reported on 8/25/2022   tiotropium (SPIRIVA RESPIMAT) 1 25 MCG/ACT AERS inhaler   Yes No   Sig: Inhale 2 5 mcg daily   Patient not taking: Reported on 8/25/2022   traZODone (DESYREL) 50 mg tablet   Yes No   Sig: Take 50 mg by mouth   triamcinolone (KENALOG) 0 1 % cream   No No   Sig: Apply topically 2 (two) times a day for 7 days Facility-Administered Medications: None       Past Medical History:   Diagnosis Date   • Asthma    • Mitral valve prolapse        Past Surgical History:   Procedure Laterality Date   • ROTATOR CUFF REPAIR Bilateral     2020 + 2017       History reviewed  No pertinent family history  I have reviewed and agree with the history as documented  E-Cigarette/Vaping   • E-Cigarette Use Never User      E-Cigarette/Vaping Substances   • Nicotine No    • THC No    • CBD No    • Flavoring No    • Other No    • Unknown No      Social History     Tobacco Use   • Smoking status: Never   • Smokeless tobacco: Never   Vaping Use   • Vaping Use: Never used   Substance Use Topics   • Alcohol use: Not Currently     Comment: socially   • Drug use: No        Review of Systems   Constitutional: Negative for chills and fever  HENT: Negative  Respiratory: Negative  Cardiovascular: Negative  Gastrointestinal: Negative  Genitourinary: Negative  Musculoskeletal: Positive for neck pain  Skin: Positive for color change  Neurological: Positive for headaches  Negative for weakness and numbness  All other systems reviewed and are negative  Physical Exam  ED Triage Vitals [12/10/22 1857]   Temperature Pulse Respirations Blood Pressure SpO2   99 5 °F (37 5 °C) (!) 114 18 160/96 100 %      Temp Source Heart Rate Source Patient Position - Orthostatic VS BP Location FiO2 (%)   Oral Monitor -- Left arm --      Pain Score       --             Orthostatic Vital Signs  Vitals:    12/10/22 1857   BP: 160/96   Pulse: (!) 114       Physical Exam  Vitals and nursing note reviewed  Constitutional:       General: He is awake  He is not in acute distress  Appearance: He is not toxic-appearing  HENT:      Head: Normocephalic and atraumatic  Eyes:      General: Vision grossly intact  Gaze aligned appropriately  Neck:     Cardiovascular:      Rate and Rhythm: Normal rate and regular rhythm     Pulmonary:      Effort: Pulmonary effort is normal  No respiratory distress  Musculoskeletal:      Cervical back: Full passive range of motion without pain and neck supple  Skin:     General: Skin is warm and dry  Neurological:      General: No focal deficit present  Mental Status: He is alert and oriented to person, place, and time  ED Medications  Medications - No data to display    Diagnostic Studies  Results Reviewed     None                 No orders to display         Procedures  Procedures      ED Course                             SBIRT 22yo+    Flowsheet Row Most Recent Value   SBIRT (25 yo +)    In order to provide better care to our patients, we are screening all of our patients for alcohol and drug use  Would it be okay to ask you these screening questions? No Filed at: 12/10/2022 1943                MDM    Disposition  Final diagnoses:   None     ED Disposition     None      Follow-up Information    None         Patient's Medications   Discharge Prescriptions    No medications on file     No discharge procedures on file  PDMP Review     None           ED Provider  Attending physically available and evaluated [facility]  I managed the patient along with the ED Attending      Electronically Signed by Management Options  Cellulitis of neck: new and does not require workup  Spasm of cervical paraspinous muscle: new and does not require workup  Diagnosis management comments: 73-year-old male presents for evaluation of left-sided neck pain that started this morning  On exam, patient with area of redness on the posterior aspect of the left side of his neck  Area is slightly raised, warm to the touch  Area is also tender to palpation  Suspect that patient's pain is secondary to musculoskeletal discomfort/spasm  Also concern for overlying cellulitis in this area  Patient treated symptomatically with Tylenol, Toradol, and Robaxin  Patient given dose of doxycycline for prophylactic coverage of cellulitis  On reevaluation, patient states that his neck pain and headache have improved  Patient given prescriptions for Robaxin and doxycycline  Patient discharged home in stable condition with symptomatic care instructions and strict ED return precautions  Disposition  Final diagnoses:   Spasm of cervical paraspinous muscle   Cellulitis of neck     Time reflects when diagnosis was documented in both MDM as applicable and the Disposition within this note     Time User Action Codes Description Comment    12/10/2022  8:37 PM Abdi Desir Add [T16 507] Spasm of cervical paraspinous muscle     12/10/2022  8:37 PM Abdi Desir Add [L03 221] Cellulitis of neck       ED Disposition     ED Disposition   Discharge    Condition   Stable    Date/Time   Sat Dec 10, 2022  8:37 PM    [address] [place] [address] discharge to home/self care                 Follow-up Information     Follow up With Specialties Details Why Contact Info Additional Information    Rocael Rust, [facility], Nurse Practitioner Schedule an appointment as soon as possible for a visit in 1 week As needed [facility] [address] Emergency Department Emergency Medicine Go to  If symptoms worsen [address] [phone]  [address] [address] [address] Emergency Department, [address], [place], [place], [address]          Discharge Medication List as of 12/10/2022  8:48 PM      START taking these medications    Details   doxycycline hyclate (VIBRAMYCIN) 100 mg capsule Take 1 capsule (100 mg total) by mouth 2 (two) times a day for 5 days, Starting Sat 12/10/2022, Until Thu 12/15/2022, Normal      methocarbamol (ROBAXIN) 500 mg tablet Take 1 tablet (500 mg total) by mouth 2 (two) times a day as needed for muscle spasms for up to 5 days, Starting Sat 12/10/2022, Until Thu 12/15/2022 at 2359, Normal         CONTINUE these medications which have NOT CHANGED    Details   acetaminophen (TYLENOL) 500 mg tablet Take 1 tablet (500 mg total) by mouth every 6 (six) hours as needed for mild pain or moderate pain, Starting Tue 7/30/2019, Print      !! albuterol (PROVENTIL HFA,VENTOLIN HFA) 90 mcg/act inhaler Inhale 1 puff every 6 (six) hours as needed, Starting Mon 3/11/2019, Historical Med      !! albuterol (PROVENTIL HFA,VENTOLIN HFA) 90 mcg/act inhaler Inhale 2 puffs every 4 (four) hours as needed for wheezing, Starting Sat 4/17/2021, Normal      baclofen 10 mg tablet Take 1 tablet (10 mg total) by mouth 3 (three) times a day for 4 days, Starting Wed 9/25/2019, Until Sun 9/29/2019, Print      citalopram (CeleXA) 20 mg tablet Take 20 mg by mouth daily, Historical Med      fluticasone-vilanterol (BREO ELLIPTA) 200-25 MCG/INH inhaler Inhale 1 puff daily, Starting Mon 8/20/2018, Historical Med      meclizine (ANTIVERT) 25 mg tablet Take 1 tablet (25 mg total) by mouth 3 (three) times a day as needed for dizziness, Starting Wed 6/15/2022, Normal      naproxen (NAPROSYN) 500 mg tablet Take 1 tablet (500 mg total) by mouth 2 (two) times a day with meals, Starting Wed 9/30/2020, Normal      ondansetron (Zofran ODT) 4 mg disintegrating tablet Take 1 tablet (4 mg total) by mouth every 6 (six) hours as needed for nausea or vomiting, Starting Wed 6/15/2022, Normal      predniSONE 10 mg tablet Take by mouth, Starting Wed 8/15/2012, Historical Med      tiotropium (SPIRIVA RESPIMAT) 1 25 MCG/ACT AERS inhaler Inhale 2 5 mcg daily, Starting Mon 8/20/2018, Historical Med      traZODone (DESYREL) 50 mg tablet Take 50 mg by mouth, Historical Med      triamcinolone (KENALOG) 0 1 % cream Apply topically 2 (two) times a day for 7 days, Starting Sun 10/10/2021, Until Sun 10/17/2021, Normal       !! - Potential duplicate medications found  Please discuss with provider  No discharge procedures on file  PDMP Review     None           ED Provider  Attending physically available and evaluated [facility]  I managed the patient along with the ED Attending      Electronically Signed by         Army Mesfin DO  12/30/22 4511

## 2022-12-11 NOTE — DISCHARGE INSTRUCTIONS
You may take tylenol and ibuprofen and use heat as needed for your neck pain  Please take antibiotics as prescribed  Return to the emergency department for any new or worsening symptoms including fevers, spreading redness, worsening pain, numbness or tingling in your arms, worsening headache, or other concerning symptoms

## 2022-12-12 NOTE — ED ATTENDING ATTESTATION
12/10/2022  I, Ana Paula Martin MD, saw and evaluated the patient  I have discussed the patient with the resident/non-physician practitioner and agree with the resident's/non-physician practitioner's findings, Plan of Care, and MDM as documented in the resident's/non-physician practitioner's note, except where noted  All available labs and Radiology studies were reviewed  I was present for key portions of any procedure(s) performed by the resident/non-physician practitioner and I was immediately available to provide assistance  At this point I agree with the current assessment done in the Emergency Department  I have conducted an independent evaluation of this patient a history and physical is as follows:    ED Course   Patient is a 15-year-old male who presents with left sided neck redness and neck pain x1 day  Patient awoke from rest this morning noted that his neck "cracked and cracked had immediate pain and spasm of neck  Patient denies any direct trauma denies any fevers or chills nausea or vomiting  Patient tried to massage the neck with minimal improvement  Additionally, patient noted there was a small patch of area of redness and induration of posterior neck just below the hairline  He presents to the ED for evaluation  Vital signs reviewed patient is well-appearing nontoxic no acute distress patient has trapezius spasm bilaterally of neck  There are no focal motor deficits  There is no spine pain or tenderness  There is a 2 x 3 area of erythema and redness just below the hairline on the left occiput with mild induration which appears consistent with a cellulitis  Impression #1 neck pain likely due to musculoskeletal cause  Patient has no focal neurologic deficits prescribed NSAIDs muscle relaxants    #2 left neck cellulitis will prescribe short course of antibiotics follow-up with PCP as outpatient      Critical Care Time  Procedures

## 2022-12-21 ENCOUNTER — HOSPITAL ENCOUNTER (EMERGENCY)
Facility: HOSPITAL | Age: 42
Discharge: HOME | End: 2022-12-21
Attending: EMERGENCY MEDICINE

## 2022-12-21 VITALS
DIASTOLIC BLOOD PRESSURE: 73 MMHG | SYSTOLIC BLOOD PRESSURE: 123 MMHG | TEMPERATURE: 100.6 F | RESPIRATION RATE: 20 BRPM | OXYGEN SATURATION: 97 % | HEART RATE: 109 BPM

## 2022-12-21 DIAGNOSIS — B34.9 PHARYNGITIS WITH VIRAL SYNDROME: Primary | ICD-10-CM

## 2022-12-21 DIAGNOSIS — J11.1 INFLUENZA: ICD-10-CM

## 2022-12-21 DIAGNOSIS — R50.9 FEVER: ICD-10-CM

## 2022-12-21 DIAGNOSIS — J02.9 PHARYNGITIS WITH VIRAL SYNDROME: Primary | ICD-10-CM

## 2022-12-21 LAB
FLUAV RNA RESP QL NAA+PROBE: POSITIVE
FLUBV RNA RESP QL NAA+PROBE: NEGATIVE
RSV RNA RESP QL NAA+PROBE: NEGATIVE
SARS-COV-2 RNA RESP QL NAA+PROBE: NEGATIVE

## 2022-12-21 RX ORDER — ACETAMINOPHEN 325 MG/1
650 TABLET ORAL ONCE
Status: COMPLETED | OUTPATIENT
Start: 2022-12-21 | End: 2022-12-21

## 2022-12-21 RX ORDER — MAGNESIUM HYDROXIDE/ALUMINUM HYDROXICE/SIMETHICONE 120; 1200; 1200 MG/30ML; MG/30ML; MG/30ML
30 SUSPENSION ORAL ONCE
Status: COMPLETED | OUTPATIENT
Start: 2022-12-21 | End: 2022-12-21

## 2022-12-21 RX ORDER — SUCRALFATE 1 G/1
1 TABLET ORAL ONCE
Status: COMPLETED | OUTPATIENT
Start: 2022-12-21 | End: 2022-12-21

## 2022-12-21 RX ADMIN — SUCRALFATE 1 G: 1 TABLET ORAL at 19:01

## 2022-12-21 RX ADMIN — ACETAMINOPHEN 650 MG: 325 TABLET, FILM COATED ORAL at 19:01

## 2022-12-21 RX ADMIN — ALUMINUM HYDROXIDE, MAGNESIUM HYDROXIDE, AND SIMETHICONE 30 ML: 200; 200; 20 SUSPENSION ORAL at 19:01

## 2022-12-21 NOTE — ED PROVIDER NOTES
History  Chief Complaint   Patient presents with   • Fever - 9 weeks to 74 years     Pt reports sore throat, ha, gen body aches, and fevers since yesterday  Pt also reports abd pain       47-year-old male with history of mitral valve prolapse, GERD, and asthma presents with diffuse myalgias, odynophagia, nonproductive cough, fevers, headache, and epigastric pain since yesterday  Epigastric pain described as nonradiating, mild, burning, and feels similar to his baseline GERD pain  Denies sick contacts, chest pain, dyspnea, nausea, vomiting, diarrhea, back pain, or rashes  Headache diffuse, gradual onset, and mild to moderate  Prior to Admission Medications   Prescriptions Last Dose Informant Patient Reported?  Taking?   acetaminophen (TYLENOL) 500 mg tablet   No No   Sig: Take 1 tablet (500 mg total) by mouth every 6 (six) hours as needed for mild pain or moderate pain   Patient not taking: Reported on 8/25/2022   albuterol (PROVENTIL HFA,VENTOLIN HFA) 90 mcg/act inhaler   Yes No   Sig: Inhale 1 puff every 6 (six) hours as needed   albuterol (PROVENTIL HFA,VENTOLIN HFA) 90 mcg/act inhaler   No No   Sig: Inhale 2 puffs every 4 (four) hours as needed for wheezing   baclofen 10 mg tablet   No No   Sig: Take 1 tablet (10 mg total) by mouth 3 (three) times a day for 4 days   citalopram (CeleXA) 20 mg tablet   Yes No   Sig: Take 20 mg by mouth daily   Patient not taking: Reported on 8/25/2022   fluticasone-vilanterol (BREO ELLIPTA) 200-25 MCG/INH inhaler   Yes No   Sig: Inhale 1 puff daily   meclizine (ANTIVERT) 25 mg tablet   No No   Sig: Take 1 tablet (25 mg total) by mouth 3 (three) times a day as needed for dizziness   methocarbamol (ROBAXIN) 500 mg tablet   No No   Sig: Take 1 tablet (500 mg total) by mouth 2 (two) times a day as needed for muscle spasms for up to 5 days   naproxen (NAPROSYN) 500 mg tablet   No No   Sig: Take 1 tablet (500 mg total) by mouth 2 (two) times a day with meals   Patient not taking: Reported on 8/25/2022   ondansetron (Zofran ODT) 4 mg disintegrating tablet   No No   Sig: Take 1 tablet (4 mg total) by mouth every 6 (six) hours as needed for nausea or vomiting   Patient not taking: Reported on 8/25/2022   predniSONE 10 mg tablet   Yes No   Sig: Take by mouth   Patient not taking: Reported on 8/25/2022   tiotropium (SPIRIVA RESPIMAT) 1 25 MCG/ACT AERS inhaler   Yes No   Sig: Inhale 2 5 mcg daily   Patient not taking: Reported on 8/25/2022   traZODone (DESYREL) 50 mg tablet   Yes No   Sig: Take 50 mg by mouth   triamcinolone (KENALOG) 0 1 % cream   No No   Sig: Apply topically 2 (two) times a day for 7 days      Facility-Administered Medications: None       Past Medical History:   Diagnosis Date   • Asthma    • Mitral valve prolapse        Past Surgical History:   Procedure Laterality Date   • ROTATOR CUFF REPAIR Bilateral     2020 + 2017       No family history on file  I have reviewed and agree with the history as documented  E-Cigarette/Vaping   • E-Cigarette Use Never User      E-Cigarette/Vaping Substances   • Nicotine No    • THC No    • CBD No    • Flavoring No    • Other No    • Unknown No      Social History     Tobacco Use   • Smoking status: Never   • Smokeless tobacco: Never   Vaping Use   • Vaping Use: Never used   Substance Use Topics   • Alcohol use: Not Currently     Comment: socially   • Drug use: No        Review of Systems   Constitutional: Positive for fever  Negative for chills  HENT: Positive for sore throat  Negative for ear pain  Eyes: Negative for pain and visual disturbance  Respiratory: Positive for cough  Negative for shortness of breath and wheezing  Cardiovascular: Negative for chest pain and palpitations  Gastrointestinal: Positive for abdominal pain  Negative for vomiting  Genitourinary: Negative for dysuria and hematuria  Musculoskeletal: Negative for arthralgias and back pain  Skin: Negative for color change and rash     Neurological: Negative for seizures and syncope  All other systems reviewed and are negative  Physical Exam  ED Triage Vitals   Temperature Pulse Respirations Blood Pressure SpO2   12/21/22 1823 12/21/22 1821 12/21/22 1821 12/21/22 1821 12/21/22 1821   (!) 100 6 °F (38 1 °C) (!) 109 20 123/73 97 %      Temp Source Heart Rate Source Patient Position - Orthostatic VS BP Location FiO2 (%)   12/21/22 1821 12/21/22 1821 12/21/22 1821 12/21/22 1821 --   Oral Monitor Sitting Left arm       Pain Score       12/21/22 1821       10 - Worst Possible Pain             Orthostatic Vital Signs  Vitals:    12/21/22 1821   BP: 123/73   Pulse: (!) 109   Patient Position - Orthostatic VS: Sitting       Physical Exam  Vitals and nursing note reviewed  Constitutional:       General: He is not in acute distress  Appearance: Normal appearance  He is well-developed and normal weight  He is ill-appearing  He is not toxic-appearing or diaphoretic  HENT:      Head: Normocephalic and atraumatic  Right Ear: External ear normal       Left Ear: External ear normal       Nose: Nose normal       Mouth/Throat:      Mouth: Mucous membranes are moist       Pharynx: Posterior oropharyngeal erythema present  No oropharyngeal exudate  Eyes:      General:         Right eye: No discharge  Left eye: No discharge  Cardiovascular:      Rate and Rhythm: Regular rhythm  Tachycardia present  Heart sounds: Murmur heard  No friction rub  Pulmonary:      Effort: Pulmonary effort is normal  No respiratory distress  Breath sounds: Normal breath sounds  No stridor  No wheezing, rhonchi or rales  Chest:      Chest wall: No tenderness  Abdominal:      General: Abdomen is flat  There is no distension  Palpations: Abdomen is soft  Tenderness: There is no abdominal tenderness  There is no right CVA tenderness, left CVA tenderness or guarding  Musculoskeletal:         General: No swelling  Normal range of motion  Cervical back: Normal range of motion and neck supple  No rigidity or tenderness  Skin:     General: Skin is warm and dry  Capillary Refill: Capillary refill takes less than 2 seconds  Neurological:      Mental Status: He is alert and oriented to person, place, and time  Psychiatric:         Mood and Affect: Mood normal          Behavior: Behavior normal          ED Medications  Medications   acetaminophen (TYLENOL) tablet 650 mg (650 mg Oral Given 12/21/22 1901)   aluminum-magnesium hydroxide-simethicone (MYLANTA) oral suspension 30 mL (30 mL Oral Given 12/21/22 1901)   sucralfate (CARAFATE) tablet 1 g (1 g Oral Given 12/21/22 1901)       Diagnostic Studies  Results Reviewed     Procedure Component Value Units Date/Time    FLU/RSV/COVID - if FLU/RSV clinically relevant [[id number]]  (Abnormal) Collected: 12/21/22 1901    Lab Status: Final result Specimen: Nares from Nose Updated: 12/21/22 1946     SARS-CoV-2 Negative     INFLUENZA A PCR Positive     INFLUENZA B PCR Negative     RSV PCR Negative    Narrative:      FOR PEDIATRIC PATIENTS - copy/paste COVID Guidelines URL to browser: [email or web address]  [email or web address]    SARS-CoV-2 assay is a Nucleic Acid Amplification assay intended for the  qualitative detection of nucleic acid from SARS-CoV-2 in nasopharyngeal  swabs  Results are for the presumptive identification of SARS-CoV-2 RNA  Positive results are indicative of infection with SARS-CoV-2, the virus  causing COVID-19, but do not rule out bacterial infection or co-infection  with other viruses  Laboratories within the [place] and its  territories are required to report all positive results to the appropriate  public health authorities  Negative results do not preclude SARS-CoV-2  infection and should not be used as the sole basis for treatment or other  patient management decisions   Negative results must be combined with  clinical observations, patient history, and epidemiological information  This test has not been FDA cleared or approved  This test has been authorized by FDA under an Emergency Use Authorization  (EUA)  This test is only authorized for the duration of time the  declaration that circumstances exist justifying the authorization of the  emergency use of an in vitro diagnostic tests for detection of SARS-CoV-2  virus and/or diagnosis of COVID-19 infection under section 564(b)(1) of  the Act, 21 U  S C  050JKZ-2(H)(8), unless the authorization is terminated  or revoked sooner  The test has been validated but independent review by FDA  and CLIA is pending  Test performed using [employer] GeneXpert: This RT-PCR assay targets N2,  a region unique to SARS-CoV-2  A conserved region in the E-gene was chosen  for pan-Sarbecovirus detection which includes SARS-CoV-2  According to CMS-2020-01-R, this platform meets the definition of high-throughput technology  No orders to display         Procedures  Procedures      ED Course                             SBIRT 20yo+    Flowsheet Row Most Recent Value   SBIRT (25 yo +)    In order to provide better care to our patients, we are screening all of our patients for alcohol and drug use  Would it be okay to ask you these screening questions? No Filed at: 12/21/2022 1919                MDM  Number of Diagnoses or Management Options  Fever: new and requires workup  Influenza  Pharyngitis with viral syndrome: new and requires workup  Diagnosis management comments: Impression: 57-year-old male presents with signs and symptoms suggestive of viral infection  Pulmonary exam normal   No findings on history or physical to suggest bacterial pneumonia, retropharyngeal abscess, or peritonsillar abscess      Plan: Symptomatic treatment, influenza/COVID, follow-up with PCP    Patient reports sniffing and improvement in epigastric pain after Maalox and Carafate       Amount and/or Complexity of Data Reviewed  Clinical lab tests: ordered and reviewed  Review and summarize past medical records: yes    Risk of Complications, Morbidity, and/or Mortality  Presenting problems: low  Diagnostic procedures: minimal  Management options: minimal        Disposition  Final diagnoses:   Fever   Pharyngitis with viral syndrome   Influenza     Time reflects when diagnosis was documented in both MDM as applicable and the Disposition within this note     Time User Action Codes Description Comment    12/21/2022  7:23 PM Neyda Meredith [R50 9] Fever     12/21/2022  7:23 PM Henreitta Panda Add [J02 9,  B34 9] Pharyngitis with viral syndrome     12/21/2022  7:23 PM Tyler Patel [R50 9] Fever     12/21/2022  7:23 PM Henreitta Panda Modify [J02 9,  B34 9] Pharyngitis with viral syndrome     12/22/2022  1:54 PM Henreitta Panda Add [J11 1] Influenza       ED Disposition     ED Disposition   Discharge    Condition   Stable    Date/Time   Wed Dec 21, 2022  7:23 PM    [address] [place] [address] discharge to home/self care                 Follow-up Information     Follow up With Specialties Details Why Contact Info    Rhonda Abdi, [facility], Nurse Practitioner Call  If symptoms worsen [address]  [phone]            Discharge Medication List as of 12/21/2022  7:24 PM      CONTINUE these medications which have NOT CHANGED    Details   acetaminophen (TYLENOL) 500 mg tablet Take 1 tablet (500 mg total) by mouth every 6 (six) hours as needed for mild pain or moderate pain, Starting Tue 7/30/2019, Print      !! albuterol (PROVENTIL HFA,VENTOLIN HFA) 90 mcg/act inhaler Inhale 1 puff every 6 (six) hours as needed, Starting Mon 3/11/2019, Historical Med      !! albuterol (PROVENTIL HFA,VENTOLIN HFA) 90 mcg/act inhaler Inhale 2 puffs every 4 (four) hours as needed for wheezing, Starting Sat 4/17/2021, Normal      baclofen 10 mg tablet Take 1 tablet (10 mg total) by mouth 3 (three) times a day for 4 days, Starting Wed 9/25/2019, Until Sun 9/29/2019, Print      citalopram (CeleXA) 20 mg tablet Take 20 mg by mouth daily, Historical Med      fluticasone-vilanterol (BREO ELLIPTA) 200-25 MCG/INH inhaler Inhale 1 puff daily, Starting Mon 8/20/2018, Historical Med      meclizine (ANTIVERT) 25 mg tablet Take 1 tablet (25 mg total) by mouth 3 (three) times a day as needed for dizziness, Starting Wed 6/15/2022, Normal      methocarbamol (ROBAXIN) 500 mg tablet Take 1 tablet (500 mg total) by mouth 2 (two) times a day as needed for muscle spasms for up to 5 days, Starting Sat 12/10/2022, Until Thu 12/15/2022 at 2359, Normal      naproxen (NAPROSYN) 500 mg tablet Take 1 tablet (500 mg total) by mouth 2 (two) times a day with meals, Starting Wed 9/30/2020, Normal      ondansetron (Zofran ODT) 4 mg disintegrating tablet Take 1 tablet (4 mg total) by mouth every 6 (six) hours as needed for nausea or vomiting, Starting Wed 6/15/2022, Normal      predniSONE 10 mg tablet Take by mouth, Starting Wed 8/15/2012, Historical Med      tiotropium (SPIRIVA RESPIMAT) 1 25 MCG/ACT AERS inhaler Inhale 2 5 mcg daily, Starting Mon 8/20/2018, Historical Med      traZODone (DESYREL) 50 mg tablet Take 50 mg by mouth, Historical Med      triamcinolone (KENALOG) 0 1 % cream Apply topically 2 (two) times a day for 7 days, Starting Sun 10/10/2021, Until Sun 10/17/2021, Normal       !! - Potential duplicate medications found  Please discuss with provider  No discharge procedures on file  PDMP Review     None           ED Provider  Attending physically available and evaluated [facility]  I managed the patient along with the ED Attending      Electronically Signed by         Don Paulino MD  12/22/22 [id number]

## 2022-12-22 ENCOUNTER — HOSPITAL ENCOUNTER (EMERGENCY)
Facility: HOSPITAL | Age: 42
Discharge: HOME | End: 2022-12-23
Attending: EMERGENCY MEDICINE

## 2022-12-22 VITALS
DIASTOLIC BLOOD PRESSURE: 62 MMHG | RESPIRATION RATE: 18 BRPM | HEART RATE: 91 BPM | OXYGEN SATURATION: 96 % | TEMPERATURE: 99.7 F | SYSTOLIC BLOOD PRESSURE: 129 MMHG

## 2022-12-22 DIAGNOSIS — R21 RASH: Primary | ICD-10-CM

## 2022-12-22 DIAGNOSIS — L50.9 URTICARIA: ICD-10-CM

## 2022-12-22 RX ORDER — DIPHENHYDRAMINE HCL 25 MG
50 TABLET ORAL ONCE
Status: COMPLETED | OUTPATIENT
Start: 2022-12-23 | End: 2022-12-22

## 2022-12-22 RX ORDER — FAMOTIDINE 20 MG/1
20 TABLET, FILM COATED ORAL ONCE
Status: COMPLETED | OUTPATIENT
Start: 2022-12-23 | End: 2022-12-22

## 2022-12-22 RX ADMIN — FAMOTIDINE 20 MG: 20 TABLET, FILM COATED ORAL at 23:58

## 2022-12-22 RX ADMIN — DIPHENHYDRAMINE HCL 50 MG: 25 TABLET, COATED ORAL at 23:58

## 2022-12-22 NOTE — DISCHARGE INSTRUCTIONS
Tylenol 500 mg every 6 hours as needed for sore throat and pain   Maalox in the pharmacy and use as directed  Turn to ER if experience any chest pain, shortness of breath, or worsening sore throat with neck pain or neck stiffness

## 2022-12-23 RX ORDER — CETIRIZINE HYDROCHLORIDE 10 MG/1
10 TABLET ORAL DAILY
Qty: 10 TABLET | Refills: 0 | Status: SHIPPED | OUTPATIENT
Start: 2022-12-23

## 2022-12-23 RX ORDER — DIPHENHYDRAMINE HCL 25 MG
25 TABLET ORAL EVERY 6 HOURS
Qty: 20 TABLET | Refills: 0 | Status: SHIPPED | OUTPATIENT
Start: 2022-12-23

## 2022-12-23 NOTE — ED ATTENDING ATTESTATION
12/22/2022  IEdmund DO, saw and evaluated the patient  I have discussed the patient with the resident/non-physician practitioner and agree with the resident's/non-physician practitioner's findings, Plan of Care, and MDM as documented in the resident's/non-physician practitioner's note, except where noted  All available labs and Radiology studies were reviewed  I was present for key portions of any procedure(s) performed by the resident/non-physician practitioner and I was immediately available to provide assistance  At this point I agree with the current assessment done in the Emergency Department  I have conducted an independent evaluation of this patient a history and physical is as follows:  MDM  Number of Diagnoses or Management Options  Rash: new, needed workup  Urticaria: new, needed workup  Diagnosis management comments: 42M with rash, left torso and extremities noted  Pruritic noted, no meds taken recent abx use  Exam- no intra oral lesions  Urticaria noted  Blanchable, macular  Plan- benadryl, pepcid, no indication of epi at this time, no airway compromise or respiratory issues noted  Pt re-examined and evaluated after testing and treatment  Spoke with the patient and feeling improved and sxs have resolved  Will discharge home with close f/u with pcp and instructed to return to the ED if sxs worsen or continue  Pt agrees with the plan for discharge and feels comfortable to go home with proper f/u  Advised to return for worsening or additional problems  Diagnostic tests were reviewed and questions answered  Diagnosis, care plan and treatment options were discussed  The patient understand instructions and will follow up as directed      Counseling: I had a detailed discussion with the patient and/or guardian regarding: the historical points, exam findings, and any diagnostic results supporting the discharge diagnosis, lab results, radiology results, discharge instructions reviewed with patient and/or family/caregiver and understanding was verbalized  Instructions given to return to the emergency department if symptoms worsen or persist, or if there are any questions or concerns that arise at home  All labs reviewed and utilized in the medical decision making process    All radiology studies independently viewed by me and interpreted by the radiologist                     Lab Results: Lab Results: I have personally reviewed pertinent lab results  Imaging: I have personally reviewed pertinent reports      EKG, Pathology, and Other Studies: I have personally reviewed pertinent films in PACS    Clinical Impression:    Final diagnoses:   Rash   Urticaria         Disposition    discharged           New Prescriptions:    Discharge Medication List as of 12/23/2022 12:39 AM      START taking these medications    Details   cetirizine (ZyrTEC) 10 mg tablet Take 1 tablet (10 mg total) by mouth daily, Starting Fri 12/23/2022, Normal      diphenhydrAMINE (BENADRYL) 25 mg tablet Take 1 tablet (25 mg total) by mouth every 6 (six) hours, Starting Fri 12/23/2022, Normal                  Follow-up Instructions:    [facility] [address] Emergency Department  [address] [id number] [phone]  Go to   If symptoms worsen    SINDHU Stroud  [address] [facility]    Go to   If symptoms worsen        ED Course         Critical Care Time  Procedures

## 2022-12-23 NOTE — ED ATTENDING ATTESTATION
12/21/2022  IMichelle MD, saw and evaluated the patient  I have discussed the patient with the resident/non-physician practitioner and agree with the resident's/non-physician practitioner's findings, Plan of Care, and MDM as documented in the resident's/non-physician practitioner's note, except where noted  All available labs and Radiology studies were reviewed  I was present for key portions of any procedure(s) performed by the resident/non-physician practitioner and I was immediately available to provide assistance  At this point I agree with the current assessment done in the Emergency Department  I have conducted an independent evaluation of this patient a history and physical is as follows:    ED Course     26-year-old male presents with fevers onset of symptoms was yesterday  Associated symptoms include headache generalized body aches and pains  Patient also admits to epigastric abdominal pain  Vital signs reviewed patient well-appearing nontoxic no acute distress no rashes noted  Heart regular rate and rhythm without murmurs  Lungs clear to auscultation bilaterally  Abdomen soft with mild epigastric tenderness to palpation no guarding or rebound noted    Extremities no edema    Impression: Viral syndrome we will check flu COVID RSV supportive care including pain medication GI cocktail reassess anticipate discharge    Critical Care Time  Procedures

## 2022-12-23 NOTE — ED PROVIDER NOTES
History  Chief Complaint   Patient presents with   • Rash     Pt reports being seen here for the flu yesterday and now developing a rash all over his body  Pt states this started this morning and has taken nothing for relief      Patient is a 40-year-old male presenting for new onset rash that began today  No significant past medical history of note patient recently tested positive for flu yesterday (has been experiencing flulike symptoms)  Patient states rash began this morning, was pruritic but not painful and spread all over his torso his hands his arms and his legs  Patient denies any new contact to any allergens, although he does state he is allergic to penicillins and recently completed a course of doxycycline in the last 2 weeks (been several days since he completed the course of doxycycline)  patient has not started any new medications, has not changed detergents, has not been in contact has not traveled to any new areas  On exam, patient has extensive rash that is macular, blanching, erythematous, slightly raised, appears urticarial   Rest of physical exam is benign  Prior to Admission Medications   Prescriptions Last Dose Informant Patient Reported?  Taking?   acetaminophen (TYLENOL) 500 mg tablet   No No   Sig: Take 1 tablet (500 mg total) by mouth every 6 (six) hours as needed for mild pain or moderate pain   Patient not taking: Reported on 8/25/2022   albuterol (PROVENTIL HFA,VENTOLIN HFA) 90 mcg/act inhaler   Yes No   Sig: Inhale 1 puff every 6 (six) hours as needed   albuterol (PROVENTIL HFA,VENTOLIN HFA) 90 mcg/act inhaler   No No   Sig: Inhale 2 puffs every 4 (four) hours as needed for wheezing   baclofen 10 mg tablet   No No   Sig: Take 1 tablet (10 mg total) by mouth 3 (three) times a day for 4 days   citalopram (CeleXA) 20 mg tablet   Yes No   Sig: Take 20 mg by mouth daily   Patient not taking: Reported on 8/25/2022   fluticasone-vilanterol (BREO ELLIPTA) 200-25 MCG/INH inhaler   Yes No   Sig: Inhale 1 puff daily   meclizine (ANTIVERT) 25 mg tablet   No No   Sig: Take 1 tablet (25 mg total) by mouth 3 (three) times a day as needed for dizziness   methocarbamol (ROBAXIN) 500 mg tablet   No No   Sig: Take 1 tablet (500 mg total) by mouth 2 (two) times a day as needed for muscle spasms for up to 5 days   naproxen (NAPROSYN) 500 mg tablet   No No   Sig: Take 1 tablet (500 mg total) by mouth 2 (two) times a day with meals   Patient not taking: Reported on 8/25/2022   ondansetron (Zofran ODT) 4 mg disintegrating tablet   No No   Sig: Take 1 tablet (4 mg total) by mouth every 6 (six) hours as needed for nausea or vomiting   Patient not taking: Reported on 8/25/2022   predniSONE 10 mg tablet   Yes No   Sig: Take by mouth   Patient not taking: Reported on 8/25/2022   tiotropium (SPIRIVA RESPIMAT) 1 25 MCG/ACT AERS inhaler   Yes No   Sig: Inhale 2 5 mcg daily   Patient not taking: Reported on 8/25/2022   traZODone (DESYREL) 50 mg tablet   Yes No   Sig: Take 50 mg by mouth   triamcinolone (KENALOG) 0 1 % cream   No No   Sig: Apply topically 2 (two) times a day for 7 days      Facility-Administered Medications: None       Past Medical History:   Diagnosis Date   • Asthma    • Mitral valve prolapse        Past Surgical History:   Procedure Laterality Date   • ROTATOR CUFF REPAIR Bilateral     2020 + 2017       History reviewed  No pertinent family history  I have reviewed and agree with the history as documented  E-Cigarette/Vaping   • E-Cigarette Use Never User      E-Cigarette/Vaping Substances   • Nicotine No    • THC No    • CBD No    • Flavoring No    • Other No    • Unknown No      Social History     Tobacco Use   • Smoking status: Never   • Smokeless tobacco: Never   Vaping Use   • Vaping Use: Never used   Substance Use Topics   • Alcohol use: Not Currently     Comment: socially   • Drug use: No        Review of Systems   Constitutional: Positive for chills and fever     HENT: Positive for congestion and rhinorrhea  Eyes: Negative  Respiratory: Negative  Cardiovascular: Negative  Gastrointestinal: Negative  Endocrine: Negative  Genitourinary: Negative  Musculoskeletal: Negative  Skin: Positive for rash  Allergic/Immunologic: Negative  Neurological: Negative  Hematological: Negative  Psychiatric/Behavioral: Negative  Physical Exam  ED Triage Vitals [12/22/22 2328]   Temperature Pulse Respirations Blood Pressure SpO2   99 7 °F (37 6 °C) 91 18 129/62 96 %      Temp Source Heart Rate Source Patient Position - Orthostatic VS BP Location FiO2 (%)   Oral Monitor Lying Right arm --      Pain Score       --             Orthostatic Vital Signs  Vitals:    12/22/22 2328   BP: 129/62   Pulse: 91   Patient Position - Orthostatic VS: Lying       Physical Exam  Vitals and nursing note reviewed  Constitutional:       Appearance: Normal appearance  He is normal weight  HENT:      Head: Normocephalic and atraumatic  Right Ear: Tympanic membrane, ear canal and external ear normal       Left Ear: Tympanic membrane, ear canal and external ear normal       Nose: Nose normal       Mouth/Throat:      Mouth: Mucous membranes are moist       Pharynx: Oropharynx is clear  Eyes:      Extraocular Movements: Extraocular movements intact  Conjunctiva/sclera: Conjunctivae normal       Pupils: Pupils are equal, round, and reactive to light  Cardiovascular:      Rate and Rhythm: Normal rate and regular rhythm  Pulses: Normal pulses  Heart sounds: Normal heart sounds  Pulmonary:      Effort: Pulmonary effort is normal       Breath sounds: Normal breath sounds  Abdominal:      General: Abdomen is flat  Bowel sounds are normal       Palpations: Abdomen is soft  Musculoskeletal:         General: Normal range of motion  Cervical back: Normal range of motion and neck supple  Skin:     Capillary Refill: Capillary refill takes less than 2 seconds  Findings: Rash present  Comments: Patient has diffuse erythematous, raised, pruritic rash extending over her trunk, both upper extremities and lower extremities  Appears urticarial   Rash is not painful only pruritic  Neurological:      General: No focal deficit present  Mental Status: He is alert and oriented to person, place, and time  Psychiatric:         Mood and Affect: Mood normal          Behavior: Behavior normal          Thought Content: Thought content normal          Judgment: Judgment normal          ED Medications  Medications   diphenhydrAMINE (BENADRYL) tablet 50 mg (50 mg Oral Given 12/22/22 2358)   famotidine (PEPCID) tablet 20 mg (20 mg Oral Given 12/22/22 2358)       Diagnostic Studies  Results Reviewed     None                 No orders to display         Procedures  Procedures      ED Course                             SBIRT 20yo+    Flowsheet Row Most Recent Value   SBIRT (23 yo +)    In order to provide better care to our patients, we are screening all of our patients for alcohol and drug use  Would it be okay to ask you these screening questions? Yes Filed at: 12/22/2022 2359   Initial Alcohol Screen: US AUDIT-C     1  How often do you have a drink containing alcohol? 0 Filed at: 12/22/2022 2359   2  How many drinks containing alcohol do you have on a typical day you are drinking? 0 Filed at: 12/22/2022 2359   3a  Male UNDER 65: How often do you have five or more drinks on one occasion? 0 Filed at: 12/22/2022 2359   3b  FEMALE Any Age, or MALE 65+: How often do you have 4 or more drinks on one occassion? 0 Filed at: 12/22/2022 2359   Audit-C Score 0 Filed at: 12/22/2022 2359   NELSON: How many times in the past year have you    Used an illegal drug or used a prescription medication for non-medical reasons?  Never Filed at: 12/22/2022 2359                MDM  Number of Diagnoses or Management Options  Rash: new and does not require workup  Urticaria: new and does not require workup  Diagnosis management comments: Patient is 49-year-old male presenting for rash  Ddx: Allergic reaction, Urticaria, contact dermatitis, cellulitis, viral exanthem  Based  on patient presentation and  physical exam findings, primary concern is for allergic reaction/urticaria  We will plan to administer Benadryl and Pepcid  Plan to discharge patient with prescription for Benadryl and Zyrtec with return precautions  Recommend follow-up with primary care physician for further evaluation   -Patient states pruritus and rash appear to have improved Benadryl and Pepcid  Patient given scripts and instructions for medication, understands return precautions, ready for discharge  Amount and/or Complexity of Data Reviewed  Clinical lab tests: ordered and reviewed  Tests in the radiology section of CPT®: ordered and reviewed  Tests in the medicine section of CPT®: ordered and reviewed  Review and summarize past medical records: yes  Independent visualization of images, tracings, or specimens: yes    Risk of Complications, Morbidity, and/or Mortality  Presenting problems: low  Diagnostic procedures: low  Management options: low        Disposition  Final diagnoses:   Rash   Urticaria     Time reflects when diagnosis was documented in both MDM as applicable and the Disposition within this note     Time User Action Codes Description Comment    12/22/2022 11:59 PM Willian Grit Add [R21] Rash     12/22/2022 11:59 PM Willian Grit Add [L50 9] Urticaria       ED Disposition     ED Disposition   Discharge    Condition   Stable    Date/Time   Thu Dec 22, 2022 11:59 PM    [address] [place] [address] discharge to home/self care                 Follow-up Information     Follow up With Specialties Details Why Contact Info Additional [address] Emergency Department Emergency Medicine Go to  If symptoms worsen [address] [phone]  [id number]  [address] [facility] Emergency Department, [address], [place], [place], [address]    Tiffani Jerry, [address], Nurse Practitioner Go to  If symptoms worsen [address] [facility]             Discharge Medication List as of 12/23/2022 12:39 AM      START taking these medications    Details   cetirizine (ZyrTEC) 10 mg tablet Take 1 tablet (10 mg total) by mouth daily, Starting Fri 12/23/2022, Normal      diphenhydrAMINE (BENADRYL) 25 mg tablet Take 1 tablet (25 mg total) by mouth every 6 (six) hours, Starting Fri 12/23/2022, Normal         CONTINUE these medications which have NOT CHANGED    Details   acetaminophen (TYLENOL) 500 mg tablet Take 1 tablet (500 mg total) by mouth every 6 (six) hours as needed for mild pain or moderate pain, Starting Tue 7/30/2019, Print      !! albuterol (PROVENTIL HFA,VENTOLIN HFA) 90 mcg/act inhaler Inhale 1 puff every 6 (six) hours as needed, Starting Mon 3/11/2019, Historical Med      !! albuterol (PROVENTIL HFA,VENTOLIN HFA) 90 mcg/act inhaler Inhale 2 puffs every 4 (four) hours as needed for wheezing, Starting Sat 4/17/2021, Normal      baclofen 10 mg tablet Take 1 tablet (10 mg total) by mouth 3 (three) times a day for 4 days, Starting Wed 9/25/2019, Until Sun 9/29/2019, Print      citalopram (CeleXA) 20 mg tablet Take 20 mg by mouth daily, Historical Med      fluticasone-vilanterol (BREO ELLIPTA) 200-25 MCG/INH inhaler Inhale 1 puff daily, Starting Mon 8/20/2018, Historical Med      meclizine (ANTIVERT) 25 mg tablet Take 1 tablet (25 mg total) by mouth 3 (three) times a day as needed for dizziness, Starting Wed 6/15/2022, Normal      methocarbamol (ROBAXIN) 500 mg tablet Take 1 tablet (500 mg total) by mouth 2 (two) times a day as needed for muscle spasms for up to 5 days, Starting Sat 12/10/2022, Until u 12/15/2022 at 2359, Normal      naproxen (NAPROSYN) 500 mg tablet Take 1 tablet (500 mg total) by mouth 2 (two) times a day with meals, Starting Wed 9/30/2020, Normal      ondansetron (Zofran ODT) 4 mg disintegrating tablet Take 1 tablet (4 mg total) by mouth every 6 (six) hours as needed for nausea or vomiting, Starting Wed 6/15/2022, Normal      predniSONE 10 mg tablet Take by mouth, Starting Wed 8/15/2012, Historical Med      tiotropium (SPIRIVA RESPIMAT) 1 25 MCG/ACT AERS inhaler Inhale 2 5 mcg daily, Starting Mon 8/20/2018, Historical Med      traZODone (DESYREL) 50 mg tablet Take 50 mg by mouth, Historical Med      triamcinolone (KENALOG) 0 1 % cream Apply topically 2 (two) times a day for 7 days, Starting Sun 10/10/2021, Until Sun 10/17/2021, Normal       !! - Potential duplicate medications found  Please discuss with provider  No discharge procedures on file  PDMP Review     None           ED Provider  Attending physically available and evaluated [facility]  I managed the patient along with the ED Attending      Electronically Signed by         Laurie Dias MD  12/23/22 3103

## 2022-12-23 NOTE — DISCHARGE INSTRUCTIONS
Please take benadryl and zyrtec as prescribed for pruritis and rash  Symptoms should resolve over the next few days

## 2023-01-18 ENCOUNTER — APPOINTMENT (EMERGENCY)
Dept: RADIOLOGY | Facility: HOSPITAL | Age: 43
End: 2023-01-18

## 2023-01-18 ENCOUNTER — HOSPITAL ENCOUNTER (EMERGENCY)
Facility: HOSPITAL | Age: 43
Discharge: HOME | End: 2023-01-19
Attending: EMERGENCY MEDICINE

## 2023-01-18 DIAGNOSIS — R00.2 PALPITATIONS: Primary | ICD-10-CM

## 2023-01-18 LAB
ALBUMIN SERPL BCP-MCNC: 4.2 G/DL (ref 3.5–5)
ALP SERPL-CCNC: 71 U/L (ref 46–116)
ALT SERPL W P-5'-P-CCNC: 19 U/L (ref 12–78)
ANION GAP SERPL CALCULATED.3IONS-SCNC: 8 MMOL/L (ref 4–13)
AST SERPL W P-5'-P-CCNC: 11 U/L (ref 5–45)
ATRIAL RATE: 78 BPM
BASOPHILS # BLD AUTO: 0.04 THOUSANDS/ÂΜL (ref 0–0.1)
BASOPHILS NFR BLD AUTO: 0 % (ref 0–1)
BILIRUB SERPL-MCNC: 1.62 MG/DL (ref 0.2–1)
BUN SERPL-MCNC: 9 MG/DL (ref 5–25)
CALCIUM SERPL-MCNC: 9.6 MG/DL (ref 8.3–10.1)
CARDIAC TROPONIN I PNL SERPL HS: 4 NG/L
CHLORIDE SERPL-SCNC: 108 MMOL/L (ref 96–108)
CO2 SERPL-SCNC: 25 MMOL/L (ref 21–32)
CREAT SERPL-MCNC: 0.79 MG/DL (ref 0.6–1.3)
EOSINOPHIL # BLD AUTO: 0.14 THOUSAND/ÂΜL (ref 0–0.61)
EOSINOPHIL NFR BLD AUTO: 2 % (ref 0–6)
ERYTHROCYTE [DISTWIDTH] IN BLOOD BY AUTOMATED COUNT: 12.2 % (ref 11.6–15.1)
GFR SERPL CREATININE-BSD FRML MDRD: 109 ML/MIN/1.73SQ M
GLUCOSE SERPL-MCNC: 116 MG/DL (ref 65–140)
HCT VFR BLD AUTO: 41 % (ref 36.5–49.3)
HGB BLD-MCNC: 14 G/DL (ref 12–17)
IMM GRANULOCYTES # BLD AUTO: 0.04 THOUSAND/UL (ref 0–0.2)
IMM GRANULOCYTES NFR BLD AUTO: 0 % (ref 0–2)
LYMPHOCYTES # BLD AUTO: 2.01 THOUSANDS/ÂΜL (ref 0.6–4.47)
LYMPHOCYTES NFR BLD AUTO: 22 % (ref 14–44)
MCH RBC QN AUTO: 31.6 PG (ref 26.8–34.3)
MCHC RBC AUTO-ENTMCNC: 34.1 G/DL (ref 31.4–37.4)
MCV RBC AUTO: 93 FL (ref 82–98)
MONOCYTES # BLD AUTO: 0.7 THOUSAND/ÂΜL (ref 0.17–1.22)
MONOCYTES NFR BLD AUTO: 8 % (ref 4–12)
NEUTROPHILS # BLD AUTO: 6.36 THOUSANDS/ÂΜL (ref 1.85–7.62)
NEUTS SEG NFR BLD AUTO: 68 % (ref 43–75)
NRBC BLD AUTO-RTO: 0 /100 WBCS
P AXIS: 63 DEGREES
PLATELET # BLD AUTO: 296 THOUSANDS/UL (ref 149–390)
PMV BLD AUTO: 9.7 FL (ref 8.9–12.7)
POTASSIUM SERPL-SCNC: 3.5 MMOL/L (ref 3.5–5.3)
PR INTERVAL: 172 MS
PROT SERPL-MCNC: 6.9 G/DL (ref 6.4–8.4)
QRS AXIS: 70 DEGREES
QRSD INTERVAL: 108 MS
QT INTERVAL: 356 MS
QTC INTERVAL: 405 MS
RBC # BLD AUTO: 4.43 MILLION/UL (ref 3.88–5.62)
SODIUM SERPL-SCNC: 141 MMOL/L (ref 135–147)
T WAVE AXIS: -7 DEGREES
T4 FREE SERPL-MCNC: 1.07 NG/DL (ref 0.76–1.46)
TSH SERPL DL<=0.05 MIU/L-ACNC: 0.42 UIU/ML (ref 0.45–4.5)
VENTRICULAR RATE: 78 BPM
WBC # BLD AUTO: 9.29 THOUSAND/UL (ref 4.31–10.16)

## 2023-01-19 VITALS
HEART RATE: 77 BPM | DIASTOLIC BLOOD PRESSURE: 82 MMHG | TEMPERATURE: 99.1 F | OXYGEN SATURATION: 96 % | RESPIRATION RATE: 16 BRPM | SYSTOLIC BLOOD PRESSURE: 152 MMHG

## 2023-01-19 LAB
2HR DELTA HS TROPONIN: 0 NG/L
CARDIAC TROPONIN I PNL SERPL HS: 4 NG/L

## 2023-01-19 NOTE — DISCHARGE INSTRUCTIONS
You were seen in the emergency department today for palpitations  Your testing showed no evidence of heart attack, no electrolyte abnormalities, no anemia  Follow up with your primary care provider regarding your TSH  Follow up with your cardiologist as scheduled for your echocardiogram, also follow up with them for your palpitations  Return to the emergency department for any new or concerning symptoms including chest pain, difficulty breathing, dizziness / lightheaded, palpitations  Thank you for choosing [facility] for your care today

## 2023-01-19 NOTE — ED PROVIDER NOTES
History  Chief Complaint   Patient presents with   • Palpitations     Pt reports feeling like his heart was racing at approx 2000  Denies pain but reports increased SOB  Pt states the palpitations have stopped since arrival      Patient is a 38year old male with history of asthma, MVP, who presents to the emergency department for palpitations  He states that at 8:00 tonight he was making dinner for his daughter when he suddenly felt like his heart was racing  The episode lasted for approximately 10 minutes, he checked his heart rate at this time and states that it was in the 120s  Patient denies having any chest pain, difficulty breathing, lightheadedness during this episode  Patient does have a history of mitral valve prolapse and has been evaluated for this by cardiology  Patient is supposed to get a transesophageal echocardiogram completed  Patient also denies having any nausea, vomiting, diarrhea, recent illness, fevers, chills  Patient also denies having any unilateral leg swelling, recent immobilization or surgery, history of blood clots  Prior to Admission Medications   Prescriptions Last Dose Informant Patient Reported?  Taking?   acetaminophen (TYLENOL) 500 mg tablet   No No   Sig: Take 1 tablet (500 mg total) by mouth every 6 (six) hours as needed for mild pain or moderate pain   Patient not taking: Reported on 8/25/2022   albuterol (PROVENTIL HFA,VENTOLIN HFA) 90 mcg/act inhaler   Yes No   Sig: Inhale 1 puff every 6 (six) hours as needed   albuterol (PROVENTIL HFA,VENTOLIN HFA) 90 mcg/act inhaler   No No   Sig: Inhale 2 puffs every 4 (four) hours as needed for wheezing   baclofen 10 mg tablet   No No   Sig: Take 1 tablet (10 mg total) by mouth 3 (three) times a day for 4 days   cetirizine (ZyrTEC) 10 mg tablet   No No   Sig: Take 1 tablet (10 mg total) by mouth daily   citalopram (CeleXA) 20 mg tablet   Yes No   Sig: Take 20 mg by mouth daily   Patient not taking: Reported on 8/25/2022 diphenhydrAMINE (BENADRYL) 25 mg tablet   No No   Sig: Take 1 tablet (25 mg total) by mouth every 6 (six) hours   fluticasone-vilanterol (BREO ELLIPTA) 200-25 MCG/INH inhaler   Yes No   Sig: Inhale 1 puff daily   meclizine (ANTIVERT) 25 mg tablet   No No   Sig: Take 1 tablet (25 mg total) by mouth 3 (three) times a day as needed for dizziness   methocarbamol (ROBAXIN) 500 mg tablet   No No   Sig: Take 1 tablet (500 mg total) by mouth 2 (two) times a day as needed for muscle spasms for up to 5 days   naproxen (NAPROSYN) 500 mg tablet   No No   Sig: Take 1 tablet (500 mg total) by mouth 2 (two) times a day with meals   Patient not taking: Reported on 8/25/2022   ondansetron (Zofran ODT) 4 mg disintegrating tablet   No No   Sig: Take 1 tablet (4 mg total) by mouth every 6 (six) hours as needed for nausea or vomiting   Patient not taking: Reported on 8/25/2022   predniSONE 10 mg tablet   Yes No   Sig: Take by mouth   Patient not taking: Reported on 8/25/2022   tiotropium (SPIRIVA RESPIMAT) 1 25 MCG/ACT AERS inhaler   Yes No   Sig: Inhale 2 5 mcg daily   Patient not taking: Reported on 8/25/2022   traZODone (DESYREL) 50 mg tablet   Yes No   Sig: Take 50 mg by mouth   triamcinolone (KENALOG) 0 1 % cream   No No   Sig: Apply topically 2 (two) times a day for 7 days      Facility-Administered Medications: None       Past Medical History:   Diagnosis Date   • Asthma    • Mitral valve prolapse        Past Surgical History:   Procedure Laterality Date   • ROTATOR CUFF REPAIR Bilateral     2020 + 2017       History reviewed  No pertinent family history  I have reviewed and agree with the history as documented      E-Cigarette/Vaping   • E-Cigarette Use Never User      E-Cigarette/Vaping Substances   • Nicotine No    • THC No    • CBD No    • Flavoring No    • Other No    • Unknown No      Social History     Tobacco Use   • Smoking status: Never   • Smokeless tobacco: Never   Vaping Use   • Vaping Use: Never used   Substance Use Topics   • Alcohol use: Not Currently     Comment: socially   • Drug use: No        Review of Systems   Constitutional: Negative for fever  HENT: Negative for congestion  Eyes: Negative for pain  Respiratory: Negative for cough and chest tightness  Cardiovascular: Positive for palpitations  Negative for chest pain and leg swelling  Gastrointestinal: Negative for diarrhea and vomiting  Genitourinary: Negative for dysuria  Musculoskeletal: Negative for back pain  Skin: Negative for rash  Neurological: Negative for dizziness  All other systems reviewed and are negative  Physical Exam  ED Triage Vitals   Temperature Pulse Respirations Blood Pressure SpO2   01/18/23 2048 01/18/23 2034 01/18/23 2034 01/18/23 2034 01/18/23 2034   99 1 °F (37 3 °C) 85 18 133/72 98 %      Temp Source Heart Rate Source Patient Position - Orthostatic VS BP Location FiO2 (%)   01/18/23 2048 -- -- -- --   Oral          Pain Score       01/18/23 2034       No Pain             Orthostatic Vital Signs  Vitals:    01/18/23 2034 01/19/23 0030   BP: 133/72 152/82   Pulse: 85 77       Physical Exam  Vitals and nursing note reviewed  Constitutional:       Appearance: Normal appearance  HENT:      Head: Normocephalic and atraumatic  Mouth/Throat:      Mouth: Mucous membranes are moist    Eyes:      Conjunctiva/sclera: Conjunctivae normal    Cardiovascular:      Rate and Rhythm: Normal rate and regular rhythm  Pulses: Normal pulses  Heart sounds: Murmur heard  Pulmonary:      Effort: Pulmonary effort is normal       Breath sounds: Normal breath sounds  Chest:      Chest wall: No tenderness  Abdominal:      Palpations: Abdomen is soft  Tenderness: There is no abdominal tenderness  Musculoskeletal:         General: No tenderness  Cervical back: Neck supple  Right lower leg: No edema  Left lower leg: No edema  Skin:     General: Skin is warm and dry        Capillary Refill: Capillary refill takes less than 2 seconds  Neurological:      General: No focal deficit present  Mental Status: He is alert  Mental status is at baseline  Psychiatric:         Mood and Affect: Mood normal          ED Medications  Medications - No data to display    Diagnostic Studies  Results Reviewed     Procedure Component Value Units Date/Time    HS Troponin I 2hr [[id number]]  (Normal) Collected: 01/18/23 2341    Lab Status: Final result Specimen: Blood from Arm, Left Updated: 01/19/23 0012     hs TnI 2hr 4 ng/L      Delta 2hr hsTnI 0 ng/L     HS Troponin I 4hr [[id number]]     Lab Status: No result Specimen: Blood     T4, free [[id number]]  (Normal) Collected: 01/18/23 2128    Lab Status: Final result Specimen: Blood from Arm, Left Updated: 01/18/23 2219     Free T4 1 07 ng/dL     TSH, 3rd generation with Free T4 reflex [[id number]]  (Abnormal) Collected: 01/18/23 2128    Lab Status: Final result Specimen: Blood from Arm, Left Updated: 01/18/23 2203     TSH 3RD GENERATON 0 422 uIU/mL     Narrative:      Patients undergoing fluorescein dye angiography may retain small amounts of fluorescein in the body for 48-72 hours post procedure  Samples containing fluorescein can produce falsely depressed TSH values  If the patient had this procedure,a specimen should be resubmitted post fluorescein clearance        HS Troponin 0hr (reflex protocol) [[id number]]  (Normal) Collected: 01/18/23 2128    Lab Status: Final result Specimen: Blood from Arm, Left Updated: 01/18/23 2202     hs TnI 0hr 4 ng/L     Comprehensive metabolic panel [[id number]]  (Abnormal) Collected: 01/18/23 2128    Lab Status: Final result Specimen: Blood from Arm, Left Updated: 01/18/23 2157     Sodium 141 mmol/L      Potassium 3 5 mmol/L      Chloride 108 mmol/L      CO2 25 mmol/L      ANION GAP 8 mmol/L      BUN 9 mg/dL      Creatinine 0 79 mg/dL      Glucose 116 mg/dL      Calcium 9 6 mg/dL      AST 11 U/L      ALT 19 U/L      Alkaline Phosphatase 71 U/L      Total Protein 6 9 g/dL      Albumin 4 2 g/dL      Total Bilirubin 1 62 mg/dL      eGFR 109 ml/min/1 73sq m     Narrative:      [place] guidelines for Chronic Kidney Disease (CKD):   •  Stage 1 with normal or high GFR (GFR > 90 mL/min/1 73 square meters)  •  Stage 2 Mild CKD (GFR = 60-89 mL/min/1 73 square meters)  •  Stage 3A Moderate CKD (GFR = 45-59 mL/min/1 73 square meters)  •  Stage 3B Moderate CKD (GFR = 30-44 mL/min/1 73 square meters)  •  Stage 4 Severe CKD (GFR = 15-29 mL/min/1 73 square meters)  •  Stage 5 End Stage CKD (GFR <15 mL/min/1 73 square meters)  Note: GFR calculation is accurate only with a steady state creatinine    CBC and differential [[id number]] Collected: 01/18/23 2128    Lab Status: Final result Specimen: Blood from Arm, Left Updated: 01/18/23 2147     WBC 9 29 Thousand/uL      RBC 4 43 Million/uL      Hemoglobin 14 0 g/dL      Hematocrit 41 0 %      MCV 93 fL      MCH 31 6 pg      MCHC 34 1 g/dL      RDW 12 2 %      MPV 9 7 fL      Platelets 890 Thousands/uL      nRBC 0 /100 WBCs      Neutrophils Relative 68 %      Immat GRANS % 0 %      Lymphocytes Relative 22 %      Monocytes Relative 8 %      Eosinophils Relative 2 %      Basophils Relative 0 %      Neutrophils Absolute 6 36 Thousands/µL      Immature Grans Absolute 0 04 Thousand/uL      Lymphocytes Absolute 2 01 Thousands/µL      Monocytes Absolute 0 70 Thousand/µL      Eosinophils Absolute 0 14 Thousand/µL      Basophils Absolute 0 04 Thousands/µL                  XR chest 2 views   ED Interpretation by Loraine Dhillon DO (01/19 0018)   No acute cardiopulmonary disease            Procedures  Procedures      ED Course  ED Course as of 01/19/23 0203 Wed Jan 18, 2023 2116 Procedure Note: EKG  Date/Time: 01/18/23 9:16 PM   Interpreted by: Crow Cruz  Indications / Diagnosis: palpitations  ECG reviewed by me, the ED Provider: yes   The EKG demonstrates:  Rate: 78  Rhythm: NSR  Intervals: regular  Axis: normal  QRS/Blocks: regular  ST Changes: T wave inversions in II, III, aVF, more pronounced from prior on 9/16/22 2151 Hemoglobin: 14 0  No evidence of anemia   2210 TSH 3RD GENERATON(!): 0 422   2211 hs TnI 0hr: 4   2217 Comprehensive metabolic panel(!)  No acute electrolyte abnormalities   2249 Free T4: 1 07   2312 Patient resting comfortably  Discussed all results with the patient  2 hour troponin and CXR still pending   u Jan 19, 2023   0017 Delta 2hr hsTnI: 0             HEART Risk Score    Flowsheet Row Most Recent Value   Heart Score Risk Calculator    History 0 Filed at: 01/19/2023 0029   ECG 1 Filed at: 01/19/2023 0029   Age 0 Filed at: 01/19/2023 0029   Risk Factors 0 Filed at: 01/19/2023 0029   Troponin 0 Filed at: 01/19/2023 0029   HEART Score 1 Filed at: 01/19/2023 0029                      SBIRT 22yo+    Flowsheet Row Most Recent Value   SBIRT (25 yo +)    In order to provide better care to our patients, we are screening all of our patients for alcohol and drug use  Would it be okay to ask you these screening questions? No Filed at: 01/18/2023 2036                Medical Decision Making  Patient is a 58-year-old male with PMH of valve prolapse who presents to the ED with palpitations  Vital signs stable, not tachycardic  On exam patient resting comfortably in the room, murmur consistent with MVP  No unilateral leg swelling, distal pulses equal bilaterally in upper and lower extremities  Differential diagnosis included but not limited to SVT, ischemia, hyperthyroid, anemia, electrolyte abnormalities  Plan check labs and thyroid, EKG, troponin, chest x-ray    View ED course above for further discussion on patient workup  I reviewed all testing with the patient: Abnormal thyroid level  Upon re-evaluation patient resting comfortably, denies having any return of palpitations, denies having any chest pain or difficulty breathing while in the emergency department    I have reviewed the patient's vital signs, nursing notes, and other relevant tests/information  I had a detailed discussion with the patient regarding the history, exam findings, and any diagnostic results  Plan to discharge home in stable condition with follow up with cardiology, primary care  Discussed with patient who is agreeable to plan  I discussed discharge instructions, need for follow-up, and oral return precautions for what to return for in addition to the written return precautions and discharge instructions, specifically highlighting areas of special concern  The patient verbalized understanding of the discharge instructions and warnings that would necessitate return to the Emergency Department including chest pain, difficulty breathing, palpitations, leg swelling  All questions the patient had were answered prior to discharge  Palpitations: acute illness or injury  Amount and/or Complexity of Data Reviewed  Labs: ordered  Decision-making details documented in ED Course  Radiology: ordered and independent interpretation performed  Disposition  Final diagnoses:   Palpitations     Time reflects when diagnosis was documented in both MDM as applicable and the Disposition within this note     Time User Action Codes Description Comment    1/19/2023 12:27 AM [place] [R00 2] Palpitations       ED Disposition     ED Disposition   Discharge    Condition   Stable    Date/Time   Thu Jan 19, 2023 12:27 AM    [address] [place] [address] discharge to home/self care                 Follow-up Information     Follow up With Specialties Details Why Contact Info    [place], [place] [facility], Nurse Practitioner Call  As needed [address] [facility] [address] [facility]  [phone]            Discharge Medication List as of 1/19/2023 12:29 AM      CONTINUE these medications which have NOT CHANGED    Details   acetaminophen (TYLENOL) 500 mg tablet Take 1 tablet (500 mg total) by mouth every 6 (six) hours as needed for mild pain or moderate pain, Starting Tue 7/30/2019, Print      !! albuterol (PROVENTIL HFA,VENTOLIN HFA) 90 mcg/act inhaler Inhale 1 puff every 6 (six) hours as needed, Starting Mon 3/11/2019, Historical Med      !! albuterol (PROVENTIL HFA,VENTOLIN HFA) 90 mcg/act inhaler Inhale 2 puffs every 4 (four) hours as needed for wheezing, Starting Sat 4/17/2021, Normal      baclofen 10 mg tablet Take 1 tablet (10 mg total) by mouth 3 (three) times a day for 4 days, Starting Wed 9/25/2019, Until Sun 9/29/2019, Print      cetirizine (ZyrTEC) 10 mg tablet Take 1 tablet (10 mg total) by mouth daily, Starting Fri 12/23/2022, Normal      citalopram (CeleXA) 20 mg tablet Take 20 mg by mouth daily, Historical Med      diphenhydrAMINE (BENADRYL) 25 mg tablet Take 1 tablet (25 mg total) by mouth every 6 (six) hours, Starting Fri 12/23/2022, Normal      fluticasone-vilanterol (BREO ELLIPTA) 200-25 MCG/INH inhaler Inhale 1 puff daily, Starting Mon 8/20/2018, Historical Med      meclizine (ANTIVERT) 25 mg tablet Take 1 tablet (25 mg total) by mouth 3 (three) times a day as needed for dizziness, Starting Wed 6/15/2022, Normal      methocarbamol (ROBAXIN) 500 mg tablet Take 1 tablet (500 mg total) by mouth 2 (two) times a day as needed for muscle spasms for up to 5 days, Starting Sat 12/10/2022, Until Thu 12/15/2022 at 2359, Normal      naproxen (NAPROSYN) 500 mg tablet Take 1 tablet (500 mg total) by mouth 2 (two) times a day with meals, Starting Wed 9/30/2020, Normal      ondansetron (Zofran ODT) 4 mg disintegrating tablet Take 1 tablet (4 mg total) by mouth every 6 (six) hours as needed for nausea or vomiting, Starting Wed 6/15/2022, Normal      predniSONE 10 mg tablet Take by mouth, Starting Wed 8/15/2012, Historical Med      tiotropium (SPIRIVA RESPIMAT) 1 25 MCG/ACT AERS inhaler Inhale 2 5 mcg daily, Starting Mon 8/20/2018, Historical Med      traZODone (DESYREL) 50 mg tablet Take 50 mg by mouth, Historical Med      triamcinolone (KENALOG) 0 1 % cream Apply topically 2 (two) times a day for 7 days, Starting Sun 10/10/2021, Until Sun 10/17/2021, Normal       !! - Potential duplicate medications found  Please discuss with provider  No discharge procedures on file  PDMP Review     None           ED Provider  Attending physically available and evaluated [facility]  I managed the patient along with the ED Attending      Electronically Signed by         Candie Stuart DO  01/19/23 0201

## 2023-01-19 NOTE — ED ATTENDING ATTESTATION
1/18/2023  IBaldo DO, saw and evaluated the patient  I have discussed the patient with the resident/non-physician practitioner and agree with the resident's/non-physician practitioner's findings, Plan of Care, and MDM as documented in the resident's/non-physician practitioner's note, except where noted  All available labs and Radiology studies were reviewed  I was present for key portions of any procedure(s) performed by the resident/non-physician practitioner and I was immediately available to provide assistance  At this point I agree with the current assessment done in the Emergency Department  I have conducted an independent evaluation of this patient a history and physical is as follows:    49-year-old male presents with palpitations  Patient states that approximately 8 PM he had a 10-minute episode of fast heart rate that was associated with very mild shortness of breath  Denies chest pain, no dizziness or feeling lightheaded, symptoms have since resolved  Denies history of similar symptoms  No new medications, denies caffeine use, no over-the-counter cold medications  On exam-no acute distress, heart regular, no respiratory distress    Plan-check thyroid, electrolytes, CBC, ekg    ED Course         Critical Care Time  Procedures

## 2023-06-23 ENCOUNTER — APPOINTMENT (OUTPATIENT)
Dept: LAB | Facility: CLINIC | Age: 43
End: 2023-06-23
Payer: MEDICARE

## 2023-07-27 ENCOUNTER — TELEPHONE (OUTPATIENT)
Dept: PSYCHIATRY | Facility: CLINIC | Age: 43
End: 2023-07-27

## 2023-08-10 ENCOUNTER — HOSPITAL ENCOUNTER (EMERGENCY)
Facility: HOSPITAL | Age: 43
Discharge: HOME | End: 2023-08-10
Attending: EMERGENCY MEDICINE
Payer: COMMERCIAL

## 2023-08-10 VITALS
RESPIRATION RATE: 15 BRPM | OXYGEN SATURATION: 98 % | HEART RATE: 76 BPM | DIASTOLIC BLOOD PRESSURE: 64 MMHG | TEMPERATURE: 98.1 F | SYSTOLIC BLOOD PRESSURE: 130 MMHG

## 2023-08-10 DIAGNOSIS — R51.9 ACUTE HEADACHE: Primary | ICD-10-CM

## 2023-08-10 PROCEDURE — 96374 THER/PROPH/DIAG INJ IV PUSH: CPT

## 2023-08-10 PROCEDURE — 99284 EMERGENCY DEPT VISIT MOD MDM: CPT | Performed by: EMERGENCY MEDICINE

## 2023-08-10 PROCEDURE — 99283 EMERGENCY DEPT VISIT LOW MDM: CPT

## 2023-08-10 PROCEDURE — 96375 TX/PRO/DX INJ NEW DRUG ADDON: CPT

## 2023-08-10 RX ORDER — KETOROLAC TROMETHAMINE 30 MG/ML
15 INJECTION, SOLUTION INTRAMUSCULAR; INTRAVENOUS ONCE
Status: COMPLETED | OUTPATIENT
Start: 2023-08-10 | End: 2023-08-10

## 2023-08-10 RX ORDER — METOCLOPRAMIDE HYDROCHLORIDE 5 MG/ML
10 INJECTION INTRAMUSCULAR; INTRAVENOUS ONCE
Status: COMPLETED | OUTPATIENT
Start: 2023-08-10 | End: 2023-08-10

## 2023-08-10 RX ADMIN — METOCLOPRAMIDE HYDROCHLORIDE 10 MG: 5 INJECTION INTRAMUSCULAR; INTRAVENOUS at 09:41

## 2023-08-10 RX ADMIN — KETOROLAC TROMETHAMINE 15 MG: 30 INJECTION, SOLUTION INTRAMUSCULAR; INTRAVENOUS at 09:39

## 2023-08-10 NOTE — ED PROVIDER NOTES
Emergency Department Note- Jin Richardson 37 y.o. male MRN: [id number]    Unit/Bed#: ED 01 Encounter: [id number]        History of Present Illness     Patient is a 40-year-old male with history of MVP, asthma, says he woke up about 6:30 AM with some Cramping in his right calf which lasted about 20 minutes. There was no numbness, no tingling, no weakness, no other symptoms at that point. It resolved on its own spontaneously, then about 7 AM he noticed the gradual onset of a left-sided headache which feels like a dull somewhat throbbing headache. No trauma, no falls, no difficulty speaking, thinking, or concentrating. No fever or chills. Headache was not maximal in onset, no one else sick with similar symptoms at home, no recent head or neck infections or head or neck surgery. No focal weakness. Worse with nothing and better with nothing.     REVIEW OF SYSTEMS    Positive for the above    Historical Information   Past Medical History:   Diagnosis Date   • Asthma    • Mitral valve prolapse      Past Surgical History:   Procedure Laterality Date   • ROTATOR CUFF REPAIR Bilateral     2020 + 2017     Social History   Social History     Substance and Sexual Activity   Alcohol Use Not Currently    Comment: socially     Social History     Substance and Sexual Activity   Drug Use No     Social History     Tobacco Use   Smoking Status Never   Smokeless Tobacco Never       MEDICATIONS: Celexa 20 mg daily  ALLERGIES:  Allergies   Allergen Reactions   • Aspirin Hives   • Caffeine - Food Allergy Hives   • Penicillins        Vitals:    08/10/23 0853   BP: 130/64   TempSrc: Oral   Pulse: 76   Resp: 15   Patient Position - Orthostatic VS: Lying   Temp: 98.1 °F (36.7 °C)       PHYSICAL EXAM    General:  Patient is well-appearing  Head:  Atraumatic  Eyes:  Conjunctiva pink, Extraocular muscle intact, PERRL  ENT:  Mucous membranes are moist  Neck:  Supple  Cardiac:  S1-S2, without murmurs  Lungs:  Clear to auscultation bilaterally  Abdomen:  Soft, nontender, normal bowel sounds, no CVA tenderness, no tympany, no rigidity, no guarding  Extremities:  Normal range of motion, patient's right calf and leg are visibly atraumatic. No bony tenderness to the hip, femur, knee, tibia, fibula, foot or ankle. No warmth or redness. No calf asymmetry. Neurologic:  Awake, fluent speech, normal comprehension. AAOx3. Cranial nerves 2-12 are intact, strength is 5/5 in the bilateral upper & lower extremities, no slurred speech, no facial droop, no deficit on finger-to-nose testing, no pronator drift. Sensation to light touch is equal and symmetric throughout the whole body  Skin:  Pink warm and dry, no rash  Psychiatric:  Alert, pleasant, cooperative            Labs Reviewed - No data to display    Medications   metoclopramide (REGLAN) injection 10 mg (10 mg Intravenous Given 8/10/23 0970)   ketorolac (TORADOL) injection 15 mg (15 mg Intravenous Given 8/10/23 0939)       No orders to display       ED Course as of 08/10/23 1041   Thu Aug 10, 2023   1039 On reassessment patient said he was feeling much better, no change from the above findings. Assessment/Plan     ED Medical Decision Making: At this point the cause of patient's headache is unclear but unlikely represents an acute emergency. Headache was not maximal in onset, do not believe it is subarachnoid hemorrhage. No fever or chills, exam does not suggest meningitis. No 1 else sick with similar headaches, environmental exposure or carbon monoxide unlikely. No focal neurologic examination deficits, do not believe he requires intracranial imaging at this point. While the cause of the patient's complaints is most likely benign, it is possible that this is the early presentation of a more serious condition.  This diagnostic uncertainty was discussed with the patient, as was the importance of follow up care, as well as the need to return to immediately return to the closest emergency department for the signs/symptoms in the discharge instruction sheets, or they were otherwise concerned about their medical condition. The patient stated they were aware of this diagnostic uncertainty, understood the importance of follow up and were comfortable being discharged. Supportive care, importance of follow-up and return precautions were discussed with the patient, who expressed understanding. MEDICAL DECISION MAKING CODING      Chronic conditions affecting care: As per HPI    COLLECTION AND INTERPRETATION OF DATA  Additional history obtained from: EMS who indicates patient remained hemodynamically stable in route      Tests considered but not ordered: See above    RISK  Drugs (OTC, Rx, Controlled substances): Recommended over-the-counter analgesia as necessary  All of the patient's current prescription medications should be continued. Time reflects when diagnosis was documented in both MDM as applicable and the Disposition within this note     Time User Action Codes Description Comment    8/10/2023 10:41 AM Edgar No Add [R51.9] Acute headache       ED Disposition     ED Disposition   Discharge    Condition   Stable    Date/Time   Thu Aug 10, 2023 10:41 AM    [address] [place] [address] Drive discharge to home/self care.                Follow-up Information     Follow up With Specialties Details Why Contact Mer Boo, [address] [place] [address] Gerontology, Nurse Practitioner Schedule an appointment as soon as possible for a visit in 1 week  [address] [place] [address] [address] [facility]  [phone]            New Prescriptions    No medications on file            Lavon Olsen DO  08/10/23 1042

## 2023-08-10 NOTE — DISCHARGE INSTRUCTIONS
Acute Headache   DISCHARGE INSTRUCTIONS:   Return to the emergency department if:   You have severe pain. You have numbness or weakness on one side of your face or body. You have a headache that occurs after a blow to the head, a fall, or other trauma. You have a headache, are forgetful or confused, or have trouble speaking. You have a headache, stiff neck, and a fever. Contact your healthcare provider if:   You have a constant headache and are vomiting. You have a headache each day that does not get better, even after treatment. You have changes in your headaches, or new symptoms that occur when you have a headache. You have questions or concerns about your condition or care.

## 2023-09-19 ENCOUNTER — TELEPHONE (OUTPATIENT)
Dept: PSYCHIATRY | Facility: CLINIC | Age: 43
End: 2023-09-19

## 2023-09-19 NOTE — TELEPHONE ENCOUNTER
Patient has been added to the Medication Management and Talk Therapy wait list without a referral.    Insurance: [employer] Type:    Commercial []   Medicaid [x]   [place] (if applicable)   Medicare []  Location Preference: [place]  Provider Preference: none  Virtual: Yes [x] No []  Were outside resources sent: Yes [x] No []    Advised the patient to contact  his PCP for a Referral.

## 2023-11-01 ENCOUNTER — HOSPITAL ENCOUNTER (EMERGENCY)
Facility: HOSPITAL | Age: 43
Discharge: HOME | End: 2023-11-01
Attending: EMERGENCY MEDICINE | Admitting: EMERGENCY MEDICINE
Payer: COMMERCIAL

## 2023-11-01 VITALS
SYSTOLIC BLOOD PRESSURE: 146 MMHG | OXYGEN SATURATION: 100 % | DIASTOLIC BLOOD PRESSURE: 81 MMHG | TEMPERATURE: 97.7 F | HEART RATE: 83 BPM | RESPIRATION RATE: 18 BRPM

## 2023-11-01 DIAGNOSIS — K64.4 EXTERNAL HEMORRHOID: Primary | ICD-10-CM

## 2023-11-01 PROCEDURE — 82272 OCCULT BLD FECES 1-3 TESTS: CPT

## 2023-11-01 PROCEDURE — 99283 EMERGENCY DEPT VISIT LOW MDM: CPT | Performed by: PHYSICIAN ASSISTANT

## 2023-11-01 PROCEDURE — 99282 EMERGENCY DEPT VISIT SF MDM: CPT

## 2023-11-01 RX ORDER — DOCUSATE SODIUM 100 MG/1
100 CAPSULE, LIQUID FILLED ORAL 2 TIMES DAILY
Qty: 20 CAPSULE | Refills: 0 | Status: SHIPPED | OUTPATIENT
Start: 2023-11-01 | End: 2023-11-11

## 2023-11-01 RX ORDER — DOCUSATE SODIUM 100 MG/1
100 CAPSULE, LIQUID FILLED ORAL 2 TIMES DAILY
Qty: 20 CAPSULE | Refills: 0 | Status: SHIPPED | OUTPATIENT
Start: 2023-11-01 | End: 2023-11-01 | Stop reason: SDUPTHER

## 2023-11-01 NOTE — Clinical Note
Annalee Santillan was seen and treated in our emergency department on 11/1/2023. No restrictions            Diagnosis:     Sarah Marks  may return to work on return date. He may return on this date: 11/02/2023         If you have any questions or concerns, please don't hesitate to call.       Iker Weber PA-C    ______________________________           _______________          _______________  Hospital Representative                              Date                                Time

## 2023-11-01 NOTE — ED PROVIDER NOTES
History  Chief Complaint   Patient presents with    Hemorrhoids     Pt reports hemorrhoid with increased pain     37yo male who presents to ER for evaluation of hemorrhoid. States he has had them before, however 3 days ago it came back and is larger than before. Admits to straining a little for a hard stool. Denies bleeding. Pain improved today. Denies abdominal pain, however does admit to feeling gassy. Denies previous procedures or treatment for hemorrhoids. States he was supposed to have a colonoscopy this past May however missed the appointment, he called today to reschedule it. History provided by:  Patient      Prior to Admission Medications   Prescriptions Last Dose Informant Patient Reported?  Taking?   acetaminophen (TYLENOL) 500 mg tablet   No No   Sig: Take 1 tablet (500 mg total) by mouth every 6 (six) hours as needed for mild pain or moderate pain   Patient not taking: Reported on 8/25/2022   albuterol (PROVENTIL HFA,VENTOLIN HFA) 90 mcg/act inhaler   Yes No   Sig: Inhale 1 puff every 6 (six) hours as needed   albuterol (PROVENTIL HFA,VENTOLIN HFA) 90 mcg/act inhaler   No No   Sig: Inhale 2 puffs every 4 (four) hours as needed for wheezing   baclofen 10 mg tablet   No No   Sig: Take 1 tablet (10 mg total) by mouth 3 (three) times a day for 4 days   cetirizine (ZyrTEC) 10 mg tablet   No No   Sig: Take 1 tablet (10 mg total) by mouth daily   citalopram (CeleXA) 20 mg tablet   Yes No   Sig: Take 20 mg by mouth daily   Patient not taking: Reported on 8/25/2022   diphenhydrAMINE (BENADRYL) 25 mg tablet   No No   Sig: Take 1 tablet (25 mg total) by mouth every 6 (six) hours   fluticasone-vilanterol (BREO ELLIPTA) 200-25 MCG/INH inhaler   Yes No   Sig: Inhale 1 puff daily   meclizine (ANTIVERT) 25 mg tablet   No No   Sig: Take 1 tablet (25 mg total) by mouth 3 (three) times a day as needed for dizziness   methocarbamol (ROBAXIN) 500 mg tablet   No No   Sig: Take 1 tablet (500 mg total) by mouth 2 (two) times a day as needed for muscle spasms for up to 5 days   naproxen (NAPROSYN) 500 mg tablet   No No   Sig: Take 1 tablet (500 mg total) by mouth 2 (two) times a day with meals   Patient not taking: Reported on 8/25/2022   ondansetron (Zofran ODT) 4 mg disintegrating tablet   No No   Sig: Take 1 tablet (4 mg total) by mouth every 6 (six) hours as needed for nausea or vomiting   Patient not taking: Reported on 8/25/2022   predniSONE 10 mg tablet   Yes No   Sig: Take by mouth   Patient not taking: Reported on 8/25/2022   tiotropium (SPIRIVA RESPIMAT) 1.25 MCG/ACT AERS inhaler   Yes No   Sig: Inhale 2.5 mcg daily   Patient not taking: Reported on 8/25/2022   traZODone (DESYREL) 50 mg tablet   Yes No   Sig: Take 50 mg by mouth   triamcinolone (KENALOG) 0.1 % cream   No No   Sig: Apply topically 2 (two) times a day for 7 days      Facility-Administered Medications: None       Past Medical History:   Diagnosis Date    Asthma     Mitral valve prolapse        Past Surgical History:   Procedure Laterality Date    ROTATOR CUFF REPAIR Bilateral     2020 + 2017       History reviewed. No pertinent family history. I have reviewed and agree with the history as documented. E-Cigarette/Vaping    E-Cigarette Use Never User      E-Cigarette/Vaping Substances    Nicotine No     THC No     CBD No     Flavoring No     Other No     Unknown No      Social History     Tobacco Use    Smoking status: Never    Smokeless tobacco: Never   Vaping Use    Vaping Use: Never used   Substance Use Topics    Alcohol use: Not Currently     Comment: socially    Drug use: No       Review of Systems   Constitutional:  Negative for chills and fever. Respiratory:  Negative for shortness of breath. Cardiovascular:  Negative for chest pain. Gastrointestinal:  Positive for rectal pain. Negative for abdominal pain, anal bleeding, diarrhea and vomiting. Skin:  Negative for rash. All other systems reviewed and are negative.       Physical Exam  Physical Exam  Vitals and nursing note reviewed. Exam conducted with a chaperone present (RN). Constitutional:       Appearance: Normal appearance. He is well-developed. HENT:      Head: Atraumatic. Eyes:      General: No scleral icterus. Pulmonary:      Effort: No respiratory distress. Genitourinary:     Penis: Paraphimosis present. Prostate: Normal.      Rectum: Guaiac result negative. Tenderness and external hemorrhoid (small, soft, easily reduced, no bleeding, minimally thrombosed) present. Normal anal tone. Skin:     General: Skin is warm and dry. Capillary Refill: Capillary refill takes less than 2 seconds. Neurological:      Mental Status: He is alert and oriented to person, place, and time. Psychiatric:         Mood and Affect: Mood normal.         Vital Signs  ED Triage Vitals   Temperature Pulse Respirations Blood Pressure SpO2   11/01/23 1103 11/01/23 1104 11/01/23 1104 11/01/23 1104 11/01/23 1104   97.7 °F (36.5 °C) 83 18 146/81 100 %      Temp Source Heart Rate Source Patient Position - Orthostatic VS BP Location FiO2 (%)   11/01/23 1103 11/01/23 1104 11/01/23 1104 11/01/23 1104 --   Tympanic Monitor Sitting Left arm       Pain Score       11/01/23 1104       2           Vitals:    11/01/23 1104   BP: 146/81   Pulse: 83   Patient Position - Orthostatic VS: Sitting         Visual Acuity      ED Medications  Medications - No data to display    Diagnostic Studies  Results Reviewed       None                   No orders to display              Procedures  Procedures         ED Course                                             Medical Decision Making  Discussed with patient sitz bath, stool softeners, and f/u. Reviewed s/s if worse to return to ER. Risk  OTC drugs. Prescription drug management.              Disposition  Final diagnoses:   External hemorrhoid     Time reflects when diagnosis was documented in both MDM as applicable and the Disposition within this note Time User Action Codes Description Comment    11/1/2023 11:45 AM Rupa Ch Add [K64.4] External hemorrhoid           ED Disposition       ED Disposition   Discharge    Condition   Stable    Date/Time   Wed Nov 1, 2023 11:45 AM    Comment   [facility] discharge to home/self care. Follow-up Information       Follow up With Specialties Details Why Contact Info Additional Information    [facility] Colon [facility] Rectal Surgery In 3 days  [address] [facility] [address] [address] [facility] [address] Emergency Department Emergency Medicine  If symptoms worsen [address] [address]  [facility] Emergency Department, [address], [place], [address] [phone]            Patient's Medications   Discharge Prescriptions    DOCUSATE SODIUM (COLACE) 100 MG CAPSULE    Take 1 capsule (100 mg total) by mouth 2 (two) times a day for 10 days       Start Date: 11/1/2023 End Date: 11/11/2023       Order Dose: 100 mg       Quantity: 20 capsule    Refills: 0    HYDROCORTISONE-PRAMOXINE (PROCTOFOAM-HC) 1-1 % FOAM RECTAL FOAM    Insert 1 applicator into the rectum 2 (two) times a day for 7 days       Start Date: 11/1/2023 End Date: 11/8/2023       Order Dose: 1 applicator       Quantity: 10 g    Refills: 0       No discharge procedures on file.     PDMP Review       None            ED Provider  Electronically Signed by             Radha Mariscal PA-C  11/01/23 9241

## 2024-03-27 ENCOUNTER — HOSPITAL ENCOUNTER (EMERGENCY)
Facility: HOSPITAL | Age: 44
Discharge: HOME | End: 2024-03-27
Attending: EMERGENCY MEDICINE
Payer: COMMERCIAL

## 2024-03-27 VITALS
OXYGEN SATURATION: 98 % | HEART RATE: 99 BPM | SYSTOLIC BLOOD PRESSURE: 134 MMHG | RESPIRATION RATE: 18 BRPM | TEMPERATURE: 97.3 F | DIASTOLIC BLOOD PRESSURE: 65 MMHG

## 2024-03-27 DIAGNOSIS — J06.9 VIRAL URI WITH COUGH: Primary | ICD-10-CM

## 2024-03-27 PROCEDURE — 99284 EMERGENCY DEPT VISIT MOD MDM: CPT | Performed by: EMERGENCY MEDICINE

## 2024-03-27 PROCEDURE — 99283 EMERGENCY DEPT VISIT LOW MDM: CPT

## 2024-03-27 RX ORDER — IBUPROFEN 400 MG/1
400 TABLET ORAL ONCE
Status: COMPLETED | OUTPATIENT
Start: 2024-03-27 | End: 2024-03-27

## 2024-03-27 RX ADMIN — IBUPROFEN 400 MG: 400 TABLET, FILM COATED ORAL at 15:06

## 2024-03-27 NOTE — DISCHARGE INSTRUCTIONS
You have been seen in the emergency department for evaluation of cold-like symptoms.  It appears that this is due to a viral infection.  Please take Tylenol and Motrin as needed for fever and muscle pain.  Please return to the ED if symptoms worsen or if new symptoms arise.  Please follow-up with your PCP.

## 2024-03-27 NOTE — ED ATTENDING ATTESTATION
3/27/2024  I, Maria Del Carmen García MD, saw and evaluated the patient. I have discussed the patient with the resident/non-physician practitioner and agree with the resident's/non-physician practitioner's findings, Plan of Care, and MDM as documented in the resident's/non-physician practitioner's note, except where noted. All available labs and Radiology studies were reviewed.  I was present for key portions of any procedure(s) performed by the resident/non-physician practitioner and I was immediately available to provide assistance.       At this point I agree with the current assessment done in the Emergency Department.  I have conducted an independent evaluation of this patient a history and physical is as follows:    ED Course         Critical Care Time  Procedures    45 yo male with cough for two days, headache and body aches. No fever, no sick contacts.  Pt with no cp, no sob, no sputum production.  No abdominal pain, no n/v/d.  Vss, afebrile, lungs cta, rrr, abdomen soft nontender.  Viral illness, symptom control.

## 2024-03-28 ENCOUNTER — APPOINTMENT (EMERGENCY)
Dept: RADIOLOGY | Facility: HOSPITAL | Age: 44
End: 2024-03-28
Payer: COMMERCIAL

## 2024-03-28 ENCOUNTER — HOSPITAL ENCOUNTER (EMERGENCY)
Facility: HOSPITAL | Age: 44
Discharge: HOME | End: 2024-03-28
Attending: EMERGENCY MEDICINE
Payer: COMMERCIAL

## 2024-03-28 VITALS
SYSTOLIC BLOOD PRESSURE: 131 MMHG | DIASTOLIC BLOOD PRESSURE: 74 MMHG | TEMPERATURE: 99.4 F | HEART RATE: 76 BPM | RESPIRATION RATE: 20 BRPM | OXYGEN SATURATION: 97 %

## 2024-03-28 DIAGNOSIS — J45.909 ASTHMA: ICD-10-CM

## 2024-03-28 DIAGNOSIS — L50.9 URTICARIA: ICD-10-CM

## 2024-03-28 DIAGNOSIS — J06.9 UPPER RESPIRATORY INFECTION: Primary | ICD-10-CM

## 2024-03-28 LAB
ATRIAL RATE: 86 BPM
P AXIS: 71 DEGREES
PR INTERVAL: 168 MS
QRS AXIS: 97 DEGREES
QRSD INTERVAL: 100 MS
QT INTERVAL: 328 MS
QTC INTERVAL: 392 MS
T WAVE AXIS: 3 DEGREES
VENTRICULAR RATE: 86 BPM

## 2024-03-28 PROCEDURE — 93010 ELECTROCARDIOGRAM REPORT: CPT | Performed by: INTERNAL MEDICINE

## 2024-03-28 PROCEDURE — 99285 EMERGENCY DEPT VISIT HI MDM: CPT

## 2024-03-28 PROCEDURE — 93005 ELECTROCARDIOGRAM TRACING: CPT

## 2024-03-28 PROCEDURE — 71046 X-RAY EXAM CHEST 2 VIEWS: CPT

## 2024-03-28 PROCEDURE — 99284 EMERGENCY DEPT VISIT MOD MDM: CPT | Performed by: EMERGENCY MEDICINE

## 2024-03-28 RX ORDER — ACETAMINOPHEN 325 MG/1
650 TABLET ORAL ONCE
Status: COMPLETED | OUTPATIENT
Start: 2024-03-28 | End: 2024-03-28

## 2024-03-28 RX ORDER — LORATADINE 10 MG/1
10 TABLET ORAL ONCE
Status: COMPLETED | OUTPATIENT
Start: 2024-03-28 | End: 2024-03-28

## 2024-03-28 RX ORDER — IPRATROPIUM BROMIDE AND ALBUTEROL SULFATE 2.5; .5 MG/3ML; MG/3ML
3 SOLUTION RESPIRATORY (INHALATION) ONCE
Status: COMPLETED | OUTPATIENT
Start: 2024-03-28 | End: 2024-03-28

## 2024-03-28 RX ORDER — ALBUTEROL SULFATE 2.5 MG/3ML
2.5 SOLUTION RESPIRATORY (INHALATION) EVERY 6 HOURS PRN
Qty: 75 ML | Refills: 0 | Status: SHIPPED | OUTPATIENT
Start: 2024-03-28

## 2024-03-28 RX ADMIN — ACETAMINOPHEN 650 MG: 325 TABLET, FILM COATED ORAL at 13:20

## 2024-03-28 RX ADMIN — LORATADINE 10 MG: 10 TABLET ORAL at 13:20

## 2024-03-28 RX ADMIN — IPRATROPIUM BROMIDE AND ALBUTEROL SULFATE 3 ML: 2.5; .5 SOLUTION RESPIRATORY (INHALATION) at 13:20

## 2024-03-28 NOTE — DISCHARGE INSTRUCTIONS
Your symptoms are likely due to a virus.  It will get better on its own.  You should continue to use your inhaler as needed for wheezing, shortness of breath.  You can use Tylenol as well as an over-the-counter antihistamine during the day such as loratadine or cetirizine and diphenhydramine at night.  You should avoid using aspirin or ibuprofen or naproxen as it looks like you have had a reaction to that in the past that causes a rash.  You should return to the emergency room if you cannot catch your breath even at rest, if you have chest pain that is persistent even after 15 minutes and you are no longer coughing, or if you are not able to stay hydrated.

## 2024-03-28 NOTE — ED PROVIDER NOTES
History  Chief Complaint   Patient presents with    Cough     C/O cough x 2 days, headache and body aches     Patient is a 44-year-old male, past medical history of asthma presenting to the ED for evaluation of cold-like symptoms. Patient states that over the past 2 days he has had cough, body aches, headache, and chills. Patient has been around daughter who has been sick with similar symptoms. Denies any difficulty breathing or productivity of cough. Denies chest pain, has not been wheezing throughout course of illness. Patient denies any risk factors for HIV or otherwise immunosuppression.  Denies any vomiting or diarrhea.  Patient has been tolerating p.o. and having normal urine output.          Prior to Admission Medications   Prescriptions Last Dose Informant Patient Reported? Taking?   acetaminophen (TYLENOL) 500 mg tablet   No No   Sig: Take 1 tablet (500 mg total) by mouth every 6 (six) hours as needed for mild pain or moderate pain   Patient not taking: Reported on 8/25/2022   albuterol (PROVENTIL HFA,VENTOLIN HFA) 90 mcg/act inhaler   Yes No   Sig: Inhale 1 puff every 6 (six) hours as needed   albuterol (PROVENTIL HFA,VENTOLIN HFA) 90 mcg/act inhaler   No No   Sig: Inhale 2 puffs every 4 (four) hours as needed for wheezing   baclofen 10 mg tablet   No No   Sig: Take 1 tablet (10 mg total) by mouth 3 (three) times a day for 4 days   cetirizine (ZyrTEC) 10 mg tablet   No No   Sig: Take 1 tablet (10 mg total) by mouth daily   citalopram (CeleXA) 20 mg tablet   Yes No   Sig: Take 20 mg by mouth daily   Patient not taking: Reported on 8/25/2022   diphenhydrAMINE (BENADRYL) 25 mg tablet   No No   Sig: Take 1 tablet (25 mg total) by mouth every 6 (six) hours   docusate sodium (Colace) 100 mg capsule   No No   Sig: Take 1 capsule (100 mg total) by mouth 2 (two) times a day for 10 days   fluticasone-vilanterol (BREO ELLIPTA) 200-25 MCG/INH inhaler   Yes No   Sig: Inhale 1 puff daily   hydrocortisone-pramoxine  (PROCTOFOAM-HC) 1-1 % FOAM rectal foam   No No   Sig: Insert 1 applicator into the rectum 2 (two) times a day for 7 days   meclizine (ANTIVERT) 25 mg tablet   No No   Sig: Take 1 tablet (25 mg total) by mouth 3 (three) times a day as needed for dizziness   methocarbamol (ROBAXIN) 500 mg tablet   No No   Sig: Take 1 tablet (500 mg total) by mouth 2 (two) times a day as needed for muscle spasms for up to 5 days   naproxen (NAPROSYN) 500 mg tablet   No No   Sig: Take 1 tablet (500 mg total) by mouth 2 (two) times a day with meals   Patient not taking: Reported on 8/25/2022   ondansetron (Zofran ODT) 4 mg disintegrating tablet   No No   Sig: Take 1 tablet (4 mg total) by mouth every 6 (six) hours as needed for nausea or vomiting   Patient not taking: Reported on 8/25/2022   predniSONE 10 mg tablet   Yes No   Sig: Take by mouth   Patient not taking: Reported on 8/25/2022   tiotropium (SPIRIVA RESPIMAT) 1.25 MCG/ACT AERS inhaler   Yes No   Sig: Inhale 2.5 mcg daily   Patient not taking: Reported on 8/25/2022   traZODone (DESYREL) 50 mg tablet   Yes No   Sig: Take 50 mg by mouth   triamcinolone (KENALOG) 0.1 % cream   No No   Sig: Apply topically 2 (two) times a day for 7 days      Facility-Administered Medications: None       Past Medical History:   Diagnosis Date    Asthma     Mitral valve prolapse        Past Surgical History:   Procedure Laterality Date    ROTATOR CUFF REPAIR Bilateral     2020 + 2017       History reviewed. No pertinent family history.  I have reviewed and agree with the history as documented.    E-Cigarette/Vaping    E-Cigarette Use Never User      E-Cigarette/Vaping Substances    Nicotine No     THC No     CBD No     Flavoring No     Other No     Unknown No      Social History     Tobacco Use    Smoking status: Never    Smokeless tobacco: Never   Vaping Use    Vaping status: Never Used   Substance Use Topics    Alcohol use: Not Currently     Comment: socially    Drug use: No        Review of Systems    Constitutional:  Positive for chills. Negative for fatigue and fever.   HENT:  Positive for congestion and rhinorrhea.    Respiratory:  Positive for cough. Negative for chest tightness and shortness of breath.    Cardiovascular:  Negative for chest pain.   Gastrointestinal:  Negative for abdominal pain, diarrhea, nausea and vomiting.   Genitourinary:  Negative for difficulty urinating.   Musculoskeletal:  Positive for myalgias.   Skin:  Negative for color change.   Neurological:  Positive for headaches. Negative for dizziness, syncope, weakness and numbness.       Physical Exam  ED Triage Vitals [03/27/24 1415]   Temperature Pulse Respirations Blood Pressure SpO2   (!) 97.3 °F (36.3 °C) 99 18 134/65 98 %      Temp Source Heart Rate Source Patient Position - Orthostatic VS BP Location FiO2 (%)   Tympanic -- -- -- --      Pain Score       --             Orthostatic Vital Signs  Vitals:    03/27/24 1415   BP: 134/65   Pulse: 99       Physical Exam  Vitals and nursing note reviewed.   Constitutional:       General: He is not in acute distress.     Appearance: Normal appearance. He is not ill-appearing or toxic-appearing.   HENT:      Head: Normocephalic and atraumatic.      Nose: Nose normal.      Mouth/Throat:      Mouth: Mucous membranes are moist.      Pharynx: No oropharyngeal exudate or posterior oropharyngeal erythema.   Eyes:      General: No scleral icterus.     Extraocular Movements: Extraocular movements intact.   Cardiovascular:      Rate and Rhythm: Normal rate and regular rhythm.      Pulses: Normal pulses.      Heart sounds: Normal heart sounds. No murmur heard.  Pulmonary:      Effort: Pulmonary effort is normal. No respiratory distress.      Breath sounds: Normal breath sounds. No wheezing or rhonchi.   Abdominal:      General: Abdomen is flat. There is no distension.      Palpations: Abdomen is soft.      Tenderness: There is no abdominal tenderness.   Musculoskeletal:         General: No swelling or  tenderness. Normal range of motion.      Cervical back: Normal range of motion.      Right lower leg: No edema.      Left lower leg: No edema.   Skin:     General: Skin is warm.      Capillary Refill: Capillary refill takes less than 2 seconds.   Neurological:      General: No focal deficit present.      Mental Status: He is alert.      Cranial Nerves: No cranial nerve deficit.      Sensory: No sensory deficit.      Motor: No weakness.      Gait: Gait normal.   Psychiatric:         Mood and Affect: Mood normal.         Behavior: Behavior normal.         ED Medications  Medications   ibuprofen (MOTRIN) tablet 400 mg (400 mg Oral Given 3/27/24 1506)       Diagnostic Studies  Results Reviewed       None                   No orders to display         Procedures  Procedures      ED Course  ED Course as of 03/28/24 1540   Wed Mar 27, 2024   1440 Patient seen and evaluated by me  Ddx: Viral syndrome. No suspicion for pneumonia or otherwise bacterial infection at this time. No suspicion for asthma exacerbation or need for breathing treatments.  Workup and plan: Motrin, discharge with reassurance   1501 Patient discharged at this time, given return precautions and follow-up information.  Patient reports understanding, questions answered.                             SBIRT 22yo+      Flowsheet Row Most Recent Value   Initial Alcohol Screen: US AUDIT-C     1. How often do you have a drink containing alcohol? 0 Filed at: 03/27/2024 1416   2. How many drinks containing alcohol do you have on a typical day you are drinking?  0 Filed at: 03/27/2024 1416   3a. Male UNDER 65: How often do you have five or more drinks on one occasion? 0 Filed at: 03/27/2024 1416   Audit-C Score 0 Filed at: 03/27/2024 1416   NELSON: How many times in the past year have you...    Used an illegal drug or used a prescription medication for non-medical reasons? Never Filed at: 03/27/2024 1416                  Medical Decision Making  Please see ED course  above regarding details of the MDM    Risk  Prescription drug management.          Disposition  Final diagnoses:   Viral URI with cough     Time reflects when diagnosis was documented in both MDM as applicable and the Disposition within this note       Time User Action Codes Description Comment    3/27/2024  3:00 PM Madiha Guerra Add [J06.9] Viral URI with cough           ED Disposition       ED Disposition   Discharge    Condition   Stable    Date/Time   Wed Mar 27, 2024 1500    Comment   Jose CapoReyes discharge to home/self care.                   Follow-up Information       Follow up With Specialties Details Why Contact Info    [facility], Nurse Practitioner   [address] [address]  [phone]              Discharge Medication List as of 3/27/2024  3:01 PM        CONTINUE these medications which have NOT CHANGED    Details   acetaminophen (TYLENOL) 500 mg tablet Take 1 tablet (500 mg total) by mouth every 6 (six) hours as needed for mild pain or moderate pain, Starting Tue 7/30/2019, Print      !! albuterol (PROVENTIL HFA,VENTOLIN HFA) 90 mcg/act inhaler Inhale 1 puff every 6 (six) hours as needed, Starting Mon 3/11/2019, Historical Med      !! albuterol (PROVENTIL HFA,VENTOLIN HFA) 90 mcg/act inhaler Inhale 2 puffs every 4 (four) hours as needed for wheezing, Starting Sat 4/17/2021, Normal      baclofen 10 mg tablet Take 1 tablet (10 mg total) by mouth 3 (three) times a day for 4 days, Starting Wed 9/25/2019, Until Sun 9/29/2019, Print      cetirizine (ZyrTEC) 10 mg tablet Take 1 tablet (10 mg total) by mouth daily, Starting Fri 12/23/2022, Normal      citalopram (CeleXA) 20 mg tablet Take 20 mg by mouth daily, Historical Med      diphenhydrAMINE (BENADRYL) 25 mg tablet Take 1 tablet (25 mg total) by mouth every 6 (six) hours, Starting Fri 12/23/2022, Normal      docusate sodium (Colace) 100 mg capsule Take 1 capsule (100 mg total) by mouth 2 (two) times a day  for 10 days, Starting Wed 11/1/2023, Until Sat 11/11/2023, Normal      fluticasone-vilanterol (BREO ELLIPTA) 200-25 MCG/INH inhaler Inhale 1 puff daily, Starting Mon 8/20/2018, Historical Med      hydrocortisone-pramoxine (PROCTOFOAM-HC) 1-1 % FOAM rectal foam Insert 1 applicator into the rectum 2 (two) times a day for 7 days, Starting Wed 11/1/2023, Until Wed 11/8/2023, Normal      meclizine (ANTIVERT) 25 mg tablet Take 1 tablet (25 mg total) by mouth 3 (three) times a day as needed for dizziness, Starting Wed 6/15/2022, Normal      methocarbamol (ROBAXIN) 500 mg tablet Take 1 tablet (500 mg total) by mouth 2 (two) times a day as needed for muscle spasms for up to 5 days, Starting Sat 12/10/2022, Until Thu 12/15/2022 at 2359, Normal      naproxen (NAPROSYN) 500 mg tablet Take 1 tablet (500 mg total) by mouth 2 (two) times a day with meals, Starting Wed 9/30/2020, Normal      ondansetron (Zofran ODT) 4 mg disintegrating tablet Take 1 tablet (4 mg total) by mouth every 6 (six) hours as needed for nausea or vomiting, Starting Wed 6/15/2022, Normal      predniSONE 10 mg tablet Take by mouth, Starting Wed 8/15/2012, Historical Med      tiotropium (SPIRIVA RESPIMAT) 1.25 MCG/ACT AERS inhaler Inhale 2.5 mcg daily, Starting Mon 8/20/2018, Historical Med      traZODone (DESYREL) 50 mg tablet Take 50 mg by mouth, Historical Med      triamcinolone (KENALOG) 0.1 % cream Apply topically 2 (two) times a day for 7 days, Starting Sun 10/10/2021, Until Sun 10/17/2021, Normal       !! - Potential duplicate medications found. Please discuss with provider.        No discharge procedures on file.    PDMP Review       None             ED Provider  Attending physically available and evaluated Jose CapoReyes. I managed the patient along with the ED Attending.    Electronically Signed by           Madiha Guerra MD  03/28/24 5618

## 2024-03-28 NOTE — Clinical Note
Jose CapoReyes was seen and treated in our emergency department on 3/28/2024.    ?    ?    ?    Diagnosis: ?    Albert  may return to work on return date.    He may return on this date: 03/29/2024    ?     If you have any questions or concerns, please don't hesitate to call.      Andres Herrera, DO    ______________________________           _______________          _______________  Hospital Representative                              Date                                Time

## 2024-03-28 NOTE — ED PROVIDER NOTES
History  Chief Complaint   Patient presents with    Chest Pain     Chest pain started today at 0700 mainly when coughing. Was here yesterday for cough/congestion and rash     HPI  44-year-old male with history of intermittent asthma presents to the ED for evaluation of chest congestion and pain that started when he woke up this morning.  He states that he had significant coughing when he woke up and had associated mid chest pain.  The chest pain resolved after a little while of coughing.  He also notes that the rash that he had yesterday has gotten a little worse.  It remains pruritic but not painful and there are no open areas or areas of draining.  He has been eating and drinking normally.  He denies any new soaps, detergents.  He has no known exposure or risk factors for exposure to poison ivy.  He denies fevers, chills, dizziness, current chest pain, current shortness of breath, abdominal pain, nausea, vomiting, diarrhea, decreased urine output.    Prior to Admission Medications   Prescriptions Last Dose Informant Patient Reported? Taking?   acetaminophen (TYLENOL) 500 mg tablet   No No   Sig: Take 1 tablet (500 mg total) by mouth every 6 (six) hours as needed for mild pain or moderate pain   Patient not taking: Reported on 8/25/2022   albuterol (PROVENTIL HFA,VENTOLIN HFA) 90 mcg/act inhaler   Yes No   Sig: Inhale 1 puff every 6 (six) hours as needed   albuterol (PROVENTIL HFA,VENTOLIN HFA) 90 mcg/act inhaler   No No   Sig: Inhale 2 puffs every 4 (four) hours as needed for wheezing   baclofen 10 mg tablet   No No   Sig: Take 1 tablet (10 mg total) by mouth 3 (three) times a day for 4 days   cetirizine (ZyrTEC) 10 mg tablet   No No   Sig: Take 1 tablet (10 mg total) by mouth daily   citalopram (CeleXA) 20 mg tablet   Yes No   Sig: Take 20 mg by mouth daily   Patient not taking: Reported on 8/25/2022   diphenhydrAMINE (BENADRYL) 25 mg tablet   No No   Sig: Take 1 tablet (25 mg total) by mouth every 6 (six) hours    docusate sodium (Colace) 100 mg capsule   No No   Sig: Take 1 capsule (100 mg total) by mouth 2 (two) times a day for 10 days   fluticasone-vilanterol (BREO ELLIPTA) 200-25 MCG/INH inhaler   Yes No   Sig: Inhale 1 puff daily   hydrocortisone-pramoxine (PROCTOFOAM-HC) 1-1 % FOAM rectal foam   No No   Sig: Insert 1 applicator into the rectum 2 (two) times a day for 7 days   meclizine (ANTIVERT) 25 mg tablet   No No   Sig: Take 1 tablet (25 mg total) by mouth 3 (three) times a day as needed for dizziness   methocarbamol (ROBAXIN) 500 mg tablet   No No   Sig: Take 1 tablet (500 mg total) by mouth 2 (two) times a day as needed for muscle spasms for up to 5 days   naproxen (NAPROSYN) 500 mg tablet   No No   Sig: Take 1 tablet (500 mg total) by mouth 2 (two) times a day with meals   Patient not taking: Reported on 8/25/2022   ondansetron (Zofran ODT) 4 mg disintegrating tablet   No No   Sig: Take 1 tablet (4 mg total) by mouth every 6 (six) hours as needed for nausea or vomiting   Patient not taking: Reported on 8/25/2022   predniSONE 10 mg tablet   Yes No   Sig: Take by mouth   Patient not taking: Reported on 8/25/2022   tiotropium (SPIRIVA RESPIMAT) 1.25 MCG/ACT AERS inhaler   Yes No   Sig: Inhale 2.5 mcg daily   Patient not taking: Reported on 8/25/2022   traZODone (DESYREL) 50 mg tablet   Yes No   Sig: Take 50 mg by mouth   triamcinolone (KENALOG) 0.1 % cream   No No   Sig: Apply topically 2 (two) times a day for 7 days      Facility-Administered Medications: None       Past Medical History:   Diagnosis Date    Asthma     Mitral valve prolapse        Past Surgical History:   Procedure Laterality Date    ROTATOR CUFF REPAIR Bilateral     2020 + 2017       History reviewed. No pertinent family history.  I have reviewed and agree with the history as documented.    E-Cigarette/Vaping    E-Cigarette Use Never User      E-Cigarette/Vaping Substances    Nicotine No     THC No     CBD No     Flavoring No     Other No      Unknown No      Social History     Tobacco Use    Smoking status: Never    Smokeless tobacco: Never   Vaping Use    Vaping status: Never Used   Substance Use Topics    Alcohol use: Not Currently     Comment: socially    Drug use: No        Review of Systems  See HPI    Physical Exam  ED Triage Vitals   Temperature Pulse Respirations Blood Pressure SpO2   03/28/24 1244 03/28/24 1244 03/28/24 1244 03/28/24 1244 03/28/24 1244   99.4 °F (37.4 °C) 74 16 149/71 94 %      Temp Source Heart Rate Source Patient Position - Orthostatic VS BP Location FiO2 (%)   03/28/24 1244 03/28/24 1244 03/28/24 1244 03/28/24 1244 --   Oral Monitor Lying Left arm       Pain Score       03/28/24 1320       3             Orthostatic Vital Signs  Vitals:    03/28/24 1244   BP: 149/71   Pulse: 74   Patient Position - Orthostatic VS: Lying       Physical Exam  Constitutional:       Appearance: Normal appearance.   HENT:      Head: Normocephalic and atraumatic.      Nose: Congestion and rhinorrhea present.      Mouth/Throat:      Mouth: Mucous membranes are moist.      Pharynx: Oropharynx is clear.   Eyes:      Extraocular Movements: Extraocular movements intact.      Conjunctiva/sclera: Conjunctivae normal.   Cardiovascular:      Rate and Rhythm: Normal rate and regular rhythm.      Pulses: Normal pulses.      Heart sounds: Normal heart sounds.   Pulmonary:      Effort: Pulmonary effort is normal.      Breath sounds: Normal breath sounds.   Abdominal:      General: There is no distension.      Palpations: Abdomen is soft.      Tenderness: There is no abdominal tenderness.   Musculoskeletal:      Cervical back: Normal range of motion and neck supple.      Right lower leg: No edema.      Left lower leg: No edema.   Skin:     General: Skin is warm and dry.      Capillary Refill: Capillary refill takes less than 2 seconds.      Findings: Rash (Urticarial rash of the left upper extremity and left lower extremity without excess warmth, tenderness,  edema) present.   Neurological:      General: No focal deficit present.      Mental Status: He is alert and oriented to person, place, and time.   Psychiatric:         Mood and Affect: Mood normal.         Behavior: Behavior normal.         Thought Content: Thought content normal.         ED Medications  Medications   ipratropium-albuterol (DUO-NEB) 0.5-2.5 mg/3 mL inhalation solution 3 mL (3 mL Nebulization Given 3/28/24 1320)   acetaminophen (TYLENOL) tablet 650 mg (650 mg Oral Given 3/28/24 1320)   loratadine (CLARITIN) tablet 10 mg (10 mg Oral Given 3/28/24 1320)       Diagnostic Studies  Results Reviewed       None                   XR chest 2 views    (Results Pending)         Procedures  Procedures      ED Course                                       Medical Decision Making  44-year-old male with chest pain this morning after waking up and having coughing fit.  He denies fevers, chills, dizziness, current chest pain, current shortness of breath, abdominal pain, nausea, vomiting, diarrhea, decreased urine output. Patient with normal VS on presentation. Appears well and NAD. HEENT significant for nasal congestion and rhinorrhea but with moist mucous membranes. Lungs CTA with good air movement. Heart sounds normal with RRR. Abdominal exam benign. Normal cap refill and equal pulses. No LE edema.  There is an urticarial type rash of the left upper proximal extremity and left lower proximal extremity without excess warmth, tenderness, edema and no drainage noted.  Concern for viral illness with chest congestion.  Patient's lungs do sound clear at this time so I do not believe patient is in acute exacerbation of his asthma.  Rash could represent viral exanthem or reaction to ibuprofen that was given yesterday in the ED given that patient has an allergy to aspirin, however, patient reported the rash started prior to this.  I will get chest x-ray, and treat with loratadine and inhaled bronchodilators.    Chest x-ray  shows viral appearance.  Patient is feeling much better after nebulizer treatment.  I discussed reassuring workup and likely diagnosis of viral URI and plans for supportive treatment.  I also told patient I would prescribe nebulizer solution for him as he states that he has a machine at home.  I also discussed that he should follow-up with his primary care doctor in 48 hours if he has no improvement in his symptoms. Patient voiced understanding of the plan and all questions were answered. Strict return precautions given. Patient is hemodynamically stable and safe for discharge at this time.    Amount and/or Complexity of Data Reviewed  Radiology: ordered.    Risk  OTC drugs.  Prescription drug management.          Disposition  Final diagnoses:   Upper respiratory infection   Urticaria   Asthma     Time reflects when diagnosis was documented in both MDM as applicable and the Disposition within this note       Time User Action Codes Description Comment    3/28/2024  1:39 PM Andres Herrera [J06.9] Upper respiratory infection     3/28/2024  1:39 PM Andres Herrera [L50.9] Urticaria     3/28/2024  1:51 PM Andres Herrera [J45.909] Asthma           ED Disposition       ED Disposition   Discharge    Condition   Stable    Date/Time   Thu Mar 28, 2024  1:37 PM    Comment   Jose CapoReyes discharge to home/self care.                   Follow-up Information    None         Patient's Medications   Discharge Prescriptions    ALBUTEROL (2.5 MG/3 ML) 0.083 % NEBULIZER SOLUTION    Take 3 mL (2.5 mg total) by nebulization every 6 (six) hours as needed for wheezing or shortness of breath       Start Date: 3/28/2024 End Date: --       Order Dose: 2.5 mg       Quantity: 75 mL    Refills: 0     No discharge procedures on file.    PDMP Review       None             ED Provider  Attending physically available and evaluated Jose CapoReyes. I managed the patient along with the ED Attending.    Electronically Signed  by           Andres Herrera, DO  03/28/24 0886

## 2024-04-01 NOTE — ED ATTENDING ATTESTATION
3/28/2024  I, Romeo Gomez MD, saw and evaluated the patient. I have discussed the patient with the resident/non-physician practitioner and agree with the resident's/non-physician practitioner's findings, Plan of Care, and MDM as documented in the resident's/non-physician practitioner's note, except where noted. All available labs and Radiology studies were reviewed.  I was present for key portions of any procedure(s) performed by the resident/non-physician practitioner and I was immediately available to provide assistance.       At this point I agree with the current assessment done in the Emergency Department.  I have conducted an independent evaluation of this patient a history and physical is as follows:    ED Course     44-year-old male presents with cough and pleuritic chest pain since 0700 hrs. yesterday.  Associated symptoms include rash.    Vitals reviewed.  Heart regular rate and rhythm without murmurs.  Lungs clear to auscultation bilaterally.  Abdomen soft nontender nondistended normal bowel sounds.  Extremities no edema.  Patient notes to have mild congestion and rhinorrhea present with urticarial rash over left upper extremity.    Impression: Cough rash  Differential diagnosis: Bronchitis, viral syndrome, pneumonia, allergic rhinitis, URI. doubt anaphylaxis    Plan to give trial bronchodilators Tylenol loratadine reassess will check chest x-ray to exclude pneumonia    Chest x-ray independently interpreted by me no acute cardiopulmonary disease.  Patient be discharged home follow-up PCP as outpatient return precautions given.  Critical Care Time  Procedures

## 2024-05-04 ENCOUNTER — HOSPITAL ENCOUNTER (EMERGENCY)
Facility: HOSPITAL | Age: 44
Discharge: HOME | End: 2024-05-05
Attending: EMERGENCY MEDICINE
Payer: COMMERCIAL

## 2024-05-04 ENCOUNTER — APPOINTMENT (EMERGENCY)
Dept: RADIOLOGY | Facility: HOSPITAL | Age: 44
End: 2024-05-04
Payer: COMMERCIAL

## 2024-05-04 VITALS
DIASTOLIC BLOOD PRESSURE: 70 MMHG | OXYGEN SATURATION: 98 % | RESPIRATION RATE: 20 BRPM | TEMPERATURE: 98.2 F | HEART RATE: 100 BPM | SYSTOLIC BLOOD PRESSURE: 158 MMHG

## 2024-05-04 DIAGNOSIS — R00.2 PALPITATIONS: Primary | ICD-10-CM

## 2024-05-04 LAB
ANION GAP SERPL CALCULATED.3IONS-SCNC: 7 MMOL/L (ref 4–13)
BASOPHILS # BLD AUTO: 0.05 THOUSANDS/ÂΜL (ref 0–0.1)
BASOPHILS NFR BLD AUTO: 1 % (ref 0–1)
BUN SERPL-MCNC: 10 MG/DL (ref 5–25)
CALCIUM SERPL-MCNC: 9.5 MG/DL (ref 8.4–10.2)
CARDIAC TROPONIN I PNL SERPL HS: 4 NG/L
CHLORIDE SERPL-SCNC: 104 MMOL/L (ref 96–108)
CO2 SERPL-SCNC: 27 MMOL/L (ref 21–32)
CREAT SERPL-MCNC: 0.76 MG/DL (ref 0.6–1.3)
EOSINOPHIL # BLD AUTO: 0.17 THOUSAND/ÂΜL (ref 0–0.61)
EOSINOPHIL NFR BLD AUTO: 3 % (ref 0–6)
ERYTHROCYTE [DISTWIDTH] IN BLOOD BY AUTOMATED COUNT: 12.2 % (ref 11.6–15.1)
GFR SERPL CREATININE-BSD FRML MDRD: 109 ML/MIN/1.73SQ M
GLUCOSE SERPL-MCNC: 107 MG/DL (ref 65–140)
HCT VFR BLD AUTO: 42.7 % (ref 36.5–49.3)
HGB BLD-MCNC: 14.6 G/DL (ref 12–17)
IMM GRANULOCYTES # BLD AUTO: 0 THOUSAND/UL (ref 0–0.2)
IMM GRANULOCYTES NFR BLD AUTO: 0 % (ref 0–2)
LYMPHOCYTES # BLD AUTO: 2.33 THOUSANDS/ÂΜL (ref 0.6–4.47)
LYMPHOCYTES NFR BLD AUTO: 36 % (ref 14–44)
MAGNESIUM SERPL-MCNC: 2.1 MG/DL (ref 1.9–2.7)
MCH RBC QN AUTO: 32.2 PG (ref 26.8–34.3)
MCHC RBC AUTO-ENTMCNC: 34.2 G/DL (ref 31.4–37.4)
MCV RBC AUTO: 94 FL (ref 82–98)
MONOCYTES # BLD AUTO: 0.61 THOUSAND/ÂΜL (ref 0.17–1.22)
MONOCYTES NFR BLD AUTO: 10 % (ref 4–12)
NEUTROPHILS # BLD AUTO: 3.28 THOUSANDS/ÂΜL (ref 1.85–7.62)
NEUTS SEG NFR BLD AUTO: 50 % (ref 43–75)
NRBC BLD AUTO-RTO: 0 /100 WBCS
PLATELET # BLD AUTO: 305 THOUSANDS/UL (ref 149–390)
PMV BLD AUTO: 9.8 FL (ref 8.9–12.7)
POTASSIUM SERPL-SCNC: 3.5 MMOL/L (ref 3.5–5.3)
RBC # BLD AUTO: 4.53 MILLION/UL (ref 3.88–5.62)
SODIUM SERPL-SCNC: 138 MMOL/L (ref 135–147)
WBC # BLD AUTO: 6.44 THOUSAND/UL (ref 4.31–10.16)

## 2024-05-04 PROCEDURE — 80048 BASIC METABOLIC PNL TOTAL CA: CPT

## 2024-05-04 PROCEDURE — 36415 COLL VENOUS BLD VENIPUNCTURE: CPT

## 2024-05-04 PROCEDURE — 85025 COMPLETE CBC W/AUTO DIFF WBC: CPT

## 2024-05-04 PROCEDURE — 99285 EMERGENCY DEPT VISIT HI MDM: CPT | Performed by: EMERGENCY MEDICINE

## 2024-05-04 PROCEDURE — 71045 X-RAY EXAM CHEST 1 VIEW: CPT

## 2024-05-04 PROCEDURE — 83735 ASSAY OF MAGNESIUM: CPT

## 2024-05-04 PROCEDURE — 96361 HYDRATE IV INFUSION ADD-ON: CPT

## 2024-05-04 PROCEDURE — 84484 ASSAY OF TROPONIN QUANT: CPT

## 2024-05-04 PROCEDURE — 99285 EMERGENCY DEPT VISIT HI MDM: CPT

## 2024-05-04 PROCEDURE — 93005 ELECTROCARDIOGRAM TRACING: CPT

## 2024-05-04 PROCEDURE — 96374 THER/PROPH/DIAG INJ IV PUSH: CPT

## 2024-05-04 RX ORDER — KETOROLAC TROMETHAMINE 30 MG/ML
15 INJECTION, SOLUTION INTRAMUSCULAR; INTRAVENOUS ONCE
Status: COMPLETED | OUTPATIENT
Start: 2024-05-04 | End: 2024-05-04

## 2024-05-04 RX ORDER — SODIUM CHLORIDE 9 MG/ML
3 INJECTION INTRAVENOUS
Status: DISCONTINUED | OUTPATIENT
Start: 2024-05-04 | End: 2024-05-05 | Stop reason: HOSPADM

## 2024-05-04 RX ADMIN — KETOROLAC TROMETHAMINE 15 MG: 30 INJECTION, SOLUTION INTRAMUSCULAR; INTRAVENOUS at 23:00

## 2024-05-04 RX ADMIN — SODIUM CHLORIDE 1000 ML: 0.9 INJECTION, SOLUTION INTRAVENOUS at 23:59

## 2024-05-05 LAB
ATRIAL RATE: 77 BPM
P AXIS: 61 DEGREES
PR INTERVAL: 170 MS
QRS AXIS: 65 DEGREES
QRSD INTERVAL: 112 MS
QT INTERVAL: 350 MS
QTC INTERVAL: 396 MS
T WAVE AXIS: -30 DEGREES
VENTRICULAR RATE: 77 BPM

## 2024-05-05 PROCEDURE — 93010 ELECTROCARDIOGRAM REPORT: CPT | Performed by: INTERNAL MEDICINE

## 2024-05-05 NOTE — ED ATTENDING ATTESTATION
Plan: The rapid strep was negative, the strep culture is pending, we will call you with results in 24-48 hours and treat with antibiotics as needed  You're mono was negative, the rest of your labs were fairly benign.   For pain:   Warm salt water gargles several times per day  Ibuprofen and tylenol per package directions for pain/fever  Cepacol lozenges OTC per package directions  Increase fluids, wash hands frequently, rest  Go to ED if symptoms worsen.   Follow up with your Primary Care provider if symptoms do not improve in 2-3 days      When You Have a Sore Throat    A sore throat can be painful. There are many reasons why you may have a sore throat. Your healthcare provider will work with you to find the cause of your sore throat. He or she will also find the best treatment for you.  What causes a sore throat?  Sore throats can be caused or worsened by:    Cold or flu viruses    Bacteria    Irritants such as tobacco smoke or air pollution    Acid reflux  A healthy throat  The tonsils are on the sides of the throat near the base of the tongue. They collect viruses and bacteria and help fight infection. The throat (pharynx) is the passage for air. Mucus from the nasal cavity also moves down the passage.  An inflamed throat  The tonsils and pharynx can become inflamed due to a cold or flu virus. Postnasal drip (excess mucus draining from the nasal cavity) can irritate the throat. It can also make the throat or tonsils more likely to be infected by bacteria. Severe, untreated tonsillitis in children or adults can cause a pocket of pus (abscess) to form near the tonsil.  Your evaluation  A medical evaluation can help find the cause of your sore throat. It can also help your healthcare provider choose the best treatment for you. The evaluation may include a health history, physical exam, and diagnostic tests.  Health history  Your healthcare provider may ask you the following:    How long has the sore throat lasted  5/4/2024  I, Asad Carrasco MD, saw and evaluated the patient. I have discussed the patient with the resident/non-physician practitioner and agree with the resident's/non-physician practitioner's findings, Plan of Care, and MDM as documented in the resident's/non-physician practitioner's note, except where noted. All available labs and Radiology studies were reviewed.  I was present for key portions of any procedure(s) performed by the resident/non-physician practitioner and I was immediately available to provide assistance.       At this point I agree with the current assessment done in the Emergency Department.  I have conducted an independent evaluation of this patient a history and physical is as follows:    ED Course  ED Course as of 05/04/24 2331   Sat May 04, 2024   2301 Per resident h&p 45 YO M presents for palpitations and chest squeezing; no dyspnea; no chest tightness; no exertional component; no nausea; I/P Tn ECG; monitoring;      Emergency Department Note- Jose CapoReyes 44 y.o. male MRN: [id number]    Unit/Bed#: ED 23 Encounter: [id number]    Jose CapoReyes is a 44 y.o. male who presents with   Chief Complaint   Patient presents with    Palpitations     Pt presents ambulatory with c/o palpitations and headache all day         History of Present Illness   HPI:  Jose CapoReyes is a 44 y.o. male who presents for evaluation of:  Palpitations a throughout the day.  The palpitations not related to movement; they are not improved by rest.  He denies associated chest tightness.  He has had occasional chest squeezing sensation.  He has a cardiac history notable for a mitral valve prolapse murmur.  He has no history of coronary artery disease.  He denies associated diaphoresis, nausea, dyspnea, and posterior thoracic pain.    Review of Systems   Constitutional:  Negative for fatigue and fever.   HENT:  Negative for congestion and sore throat.    Respiratory:  Negative for cough and shortness of breath.     Cardiovascular:  Positive for palpitations. Negative for chest pain.   Gastrointestinal:  Negative for abdominal pain and nausea.   Genitourinary:  Negative for flank pain and frequency.   Neurological:  Negative for light-headedness and headaches.   Psychiatric/Behavioral:  Negative for dysphoric mood and hallucinations.    All other systems reviewed and are negative.      Historical Information   Past Medical History:   Diagnosis Date    Asthma     Mitral valve prolapse      Past Surgical History:   Procedure Laterality Date    ROTATOR CUFF REPAIR Bilateral     2020 + 2017     Social History   Social History     Substance and Sexual Activity   Alcohol Use Not Currently    Comment: socially     Social History     Substance and Sexual Activity   Drug Use No     Social History     Tobacco Use   Smoking Status Never   Smokeless Tobacco Never     Family History: History reviewed. No pertinent family history.    Meds/Allergies   PTA meds:   Prior to Admission Medications   Prescriptions Last Dose Informant Patient Reported? Taking?   acetaminophen (TYLENOL) 500 mg tablet   No No   Sig: Take 1 tablet (500 mg total) by mouth every 6 (six) hours as needed for mild pain or moderate pain   Patient not taking: Reported on 8/25/2022   albuterol (2.5 mg/3 mL) 0.083 % nebulizer solution   No No   Sig: Take 3 mL (2.5 mg total) by nebulization every 6 (six) hours as needed for wheezing or shortness of breath   albuterol (PROVENTIL HFA,VENTOLIN HFA) 90 mcg/act inhaler   Yes No   Sig: Inhale 1 puff every 6 (six) hours as needed   albuterol (PROVENTIL HFA,VENTOLIN HFA) 90 mcg/act inhaler   No No   Sig: Inhale 2 puffs every 4 (four) hours as needed for wheezing   baclofen 10 mg tablet   No No   Sig: Take 1 tablet (10 mg total) by mouth 3 (three) times a day for 4 days   cetirizine (ZyrTEC) 10 mg tablet   No No   Sig: Take 1 tablet (10 mg total) by mouth daily   citalopram (CeleXA) 20 mg tablet   Yes No   Sig: Take 20 mg by mouth  and how have you been treating it?    Do you have any other symptoms, such as body aches, fever, or cough?    Does your sore throat recur? If so, how often? How many days of school or work have you missed because of a sore throat?    Do you have trouble eating or swallowing?    Have you been told that you snore or have other sleep problems?    Do you have bad breath?    Do you cough up bad-tasting mucus?  Physical exam  During the exam, your healthcare provider checks your ears, nose, and throat for problems. He or she also checks for swelling in the neck, and may listen to your chest.  Possible tests  Other tests your healthcare provider may perform include:    A throat swab to check for bacteria such as streptococcus (the bacteria that causes strep throat)    A blood test to check for mononucleosis (a viral infection)    A chest X-ray to rule out pneumonia, especially if you have a cough  Treating a sore throat  Treatment depends on many factors. What is the likely cause? Is the problem recent? Does it keep coming back? In many cases, the best thing to do is to treat the symptoms, rest, and let the problem heal itself. Antibiotics may help clear up some bacterial infections. For cases of severe or recurring tonsillitis, the tonsils may need to be removed.  Relieving your symptoms    Don t smoke, and avoid secondhand smoke.    For children, try throat sprays or Popsicles. Adults and older children may try lozenges.    Drink warm liquids to soothe the throat and help thin mucus. Avoid alcohol, spicy foods, and acidic drinks such as orange juice. These can irritate the throat.    Gargle with warm saltwater (1 teaspoon of salt to 8 ounces of warm water).    Use a humidifier to keep air moist and relieve throat dryness.    Try over-the-counter pain relievers such as acetaminophen or ibuprofen. Use as directed, and don t exceed the recommended dose. Don t give aspirin to children.   Are antibiotics needed?  If your sore  "throat is due to a bacterial infection, antibiotics may speed healing and prevent complications. Although group A streptococcus (\"strep throat\" or GAS) is the major treatable infection for a sore throat, GAS causes only 5% to 15% of sore throats in adults who seek medical care. Most sore throats are caused by cold or flu viruses. And antibiotics don t treat viral illness. In fact, using antibiotics when they re not needed may produce bacteria that are harder to kill. Your healthcare provider will prescribe antibiotics only if he or she thinks they are likely to help.  If antibiotics are prescribed  Take the medicine exactly as directed. Be sure to finish your prescription even if you re feeling better. And be sure to ask your healthcare provider or pharmacist what side effects are common and what to do about them.  Is surgery needed?  In some cases, tonsils need to be removed. This is often done as outpatient (same-day) surgery. Your healthcare provider may advise removing the tonsils in cases of:    Several severe bouts of tonsillitis in a year.  Severe  episodes include those that lead to missed days of school or work, or that need to be treated with antibiotics.    Tonsillitis that causes breathing problems during sleep    Tonsillitis caused by food particles collecting in pouches in the tonsils (cryptic tonsillitis)  Call your healthcare provider if any of the following occur:    Symptoms worsen, or new symptoms develop.    Swollen tonsils make breathing difficult.    The pain is severe enough to keep you from drinking liquids.    A skin rash, hives, or wheezing develops. Any of these could signal an allergic reaction to antibiotics.    Symptoms don t improve within a week.    Symptoms don t improve within 2 to 3 days of starting antibiotics.   Date Last Reviewed: 10/1/2016    9463-6135 [employer]. [address] [place] [address], [place] [address]. All rights reserved. This information is not intended as " daily   Patient not taking: Reported on 8/25/2022   diphenhydrAMINE (BENADRYL) 25 mg tablet   No No   Sig: Take 1 tablet (25 mg total) by mouth every 6 (six) hours   docusate sodium (Colace) 100 mg capsule   No No   Sig: Take 1 capsule (100 mg total) by mouth 2 (two) times a day for 10 days   fluticasone-vilanterol (BREO ELLIPTA) 200-25 MCG/INH inhaler   Yes No   Sig: Inhale 1 puff daily   hydrocortisone-pramoxine (PROCTOFOAM-HC) 1-1 % FOAM rectal foam   No No   Sig: Insert 1 applicator into the rectum 2 (two) times a day for 7 days   meclizine (ANTIVERT) 25 mg tablet   No No   Sig: Take 1 tablet (25 mg total) by mouth 3 (three) times a day as needed for dizziness   methocarbamol (ROBAXIN) 500 mg tablet   No No   Sig: Take 1 tablet (500 mg total) by mouth 2 (two) times a day as needed for muscle spasms for up to 5 days   naproxen (NAPROSYN) 500 mg tablet   No No   Sig: Take 1 tablet (500 mg total) by mouth 2 (two) times a day with meals   Patient not taking: Reported on 8/25/2022   ondansetron (Zofran ODT) 4 mg disintegrating tablet   No No   Sig: Take 1 tablet (4 mg total) by mouth every 6 (six) hours as needed for nausea or vomiting   Patient not taking: Reported on 8/25/2022   predniSONE 10 mg tablet   Yes No   Sig: Take by mouth   Patient not taking: Reported on 8/25/2022   tiotropium (SPIRIVA RESPIMAT) 1.25 MCG/ACT AERS inhaler   Yes No   Sig: Inhale 2.5 mcg daily   Patient not taking: Reported on 8/25/2022   traZODone (DESYREL) 50 mg tablet   Yes No   Sig: Take 50 mg by mouth   triamcinolone (KENALOG) 0.1 % cream   No No   Sig: Apply topically 2 (two) times a day for 7 days      Facility-Administered Medications: None     Allergies   Allergen Reactions    Aspirin Hives    Caffeine - Food Allergy Hives    Penicillins        Objective   First Vitals:   Blood Pressure: 158/70 (05/04/24 2222)  Pulse: 100 (05/04/24 2222)  Temperature: 98.2 °F (36.8 °C) (05/04/24 2222)  Respirations: 20 (05/04/24 2222)  SpO2: 98 %  (24)    Current Vitals:   Blood Pressure: 158/70 (24)  Pulse: 100 (24)  Temperature: 98.2 °F (36.8 °C) (24)  Respirations: 20 (24)  SpO2: 98 % (24)    No intake or output data in the 24 hours ending 24 2331    Invasive Devices       Peripheral Intravenous Line  Duration             Peripheral IV 24 Left Forearm <1 day                    Physical Exam  Vitals and nursing note reviewed.   Constitutional:       General: He is not in acute distress.     Appearance: Normal appearance. He is well-developed.   HENT:      Head: Normocephalic and atraumatic.      Right Ear: External ear normal.      Left Ear: External ear normal.      Nose: Nose normal.      Mouth/Throat:      Pharynx: No oropharyngeal exudate.   Eyes:      Conjunctiva/sclera: Conjunctivae normal.      Pupils: Pupils are equal, round, and reactive to light.   Cardiovascular:      Rate and Rhythm: Normal rate and regular rhythm.      Heart sounds: Murmur (x systolic murmur) heard.   Pulmonary:      Effort: Pulmonary effort is normal. No respiratory distress.   Abdominal:      General: Abdomen is flat. There is no distension.      Palpations: Abdomen is soft.   Musculoskeletal:         General: No deformity. Normal range of motion.      Cervical back: Normal range of motion and neck supple.   Skin:     General: Skin is warm and dry.      Capillary Refill: Capillary refill takes less than 2 seconds.   Neurological:      General: No focal deficit present.      Mental Status: He is alert and oriented to person, place, and time. Mental status is at baseline.      Coordination: Coordination normal.   Psychiatric:         Mood and Affect: Mood normal.         Behavior: Behavior normal.         Thought Content: Thought content normal.         Judgment: Judgment normal.           Medical Decision Makin.  Acute palpitations:Complete blood count obtained to assess for leukocytosis and  a substitute for professional medical care. Always follow your healthcare professional's instructions.         "anemia; Basic Metabolic profile obtained to assess for electrolyte disturbance, uremia, and disorders of glucose metabolism; electrocardiogram obtained to assess for arrhythmia; Troponin obtained to assess for myocardial infarction and myocarditis.  Chest x-ray rule out pneumonia pneumothorax    Recent Results (from the past 36 hour(s))   ECG 12 lead    Collection Time: 05/04/24 10:22 PM   Result Value Ref Range    Ventricular Rate 77 BPM    Atrial Rate 77 BPM    CT Interval 170 ms    QRSD Interval 112 ms    QT Interval 350 ms    QTC Interval 396 ms    P Axis 61 degrees    QRS Axis 65 degrees    T Wave Axis -30 degrees   CBC and differential    Collection Time: 05/04/24 11:14 PM   Result Value Ref Range    WBC 6.44 4.31 - 10.16 Thousand/uL    RBC 4.53 3.88 - 5.62 Million/uL    Hemoglobin 14.6 12.0 - 17.0 g/dL    Hematocrit 42.7 36.5 - 49.3 %    MCV 94 82 - 98 fL    MCH 32.2 26.8 - 34.3 pg    MCHC 34.2 31.4 - 37.4 g/dL    RDW 12.2 11.6 - 15.1 %    MPV 9.8 8.9 - 12.7 fL    Platelets 305 149 - 390 Thousands/uL    nRBC 0 /100 WBCs    Segmented % 50 43 - 75 %    Immature Grans % 0 0 - 2 %    Lymphocytes % 36 14 - 44 %    Monocytes % 10 4 - 12 %    Eosinophils Relative 3 0 - 6 %    Basophils Relative 1 0 - 1 %    Absolute Neutrophils 3.28 1.85 - 7.62 Thousands/µL    Absolute Immature Grans 0.00 0.00 - 0.20 Thousand/uL    Absolute Lymphocytes 2.33 0.60 - 4.47 Thousands/µL    Absolute Monocytes 0.61 0.17 - 1.22 Thousand/µL    Eosinophils Absolute 0.17 0.00 - 0.61 Thousand/µL    Basophils Absolute 0.05 0.00 - 0.10 Thousands/µL     X-ray chest 1 view portable    (Results Pending)         Portions of the record may have been created with voice recognition software. Occasional wrong word or \"sound a like\" substitutions may have occurred due to the inherent limitations of voice recognition software.  Read the chart carefully and recognize, using context, where substitutions have occurred.        Critical Care " Time  Procedures

## 2024-05-05 NOTE — ED PROVIDER NOTES
History  Chief Complaint   Patient presents with    Palpitations     Pt presents ambulatory with c/o palpitations and headache all day     This is a 44-year-old male who has known history of mitral valve prolapse who is presenting today with an episode of palpitations and some tightness in his anterior chest as well as headache and bodyaches.  Patient reports that over the last 24 hours he has been experiencing some muscle aches especially around his neck, upper back, and into his upper triceps/biceps.  He says that occasionally he does experience bilateral hand tingling as well which comes and goes.  His headache is reported as a 4 or 5 out of 10 in intensity and has waxed and waned through the day today.  It got significantly better after he took two 500 mg Tylenol's earlier this evening.  He denies any new neurologic symptoms such as numbness, weakness, difficulty with ambulation, difficulty with word finding, visual changes.  He is denying any new recent trauma, or history of any intracranial pathology that is known.  He does report that this evening he has been experiencing a feeling of heart racing.  He denies any drug or alcohol use, says that he does not smoke, and is not experiencing any bronwyn chest pain though he does endorse some chest tightness symptoms when has symptoms were at their worst earlier in the evening.  He is denying any shortness of breath, nausea, lightheadedness or presyncope symptoms.        Prior to Admission Medications   Prescriptions Last Dose Informant Patient Reported? Taking?   acetaminophen (TYLENOL) 500 mg tablet   No No   Sig: Take 1 tablet (500 mg total) by mouth every 6 (six) hours as needed for mild pain or moderate pain   Patient not taking: Reported on 8/25/2022   albuterol (2.5 mg/3 mL) 0.083 % nebulizer solution   No No   Sig: Take 3 mL (2.5 mg total) by nebulization every 6 (six) hours as needed for wheezing or shortness of breath   albuterol (PROVENTIL HFA,VENTOLIN HFA)  90 mcg/act inhaler   Yes No   Sig: Inhale 1 puff every 6 (six) hours as needed   albuterol (PROVENTIL HFA,VENTOLIN HFA) 90 mcg/act inhaler   No No   Sig: Inhale 2 puffs every 4 (four) hours as needed for wheezing   baclofen 10 mg tablet   No No   Sig: Take 1 tablet (10 mg total) by mouth 3 (three) times a day for 4 days   cetirizine (ZyrTEC) 10 mg tablet   No No   Sig: Take 1 tablet (10 mg total) by mouth daily   citalopram (CeleXA) 20 mg tablet   Yes No   Sig: Take 20 mg by mouth daily   Patient not taking: Reported on 8/25/2022   diphenhydrAMINE (BENADRYL) 25 mg tablet   No No   Sig: Take 1 tablet (25 mg total) by mouth every 6 (six) hours   docusate sodium (Colace) 100 mg capsule   No No   Sig: Take 1 capsule (100 mg total) by mouth 2 (two) times a day for 10 days   fluticasone-vilanterol (BREO ELLIPTA) 200-25 MCG/INH inhaler   Yes No   Sig: Inhale 1 puff daily   hydrocortisone-pramoxine (PROCTOFOAM-HC) 1-1 % FOAM rectal foam   No No   Sig: Insert 1 applicator into the rectum 2 (two) times a day for 7 days   meclizine (ANTIVERT) 25 mg tablet   No No   Sig: Take 1 tablet (25 mg total) by mouth 3 (three) times a day as needed for dizziness   methocarbamol (ROBAXIN) 500 mg tablet   No No   Sig: Take 1 tablet (500 mg total) by mouth 2 (two) times a day as needed for muscle spasms for up to 5 days   naproxen (NAPROSYN) 500 mg tablet   No No   Sig: Take 1 tablet (500 mg total) by mouth 2 (two) times a day with meals   Patient not taking: Reported on 8/25/2022   ondansetron (Zofran ODT) 4 mg disintegrating tablet   No No   Sig: Take 1 tablet (4 mg total) by mouth every 6 (six) hours as needed for nausea or vomiting   Patient not taking: Reported on 8/25/2022   predniSONE 10 mg tablet   Yes No   Sig: Take by mouth   Patient not taking: Reported on 8/25/2022   tiotropium (SPIRIVA RESPIMAT) 1.25 MCG/ACT AERS inhaler   Yes No   Sig: Inhale 2.5 mcg daily   Patient not taking: Reported on 8/25/2022   traZODone (DESYREL) 50 mg  tablet   Yes No   Sig: Take 50 mg by mouth   triamcinolone (KENALOG) 0.1 % cream   No No   Sig: Apply topically 2 (two) times a day for 7 days      Facility-Administered Medications: None       Past Medical History:   Diagnosis Date    Asthma     Mitral valve prolapse        Past Surgical History:   Procedure Laterality Date    ROTATOR CUFF REPAIR Bilateral     2020 + 2017       History reviewed. No pertinent family history.  I have reviewed and agree with the history as documented.    E-Cigarette/Vaping    E-Cigarette Use Never User      E-Cigarette/Vaping Substances    Nicotine No     THC No     CBD No     Flavoring No     Other No     Unknown No      Social History     Tobacco Use    Smoking status: Never    Smokeless tobacco: Never   Vaping Use    Vaping status: Never Used   Substance Use Topics    Alcohol use: Not Currently     Comment: socially    Drug use: No        Review of Systems   Constitutional:  Negative for chills, fatigue and fever.   HENT:  Negative for congestion and sore throat.    Eyes:  Negative for pain and visual disturbance.   Respiratory:  Positive for chest tightness. Negative for cough and shortness of breath.    Cardiovascular:  Positive for palpitations. Negative for chest pain.   Gastrointestinal:  Negative for abdominal pain, blood in stool, constipation, diarrhea, nausea and vomiting.   Genitourinary:  Negative for dysuria, flank pain and hematuria.   Musculoskeletal:  Positive for myalgias. Negative for arthralgias, back pain and neck pain.   Skin:  Negative for color change and rash.   Neurological:  Positive for headaches. Negative for dizziness, syncope and light-headedness.   Hematological:  Negative for adenopathy. Does not bruise/bleed easily.   All other systems reviewed and are negative.      Physical Exam  ED Triage Vitals   Temperature Pulse Respirations Blood Pressure SpO2   05/04/24 2222 05/04/24 2222 05/04/24 2222 05/04/24 2222 05/04/24 2222   98.2 °F (36.8 °C) 100 20  158/70 98 %      Temp src Heart Rate Source Patient Position - Orthostatic VS BP Location FiO2 (%)   -- 05/04/24 2222 -- -- --    Monitor         Pain Score       05/04/24 2300       3             Orthostatic Vital Signs  Vitals:    05/04/24 2222   BP: 158/70   Pulse: 100       Physical Exam  Vitals and nursing note reviewed.   Constitutional:       General: He is not in acute distress.     Appearance: He is well-developed. He is not toxic-appearing or diaphoretic.   HENT:      Head: Normocephalic and atraumatic.      Right Ear: External ear normal.      Left Ear: External ear normal.      Nose: Nose normal. No congestion or rhinorrhea.      Mouth/Throat:      Mouth: Mucous membranes are moist.      Pharynx: No oropharyngeal exudate or posterior oropharyngeal erythema.   Eyes:      General: No scleral icterus.     Extraocular Movements: Extraocular movements intact.      Conjunctiva/sclera: Conjunctivae normal.      Pupils: Pupils are equal, round, and reactive to light.   Neck:      Comments: No meningismus signs, no midline cervical spinal tenderness.  Cardiovascular:      Rate and Rhythm: Regular rhythm. Tachycardia present.      Pulses: Normal pulses.      Heart sounds: Murmur heard.   Pulmonary:      Effort: Pulmonary effort is normal. No respiratory distress.      Breath sounds: Normal breath sounds. No wheezing or rales.   Abdominal:      Palpations: Abdomen is soft. There is no mass.      Tenderness: There is no abdominal tenderness. There is no right CVA tenderness, left CVA tenderness or guarding.      Hernia: No hernia is present.   Musculoskeletal:         General: No swelling. Normal range of motion.      Cervical back: Normal range of motion and neck supple. Tenderness (Bilateral neck paraspinal muscle tenderness, mild) present.      Right lower leg: No edema.      Left lower leg: No edema.   Skin:     General: Skin is warm and dry.      Capillary Refill: Capillary refill takes less than 2 seconds.    Neurological:      General: No focal deficit present.      Mental Status: He is alert and oriented to person, place, and time.   Psychiatric:         Mood and Affect: Mood normal.         Behavior: Behavior normal.         ED Medications  Medications   ketorolac (TORADOL) injection 15 mg (15 mg Intravenous Given 5/4/24 2300)   sodium chloride 0.9 % bolus 1,000 mL (0 mL Intravenous Stopped 5/5/24 0030)       Diagnostic Studies  Results Reviewed       Procedure Component Value Units Date/Time    Basic metabolic panel [[id number]] Collected: 05/04/24 2314    Lab Status: Final result Specimen: Blood from Arm, Left Updated: 05/04/24 2352     Sodium 138 mmol/L      Potassium 3.5 mmol/L      Chloride 104 mmol/L      CO2 27 mmol/L      ANION GAP 7 mmol/L      BUN 10 mg/dL      Creatinine 0.76 mg/dL      Glucose 107 mg/dL      Calcium 9.5 mg/dL      eGFR 109 ml/min/1.73sq m     Narrative:      National Kidney Disease Foundation guidelines for Chronic Kidney Disease (CKD):     Stage 1 with normal or high GFR (GFR > 90 mL/min/1.73 square meters)    Stage 2 Mild CKD (GFR = 60-89 mL/min/1.73 square meters)    Stage 3A Moderate CKD (GFR = 45-59 mL/min/1.73 square meters)    Stage 3B Moderate CKD (GFR = 30-44 mL/min/1.73 square meters)    Stage 4 Severe CKD (GFR = 15-29 mL/min/1.73 square meters)    Stage 5 End Stage CKD (GFR <15 mL/min/1.73 square meters)  Note: GFR calculation is accurate only with a steady state creatinine    Magnesium [[id number]]  (Normal) Collected: 05/04/24 2314    Lab Status: Final result Specimen: Blood from Arm, Left Updated: 05/04/24 2352     Magnesium 2.1 mg/dL     HS Troponin 0hr (reflex protocol) [[id number]]  (Normal) Collected: 05/04/24 2314    Lab Status: Final result Specimen: Blood from Arm, Left Updated: 05/04/24 2346     hs TnI 0hr 4 ng/L     CBC and differential [[id number]] Collected: 05/04/24 2314    Lab Status: Final result Specimen: Blood from Arm, Left Updated: 05/04/24 2329     WBC 6.44  Thousand/uL      RBC 4.53 Million/uL      Hemoglobin 14.6 g/dL      Hematocrit 42.7 %      MCV 94 fL      MCH 32.2 pg      MCHC 34.2 g/dL      RDW 12.2 %      MPV 9.8 fL      Platelets 305 Thousands/uL      nRBC 0 /100 WBCs      Segmented % 50 %      Immature Grans % 0 %      Lymphocytes % 36 %      Monocytes % 10 %      Eosinophils Relative 3 %      Basophils Relative 1 %      Absolute Neutrophils 3.28 Thousands/µL      Absolute Immature Grans 0.00 Thousand/uL      Absolute Lymphocytes 2.33 Thousands/µL      Absolute Monocytes 0.61 Thousand/µL      Eosinophils Absolute 0.17 Thousand/µL      Basophils Absolute 0.05 Thousands/µL                    X-ray chest 1 view portable   ED Interpretation by Roger Ospina MD (05/05 0019)   No acute intrathoracic pathology evident      Final Result by Naa Hancock MD (05/05 0613)      No acute cardiopulmonary disease.            Workstation performed: [id number]               Procedures  Procedures      ED Course             HEART Risk Score      Flowsheet Row Most Recent Value   Heart Score Risk Calculator    History 0 Filed at: 05/04/2024 2349   ECG 1 Filed at: 05/04/2024 2349   Age 1 Filed at: 05/04/2024 2349   Risk Factors 0 Filed at: 05/04/2024 2349   Troponin 0 Filed at: 05/04/2024 2349   HEART Score 2 Filed at: 05/04/2024 2349                                  Medical Decision Making  Medical complexity: 44-year-old male with known mitral valve prolapse now presenting with palpitations and a feeling of chest squeezing type sensation.  Denying any nausea, vomiting, diarrhea, constipation, or other infectious type symptoms.  I do suspect that he may have a new illness that is developing throughout the day today especially with the headache and muscle aches.  However due to the history of mitral valve prolapse, and new chest squeezing-like sensation with palpitations will keep patient on telemetry monitoring, will screen EKG, and cardiac biomarkers (troponin), for  signs of ischemia, EKG changes, and will screen with x-ray for any sign of cardiomegaly, pleural effusions, or other causes of his atypical chest symptoms tonight.    Reassessment/disposition: Patient's workup was not revealing of a cause of his palpitations.  He had no recurrence of his symptoms while here in the emergency department.  Therefore he will be discharged with instructions to follow-up with his PCP and come back to the emergency department if he develops new severe symptoms.  Patient request understanding and was discharged in stable condition with stable vital signs and stable examination.    Amount and/or Complexity of Data Reviewed  Labs: ordered.  Radiology: ordered and independent interpretation performed.    Risk  Prescription drug management.          Disposition  Final diagnoses:   Palpitations     Time reflects when diagnosis was documented in both MDM as applicable and the Disposition within this note       Time User Action Codes Description Comment    5/7/2024 10:53 PM Roger Ospina Add [R00.2] Palpitations           ED Disposition       ED Disposition   Discharge    Condition   Stable    Date/Time   Sun May 5, 2024 0018    Comment   Jose CapoReyes discharge to home/self care.                   Follow-up Information       Follow up With Specialties Details Why Contact Info Additional Information    [facility] Emergency Department Emergency Medicine Go to  If symptoms worsen, As needed [address] [facility] [id number]  [phone] [facility] Emergency Department, [address] [place], [place], [address]   [phone]    [facility], Nurse Practitioner   [address] [place] [id number]  [phone]               Discharge Medication List as of 5/5/2024 12:19 AM        CONTINUE these medications which have NOT CHANGED    Details   acetaminophen (TYLENOL) 500 mg tablet Take 1 tablet (500 mg  total) by mouth every 6 (six) hours as needed for mild pain or moderate pain, Starting Tue 7/30/2019, Print      albuterol (2.5 mg/3 mL) 0.083 % nebulizer solution Take 3 mL (2.5 mg total) by nebulization every 6 (six) hours as needed for wheezing or shortness of breath, Starting Thu 3/28/2024, Normal      !! albuterol (PROVENTIL HFA,VENTOLIN HFA) 90 mcg/act inhaler Inhale 1 puff every 6 (six) hours as needed, Starting Mon 3/11/2019, Historical Med      !! albuterol (PROVENTIL HFA,VENTOLIN HFA) 90 mcg/act inhaler Inhale 2 puffs every 4 (four) hours as needed for wheezing, Starting Sat 4/17/2021, Normal      baclofen 10 mg tablet Take 1 tablet (10 mg total) by mouth 3 (three) times a day for 4 days, Starting Wed 9/25/2019, Until Sun 9/29/2019, Print      cetirizine (ZyrTEC) 10 mg tablet Take 1 tablet (10 mg total) by mouth daily, Starting Fri 12/23/2022, Normal      citalopram (CeleXA) 20 mg tablet Take 20 mg by mouth daily, Historical Med      diphenhydrAMINE (BENADRYL) 25 mg tablet Take 1 tablet (25 mg total) by mouth every 6 (six) hours, Starting Fri 12/23/2022, Normal      docusate sodium (Colace) 100 mg capsule Take 1 capsule (100 mg total) by mouth 2 (two) times a day for 10 days, Starting Wed 11/1/2023, Until Sat 11/11/2023, Normal      fluticasone-vilanterol (BREO ELLIPTA) 200-25 MCG/INH inhaler Inhale 1 puff daily, Starting Mon 8/20/2018, Historical Med      hydrocortisone-pramoxine (PROCTOFOAM-HC) 1-1 % FOAM rectal foam Insert 1 applicator into the rectum 2 (two) times a day for 7 days, Starting Wed 11/1/2023, Until Wed 11/8/2023, Normal      meclizine (ANTIVERT) 25 mg tablet Take 1 tablet (25 mg total) by mouth 3 (three) times a day as needed for dizziness, Starting Wed 6/15/2022, Normal      methocarbamol (ROBAXIN) 500 mg tablet Take 1 tablet (500 mg total) by mouth 2 (two) times a day as needed for muscle spasms for up to 5 days, Starting Sat 12/10/2022, Until Thu 12/15/2022 at 2359, Normal      naproxen  (NAPROSYN) 500 mg tablet Take 1 tablet (500 mg total) by mouth 2 (two) times a day with meals, Starting Wed 9/30/2020, Normal      ondansetron (Zofran ODT) 4 mg disintegrating tablet Take 1 tablet (4 mg total) by mouth every 6 (six) hours as needed for nausea or vomiting, Starting Wed 6/15/2022, Normal      predniSONE 10 mg tablet Take by mouth, Starting Wed 8/15/2012, Historical Med      tiotropium (SPIRIVA RESPIMAT) 1.25 MCG/ACT AERS inhaler Inhale 2.5 mcg daily, Starting Mon 8/20/2018, Historical Med      traZODone (DESYREL) 50 mg tablet Take 50 mg by mouth, Historical Med      triamcinolone (KENALOG) 0.1 % cream Apply topically 2 (two) times a day for 7 days, Starting Sun 10/10/2021, Until Sun 10/17/2021, Normal       !! - Potential duplicate medications found. Please discuss with provider.        No discharge procedures on file.    PDMP Review       None             ED Provider  Attending physically available and evaluated Jose CapoReyes. I managed the patient along with the ED Attending.    Electronically Signed by           Roger Ospina MD  05/07/24 2825

## 2024-05-05 NOTE — DISCHARGE INSTRUCTIONS
Please continue to use Tylenol and Motrin as needed for headache relief.  Your blood work is within normal limits today.  If you have new chest pain, or significant shortness of breath please come back to the emergency department.  If you feel like your heart is beating very fast and your heart rate is very elevated, especially if you are feeling lightheaded or weak please call 911 or come back to the emergency department for reevaluation depending on severity.

## 2024-06-18 ENCOUNTER — APPOINTMENT (OUTPATIENT)
Dept: LAB | Facility: CLINIC | Age: 44
End: 2024-06-18
Payer: COMMERCIAL

## 2024-06-18 ENCOUNTER — TRANSCRIBE ORDERS (OUTPATIENT)
Dept: LAB | Facility: CLINIC | Age: 44
End: 2024-06-18

## 2024-06-18 DIAGNOSIS — Z00.00 MEDICARE ANNUAL WELLNESS VISIT, SUBSEQUENT: ICD-10-CM

## 2024-06-18 DIAGNOSIS — I10 ESSENTIAL HYPERTENSION, BENIGN: ICD-10-CM

## 2024-06-18 DIAGNOSIS — Z13.6 SCREENING FOR CARDIOVASCULAR CONDITION: ICD-10-CM

## 2024-06-18 DIAGNOSIS — Z13.6 SCREENING FOR CARDIOVASCULAR CONDITION: Primary | ICD-10-CM

## 2024-06-18 LAB
ALBUMIN SERPL BCP-MCNC: 4.7 G/DL (ref 3.5–5)
ALP SERPL-CCNC: 79 U/L (ref 34–104)
ALT SERPL W P-5'-P-CCNC: 13 U/L (ref 7–52)
ANION GAP SERPL CALCULATED.3IONS-SCNC: 7 MMOL/L (ref 4–13)
AST SERPL W P-5'-P-CCNC: 21 U/L (ref 13–39)
BILIRUB SERPL-MCNC: 2.5 MG/DL (ref 0.2–1)
BUN SERPL-MCNC: 11 MG/DL (ref 5–25)
CALCIUM SERPL-MCNC: 9.5 MG/DL (ref 8.4–10.2)
CHLORIDE SERPL-SCNC: 106 MMOL/L (ref 96–108)
CHOLEST SERPL-MCNC: 176 MG/DL
CO2 SERPL-SCNC: 27 MMOL/L (ref 21–32)
CREAT SERPL-MCNC: 0.82 MG/DL (ref 0.6–1.3)
EST. AVERAGE GLUCOSE BLD GHB EST-MCNC: 117 MG/DL
GFR SERPL CREATININE-BSD FRML MDRD: 106 ML/MIN/1.73SQ M
GLUCOSE P FAST SERPL-MCNC: 94 MG/DL (ref 65–99)
HBA1C MFR BLD: 5.7 %
HDLC SERPL-MCNC: 43 MG/DL
LDLC SERPL CALC-MCNC: 120 MG/DL (ref 0–100)
NONHDLC SERPL-MCNC: 133 MG/DL
POTASSIUM SERPL-SCNC: 4 MMOL/L (ref 3.5–5.3)
PROT SERPL-MCNC: 7 G/DL (ref 6.4–8.4)
SODIUM SERPL-SCNC: 140 MMOL/L (ref 135–147)
TRIGL SERPL-MCNC: 66 MG/DL

## 2024-06-18 PROCEDURE — 80061 LIPID PANEL: CPT

## 2024-06-18 PROCEDURE — 36415 COLL VENOUS BLD VENIPUNCTURE: CPT

## 2024-06-18 PROCEDURE — 80053 COMPREHEN METABOLIC PANEL: CPT

## 2024-06-18 PROCEDURE — 83036 HEMOGLOBIN GLYCOSYLATED A1C: CPT

## 2024-08-20 ENCOUNTER — TELEPHONE (OUTPATIENT)
Age: 44
End: 2024-08-20

## 2024-08-20 NOTE — TELEPHONE ENCOUNTER
Contacted patient off of Medication Management  wait list to verify needs of services in attempts to offer appt. LVM for patient to contact intake dept  in regards to scheduling.     Attempt #1

## 2024-08-22 NOTE — TELEPHONE ENCOUNTER
Contacted patient off of Medication Management  wait list to verify needs of services in attempts to offer appt. LVM for patient to contact intake dept  in regards to scheduling    Attempt #2  Pt removed from wait list.

## 2024-11-22 ENCOUNTER — HOSPITAL ENCOUNTER (EMERGENCY)
Facility: HOSPITAL | Age: 44
Discharge: HOME | End: 2024-11-22
Attending: EMERGENCY MEDICINE
Payer: COMMERCIAL

## 2024-11-22 VITALS
RESPIRATION RATE: 20 BRPM | HEART RATE: 79 BPM | SYSTOLIC BLOOD PRESSURE: 158 MMHG | TEMPERATURE: 97.9 F | OXYGEN SATURATION: 99 % | DIASTOLIC BLOOD PRESSURE: 84 MMHG

## 2024-11-22 DIAGNOSIS — F41.9 ANXIETY: Primary | ICD-10-CM

## 2024-11-22 LAB
ATRIAL RATE: 67 BPM
P AXIS: 59 DEGREES
PR INTERVAL: 220 MS
QRS AXIS: 98 DEGREES
QRSD INTERVAL: 116 MS
QT INTERVAL: 376 MS
QTC INTERVAL: 397 MS
T WAVE AXIS: -11 DEGREES
VENTRICULAR RATE: 67 BPM

## 2024-11-22 PROCEDURE — 93010 ELECTROCARDIOGRAM REPORT: CPT | Performed by: INTERNAL MEDICINE

## 2024-11-22 PROCEDURE — 93005 ELECTROCARDIOGRAM TRACING: CPT

## 2024-11-22 PROCEDURE — 99284 EMERGENCY DEPT VISIT MOD MDM: CPT | Performed by: EMERGENCY MEDICINE

## 2024-11-22 PROCEDURE — 99283 EMERGENCY DEPT VISIT LOW MDM: CPT

## 2024-11-22 RX ORDER — LORAZEPAM 0.5 MG/1
0.5 TABLET ORAL ONCE
Status: COMPLETED | OUTPATIENT
Start: 2024-11-22 | End: 2024-11-22

## 2024-11-22 RX ADMIN — LORAZEPAM 0.5 MG: 0.5 TABLET ORAL at 02:40

## 2024-11-22 NOTE — ED ATTENDING ATTESTATION
"11/22/2024  I, Melvin Miller MD, saw and evaluated the patient. I have discussed the patient with the resident/non-physician practitioner and agree with the resident's/non-physician practitioner's findings, Plan of Care, and MDM as documented in the resident's/non-physician practitioner's note, except where noted. All available labs and Radiology studies were reviewed.  I was present for key portions of any procedure(s) performed by the resident/non-physician practitioner and I was immediately available to provide assistance.       At this point I agree with the current assessment done in the Emergency Department.  I have conducted an independent evaluation of this patient a history and physical is as follows:      Final Diagnosis:  1. Anxiety      Chief Complaint   Patient presents with    Anxiety     Pt reports sudden onset of anxiety about 30 mins ago. Pt reports unable to sleep and feels \"shaky.\" -CP, -SOB.           A:  -44-year-old male who presents with anxiety.      P:  -EKG to evaluate for arrhythmia.  -P.o. Ativan for anxiety.      H:    44-year-old male who presents with anxiety attack.  30 minutes prior to arrival, patient started to experience sudden onset palpitations and anxiety.      PMH:  Past Medical History:   Diagnosis Date    Asthma     Mitral valve prolapse        PSH:  Past Surgical History:   Procedure Laterality Date    ROTATOR CUFF REPAIR Bilateral     2020 + 2017         PE:   Vitals:    11/22/24 0218   BP: 158/84   BP Location: Left arm   Pulse: 79   Resp: 20   Temp: 97.9 °F (36.6 °C)   TempSrc: Oral   SpO2: 99%         Constitutional: Vital signs are normal. He appears well-developed. He is cooperative. No distress.   Cardiovascular: Normal rate, regular rhythm, normal heart sounds.   No murmur heard.  Pulmonary/Chest: Effort normal and breath sounds normal.   Neurological: He is alert.          - 13 point ROS was performed and all are normal unless stated in the history above.   - " Nursing note reviewed. Vitals reviewed.   - Orders placed by myself and/or advanced practitioner / resident.    - Previous chart was reviewed  - No language barrier.   - History obtained from patient.   - There are no limitations to the history obtained. Reasons ROS could not be obtained:  N/A         Medications   LORazepam (ATIVAN) tablet 0.5 mg (0.5 mg Oral Given 11/22/24 0240)     No orders to display     Orders Placed This Encounter   Procedures    ECG 12 lead    ECG 12 lead     Labs Reviewed - No data to display  Time reflects when diagnosis was documented in both MDM as applicable and the Disposition within this note       Time User Action Codes Description Comment    11/22/2024  2:59 AM Tomy Esquivel Add [F41.9] Anxiety           ED Disposition       ED Disposition   Discharge    Condition   Stable    Date/Time   Fri Nov 22, 2024  2:59 AM    Comment   Jose CapoReyes discharge to home/self care.                   Follow-up Information       Follow up With Specialties Details Why Contact Info Additional Information    [facility], Nurse Practitioner Call  As needed [address] [address]  [phone]       Critical access hospital Emergency Department Emergency Medicine Go to  If symptoms worsen or if you have any other specific concerns [address] [place] [id number] [phone] Critical access hospital Emergency Department, [address] [place] [address], [phone]   [phone]          Discharge Medication List as of 11/22/2024  2:59 AM        CONTINUE these medications which have NOT CHANGED    Details   acetaminophen (TYLENOL) 500 mg tablet Take 1 tablet (500 mg total) by mouth every 6 (six) hours as needed for mild pain or moderate pain, Starting Tue 7/30/2019, Print      albuterol (2.5 mg/3 mL) 0.083 % nebulizer solution Take 3 mL (2.5 mg total) by nebulization every 6 (six) hours as needed for wheezing or shortness  of breath, Starting Thu 3/28/2024, Normal      !! albuterol (PROVENTIL HFA,VENTOLIN HFA) 90 mcg/act inhaler Inhale 1 puff every 6 (six) hours as needed, Starting Mon 3/11/2019, Historical Med      !! albuterol (PROVENTIL HFA,VENTOLIN HFA) 90 mcg/act inhaler Inhale 2 puffs every 4 (four) hours as needed for wheezing, Starting Sat 4/17/2021, Normal      baclofen 10 mg tablet Take 1 tablet (10 mg total) by mouth 3 (three) times a day for 4 days, Starting Wed 9/25/2019, Until Sun 9/29/2019, Print      cetirizine (ZyrTEC) 10 mg tablet Take 1 tablet (10 mg total) by mouth daily, Starting Fri 12/23/2022, Normal      citalopram (CeleXA) 20 mg tablet Take 20 mg by mouth daily, Historical Med      diphenhydrAMINE (BENADRYL) 25 mg tablet Take 1 tablet (25 mg total) by mouth every 6 (six) hours, Starting Fri 12/23/2022, Normal      docusate sodium (Colace) 100 mg capsule Take 1 capsule (100 mg total) by mouth 2 (two) times a day for 10 days, Starting Wed 11/1/2023, Until Sat 11/11/2023, Normal      fluticasone-vilanterol (BREO ELLIPTA) 200-25 MCG/INH inhaler Inhale 1 puff daily, Starting Mon 8/20/2018, Historical Med      hydrocortisone-pramoxine (PROCTOFOAM-HC) 1-1 % FOAM rectal foam Insert 1 applicator into the rectum 2 (two) times a day for 7 days, Starting Wed 11/1/2023, Until Wed 11/8/2023, Normal      meclizine (ANTIVERT) 25 mg tablet Take 1 tablet (25 mg total) by mouth 3 (three) times a day as needed for dizziness, Starting Wed 6/15/2022, Normal      methocarbamol (ROBAXIN) 500 mg tablet Take 1 tablet (500 mg total) by mouth 2 (two) times a day as needed for muscle spasms for up to 5 days, Starting Sat 12/10/2022, Until u 12/15/2022 at 2359, Normal      naproxen (NAPROSYN) 500 mg tablet Take 1 tablet (500 mg total) by mouth 2 (two) times a day with meals, Starting Wed 9/30/2020, Normal      ondansetron (Zofran ODT) 4 mg disintegrating tablet Take 1 tablet (4 mg total) by mouth every 6 (six) hours as needed for nausea  or vomiting, Starting Wed 6/15/2022, Normal      predniSONE 10 mg tablet Take by mouth, Starting Wed 8/15/2012, Historical Med      tiotropium (SPIRIVA RESPIMAT) 1.25 MCG/ACT AERS inhaler Inhale 2.5 mcg daily, Starting Mon 8/20/2018, Historical Med      traZODone (DESYREL) 50 mg tablet Take 50 mg by mouth, Historical Med      triamcinolone (KENALOG) 0.1 % cream Apply topically 2 (two) times a day for 7 days, Starting Sun 10/10/2021, Until Sun 10/17/2021, Normal       !! - Potential duplicate medications found. Please discuss with provider.        No discharge procedures on file.  Prior to Admission Medications   Prescriptions Last Dose Informant Patient Reported? Taking?   acetaminophen (TYLENOL) 500 mg tablet   No No   Sig: Take 1 tablet (500 mg total) by mouth every 6 (six) hours as needed for mild pain or moderate pain   Patient not taking: Reported on 8/25/2022   albuterol (2.5 mg/3 mL) 0.083 % nebulizer solution   No No   Sig: Take 3 mL (2.5 mg total) by nebulization every 6 (six) hours as needed for wheezing or shortness of breath   albuterol (PROVENTIL HFA,VENTOLIN HFA) 90 mcg/act inhaler   Yes No   Sig: Inhale 1 puff every 6 (six) hours as needed   albuterol (PROVENTIL HFA,VENTOLIN HFA) 90 mcg/act inhaler   No No   Sig: Inhale 2 puffs every 4 (four) hours as needed for wheezing   baclofen 10 mg tablet   No No   Sig: Take 1 tablet (10 mg total) by mouth 3 (three) times a day for 4 days   cetirizine (ZyrTEC) 10 mg tablet   No No   Sig: Take 1 tablet (10 mg total) by mouth daily   citalopram (CeleXA) 20 mg tablet   Yes No   Sig: Take 20 mg by mouth daily   Patient not taking: Reported on 8/25/2022   diphenhydrAMINE (BENADRYL) 25 mg tablet   No No   Sig: Take 1 tablet (25 mg total) by mouth every 6 (six) hours   docusate sodium (Colace) 100 mg capsule   No No   Sig: Take 1 capsule (100 mg total) by mouth 2 (two) times a day for 10 days   fluticasone-vilanterol (BREO ELLIPTA) 200-25 MCG/INH inhaler   Yes No  "  Sig: Inhale 1 puff daily   hydrocortisone-pramoxine (PROCTOFOAM-HC) 1-1 % FOAM rectal foam   No No   Sig: Insert 1 applicator into the rectum 2 (two) times a day for 7 days   meclizine (ANTIVERT) 25 mg tablet   No No   Sig: Take 1 tablet (25 mg total) by mouth 3 (three) times a day as needed for dizziness   methocarbamol (ROBAXIN) 500 mg tablet   No No   Sig: Take 1 tablet (500 mg total) by mouth 2 (two) times a day as needed for muscle spasms for up to 5 days   naproxen (NAPROSYN) 500 mg tablet   No No   Sig: Take 1 tablet (500 mg total) by mouth 2 (two) times a day with meals   Patient not taking: Reported on 8/25/2022   ondansetron (Zofran ODT) 4 mg disintegrating tablet   No No   Sig: Take 1 tablet (4 mg total) by mouth every 6 (six) hours as needed for nausea or vomiting   Patient not taking: Reported on 8/25/2022   predniSONE 10 mg tablet   Yes No   Sig: Take by mouth   Patient not taking: Reported on 8/25/2022   tiotropium (SPIRIVA RESPIMAT) 1.25 MCG/ACT AERS inhaler   Yes No   Sig: Inhale 2.5 mcg daily   Patient not taking: Reported on 8/25/2022   traZODone (DESYREL) 50 mg tablet   Yes No   Sig: Take 50 mg by mouth   triamcinolone (KENALOG) 0.1 % cream   No No   Sig: Apply topically 2 (two) times a day for 7 days      Facility-Administered Medications: None       Portions of the record may have been created with voice recognition software. Occasional wrong word or \"sound a like\" substitutions may have occurred due to the inherent limitations of voice recognition software. Read the chart carefully and recognize, using context, where substitutions have occurred.       ED Course         Critical Care Time  Procedures      "

## 2024-11-22 NOTE — ED PROVIDER NOTES
Time reflects when diagnosis was documented in both MDM as applicable and the Disposition within this note       Time User Action Codes Description Comment    11/22/2024  2:59 AM Tomy Esquivel Add [F41.9] Anxiety           ED Disposition       ED Disposition   Discharge    Condition   Stable    Date/Time   Fri Nov 22, 2024  2:59 AM    Comment   Jose CapoReyes discharge to home/self care.                   Assessment & Plan       Medical Decision Making  44-year-old male presenting today for evaluation of anxiety and palpitations    Differential: Arrhythmia, anxiety attack    Plan: EKG, Ativan, reassurance    Patient is EKG on my dictation shows normal sinus rhythm with first-degree AV block.  Patient reports improvement after Ativan.  Advised follow-up with family doctor if symptoms recur.  Return precautions given, all questions answered.    Risk  Prescription drug management.        ED Course as of 11/22/24 0533   Fri Nov 22, 2024   0258 This EKG interpreted by me.  Rate 67, normal sinus rhythm with first-degree AV block, intervals otherwise normal, no acute ST or T wave changes from previous EKG from May 4, 2024       Medications   LORazepam (ATIVAN) tablet 0.5 mg (0.5 mg Oral Given 11/22/24 0240)       ED Risk Strat Scores                           SBIRT 22yo+      Flowsheet Row Most Recent Value   Initial Alcohol Screen: US AUDIT-C     1. How often do you have a drink containing alcohol? 0 Filed at: 11/22/2024 0219   2. How many drinks containing alcohol do you have on a typical day you are drinking?  0 Filed at: 11/22/2024 0219   3a. Male UNDER 65: How often do you have five or more drinks on one occasion? 0 Filed at: 11/22/2024 0219   3b. FEMALE Any Age, or MALE 65+: How often do you have 4 or more drinks on one occassion? 0 Filed at: 11/22/2024 0219   Audit-C Score 0 Filed at: 11/22/2024 0219   NELSON: How many times in the past year have you...    Used an illegal drug or used a prescription medication for  "non-medical reasons? Never Filed at: 11/22/2024 0219                            History of Present Illness       Chief Complaint   Patient presents with    Anxiety     Pt reports sudden onset of anxiety about 30 mins ago. Pt reports unable to sleep and feels \"shaky.\" -CP, -SOB.       Past Medical History:   Diagnosis Date    Asthma     Mitral valve prolapse       Past Surgical History:   Procedure Laterality Date    ROTATOR CUFF REPAIR Bilateral     2020 + 2017      History reviewed. No pertinent family history.   Social History     Tobacco Use    Smoking status: Never    Smokeless tobacco: Never   Vaping Use    Vaping status: Never Used   Substance Use Topics    Alcohol use: Not Currently     Comment: socially    Drug use: No      E-Cigarette/Vaping    E-Cigarette Use Never User       E-Cigarette/Vaping Substances    Nicotine No     THC No     CBD No     Flavoring No     Other No     Unknown No       I have reviewed and agree with the history as documented.     Patient is a 44-year-old male presenting today for evaluation of anxiety.  Patient states that he had sudden onset anxiety bout 30 minutes prior to arrival.  He states that he feels \"shaky\" and that his symptoms are associated with palpitations.  He also says he is having some heartburn.  Denies any fevers, chills, chest pain, shortness of breath, abdominal pain, nausea, vomiting or diaphoresis.  States that he has had anxiety attacks in the past and endorses some recent stressors.  Denies any DVT or PE risk factors.  Denies any smoking, drinking, or drug use.        Review of Systems   Constitutional:  Negative for chills and fever.   HENT:  Negative for congestion, rhinorrhea and sore throat.    Eyes:  Negative for pain and redness.   Respiratory:  Negative for cough and shortness of breath.    Cardiovascular:  Positive for palpitations. Negative for chest pain.   Gastrointestinal:  Negative for abdominal pain, constipation, diarrhea, nausea and vomiting. "   Genitourinary:  Negative for dysuria and hematuria.   Musculoskeletal:  Negative for back pain and neck pain.   Skin:  Negative for pallor and rash.   Neurological:  Negative for weakness and numbness.   Psychiatric/Behavioral:  The patient is nervous/anxious.    All other systems reviewed and are negative.          Objective       ED Triage Vitals [11/22/24 0218]   Temperature Pulse Blood Pressure Respirations SpO2 Patient Position - Orthostatic VS   97.9 °F (36.6 °C) 79 158/84 20 99 % Sitting      Temp Source Heart Rate Source BP Location FiO2 (%) Pain Score    Oral Monitor Left arm -- No Pain      Vitals      Date and Time Temp Pulse SpO2 Resp BP Pain Score FACES Pain Rating User   11/22/24 0218 97.9 °F (36.6 °C) 79 99 % 20 158/84 No Pain -- OO            Physical Exam  Vitals and nursing note reviewed.   Constitutional:       General: He is not in acute distress.     Appearance: Normal appearance. He is well-developed and normal weight. He is not ill-appearing, toxic-appearing or diaphoretic.   HENT:      Head: Normocephalic and atraumatic.      Right Ear: External ear normal.      Left Ear: External ear normal.      Nose: Nose normal. No congestion or rhinorrhea.      Mouth/Throat:      Mouth: Mucous membranes are moist.      Pharynx: Oropharynx is clear.   Eyes:      General: No scleral icterus.        Right eye: No discharge.         Left eye: No discharge.      Conjunctiva/sclera: Conjunctivae normal.   Cardiovascular:      Rate and Rhythm: Normal rate and regular rhythm.      Pulses: Normal pulses.      Heart sounds: Normal heart sounds. No murmur heard.     No friction rub. No gallop.   Pulmonary:      Effort: Pulmonary effort is normal. No respiratory distress.      Breath sounds: Normal breath sounds. No stridor. No wheezing, rhonchi or rales.   Abdominal:      General: Abdomen is flat. Bowel sounds are normal. There is no distension.      Palpations: Abdomen is soft.      Tenderness: There is no  abdominal tenderness. There is no guarding.   Musculoskeletal:         General: Normal range of motion.      Cervical back: Normal range of motion and neck supple.      Right lower leg: No edema.      Left lower leg: No edema.   Skin:     General: Skin is warm and dry.      Capillary Refill: Capillary refill takes less than 2 seconds.      Coloration: Skin is not jaundiced or pale.   Neurological:      General: No focal deficit present.      Mental Status: He is alert and oriented to person, place, and time.   Psychiatric:         Mood and Affect: Mood normal.         Behavior: Behavior normal.         Results Reviewed       None            No orders to display       Procedures    ED Medication and Procedure Management   Prior to Admission Medications   Prescriptions Last Dose Informant Patient Reported? Taking?   acetaminophen (TYLENOL) 500 mg tablet   No No   Sig: Take 1 tablet (500 mg total) by mouth every 6 (six) hours as needed for mild pain or moderate pain   Patient not taking: Reported on 8/25/2022   albuterol (2.5 mg/3 mL) 0.083 % nebulizer solution   No No   Sig: Take 3 mL (2.5 mg total) by nebulization every 6 (six) hours as needed for wheezing or shortness of breath   albuterol (PROVENTIL HFA,VENTOLIN HFA) 90 mcg/act inhaler   Yes No   Sig: Inhale 1 puff every 6 (six) hours as needed   albuterol (PROVENTIL HFA,VENTOLIN HFA) 90 mcg/act inhaler   No No   Sig: Inhale 2 puffs every 4 (four) hours as needed for wheezing   baclofen 10 mg tablet   No No   Sig: Take 1 tablet (10 mg total) by mouth 3 (three) times a day for 4 days   cetirizine (ZyrTEC) 10 mg tablet   No No   Sig: Take 1 tablet (10 mg total) by mouth daily   citalopram (CeleXA) 20 mg tablet   Yes No   Sig: Take 20 mg by mouth daily   Patient not taking: Reported on 8/25/2022   diphenhydrAMINE (BENADRYL) 25 mg tablet   No No   Sig: Take 1 tablet (25 mg total) by mouth every 6 (six) hours   docusate sodium (Colace) 100 mg capsule   No No   Sig: Take  1 capsule (100 mg total) by mouth 2 (two) times a day for 10 days   fluticasone-vilanterol (BREO ELLIPTA) 200-25 MCG/INH inhaler   Yes No   Sig: Inhale 1 puff daily   hydrocortisone-pramoxine (PROCTOFOAM-HC) 1-1 % FOAM rectal foam   No No   Sig: Insert 1 applicator into the rectum 2 (two) times a day for 7 days   meclizine (ANTIVERT) 25 mg tablet   No No   Sig: Take 1 tablet (25 mg total) by mouth 3 (three) times a day as needed for dizziness   methocarbamol (ROBAXIN) 500 mg tablet   No No   Sig: Take 1 tablet (500 mg total) by mouth 2 (two) times a day as needed for muscle spasms for up to 5 days   naproxen (NAPROSYN) 500 mg tablet   No No   Sig: Take 1 tablet (500 mg total) by mouth 2 (two) times a day with meals   Patient not taking: Reported on 8/25/2022   ondansetron (Zofran ODT) 4 mg disintegrating tablet   No No   Sig: Take 1 tablet (4 mg total) by mouth every 6 (six) hours as needed for nausea or vomiting   Patient not taking: Reported on 8/25/2022   predniSONE 10 mg tablet   Yes No   Sig: Take by mouth   Patient not taking: Reported on 8/25/2022   tiotropium (SPIRIVA RESPIMAT) 1.25 MCG/ACT AERS inhaler   Yes No   Sig: Inhale 2.5 mcg daily   Patient not taking: Reported on 8/25/2022   traZODone (DESYREL) 50 mg tablet   Yes No   Sig: Take 50 mg by mouth   triamcinolone (KENALOG) 0.1 % cream   No No   Sig: Apply topically 2 (two) times a day for 7 days      Facility-Administered Medications: None     Discharge Medication List as of 11/22/2024  2:59 AM        CONTINUE these medications which have NOT CHANGED    Details   acetaminophen (TYLENOL) 500 mg tablet Take 1 tablet (500 mg total) by mouth every 6 (six) hours as needed for mild pain or moderate pain, Starting Tue 7/30/2019, Print      albuterol (2.5 mg/3 mL) 0.083 % nebulizer solution Take 3 mL (2.5 mg total) by nebulization every 6 (six) hours as needed for wheezing or shortness of breath, Starting Thu 3/28/2024, Normal      !! albuterol (PROVENTIL  HFA,VENTOLIN HFA) 90 mcg/act inhaler Inhale 1 puff every 6 (six) hours as needed, Starting Mon 3/11/2019, Historical Med      !! albuterol (PROVENTIL HFA,VENTOLIN HFA) 90 mcg/act inhaler Inhale 2 puffs every 4 (four) hours as needed for wheezing, Starting Sat 4/17/2021, Normal      baclofen 10 mg tablet Take 1 tablet (10 mg total) by mouth 3 (three) times a day for 4 days, Starting Wed 9/25/2019, Until Sun 9/29/2019, Print      cetirizine (ZyrTEC) 10 mg tablet Take 1 tablet (10 mg total) by mouth daily, Starting Fri 12/23/2022, Normal      citalopram (CeleXA) 20 mg tablet Take 20 mg by mouth daily, Historical Med      diphenhydrAMINE (BENADRYL) 25 mg tablet Take 1 tablet (25 mg total) by mouth every 6 (six) hours, Starting Fri 12/23/2022, Normal      docusate sodium (Colace) 100 mg capsule Take 1 capsule (100 mg total) by mouth 2 (two) times a day for 10 days, Starting Wed 11/1/2023, Until Sat 11/11/2023, Normal      fluticasone-vilanterol (BREO ELLIPTA) 200-25 MCG/INH inhaler Inhale 1 puff daily, Starting Mon 8/20/2018, Historical Med      hydrocortisone-pramoxine (PROCTOFOAM-HC) 1-1 % FOAM rectal foam Insert 1 applicator into the rectum 2 (two) times a day for 7 days, Starting Wed 11/1/2023, Until Wed 11/8/2023, Normal      meclizine (ANTIVERT) 25 mg tablet Take 1 tablet (25 mg total) by mouth 3 (three) times a day as needed for dizziness, Starting Wed 6/15/2022, Normal      methocarbamol (ROBAXIN) 500 mg tablet Take 1 tablet (500 mg total) by mouth 2 (two) times a day as needed for muscle spasms for up to 5 days, Starting Sat 12/10/2022, Until u 12/15/2022 at 2359, Normal      naproxen (NAPROSYN) 500 mg tablet Take 1 tablet (500 mg total) by mouth 2 (two) times a day with meals, Starting Wed 9/30/2020, Normal      ondansetron (Zofran ODT) 4 mg disintegrating tablet Take 1 tablet (4 mg total) by mouth every 6 (six) hours as needed for nausea or vomiting, Starting Wed 6/15/2022, Normal      predniSONE 10 mg  tablet Take by mouth, Starting Wed 8/15/2012, Historical Med      tiotropium (SPIRIVA RESPIMAT) 1.25 MCG/ACT AERS inhaler Inhale 2.5 mcg daily, Starting Mon 8/20/2018, Historical Med      traZODone (DESYREL) 50 mg tablet Take 50 mg by mouth, Historical Med      triamcinolone (KENALOG) 0.1 % cream Apply topically 2 (two) times a day for 7 days, Starting Sun 10/10/2021, Until Sun 10/17/2021, Normal       !! - Potential duplicate medications found. Please discuss with provider.        No discharge procedures on file.  ED SEPSIS DOCUMENTATION   Time reflects when diagnosis was documented in both MDM as applicable and the Disposition within this note       Time User Action Codes Description Comment    11/22/2024  2:59 AM Tomy Esquivel Add [F41.9] Anxiety                  Tomy Esquivel MD  11/22/24 0596

## 2024-11-22 NOTE — DISCHARGE INSTRUCTIONS
Please return if you develop any new or concerning symptoms including severe chest pain, difficulty breathing, or recurrent heart palpitations.

## 2024-12-17 ENCOUNTER — APPOINTMENT (OUTPATIENT)
Dept: LAB | Facility: CLINIC | Age: 44
End: 2024-12-17
Payer: COMMERCIAL

## 2024-12-17 DIAGNOSIS — I25.9 CHEST PAIN DUE TO MYOCARDIAL ISCHEMIA, UNSPECIFIED ISCHEMIC CHEST PAIN TYPE: ICD-10-CM

## 2024-12-17 DIAGNOSIS — I10 ESSENTIAL HYPERTENSION, MALIGNANT: ICD-10-CM

## 2024-12-17 DIAGNOSIS — R55 SYNCOPE AND COLLAPSE: ICD-10-CM

## 2024-12-17 DIAGNOSIS — I34.0 NONRHEUMATIC MITRAL VALVE REGURGITATION: ICD-10-CM

## 2024-12-17 DIAGNOSIS — R00.2 PALPITATIONS: ICD-10-CM

## 2024-12-17 DIAGNOSIS — I49.3 VENTRICULAR PREMATURE BEATS: ICD-10-CM

## 2024-12-17 DIAGNOSIS — I34.1 MITRAL VALVE PROLAPSE: ICD-10-CM

## 2024-12-17 LAB
ANION GAP SERPL CALCULATED.3IONS-SCNC: 7 MMOL/L (ref 4–13)
BUN SERPL-MCNC: 10 MG/DL (ref 5–25)
CALCIUM SERPL-MCNC: 9.4 MG/DL (ref 8.4–10.2)
CHLORIDE SERPL-SCNC: 104 MMOL/L (ref 96–108)
CO2 SERPL-SCNC: 29 MMOL/L (ref 21–32)
CREAT SERPL-MCNC: 0.78 MG/DL (ref 0.6–1.3)
GFR SERPL CREATININE-BSD FRML MDRD: 108 ML/MIN/1.73SQ M
GLUCOSE SERPL-MCNC: 89 MG/DL (ref 65–140)
POTASSIUM SERPL-SCNC: 3.9 MMOL/L (ref 3.5–5.3)
SODIUM SERPL-SCNC: 140 MMOL/L (ref 135–147)

## 2024-12-17 PROCEDURE — 36415 COLL VENOUS BLD VENIPUNCTURE: CPT

## 2024-12-17 PROCEDURE — 80048 BASIC METABOLIC PNL TOTAL CA: CPT

## 2025-01-14 ENCOUNTER — HOSPITAL ENCOUNTER (EMERGENCY)
Facility: HOSPITAL | Age: 45
Discharge: HOME | End: 2025-01-14
Attending: EMERGENCY MEDICINE | Admitting: EMERGENCY MEDICINE
Payer: COMMERCIAL

## 2025-01-14 ENCOUNTER — APPOINTMENT (EMERGENCY)
Dept: RADIOLOGY | Facility: HOSPITAL | Age: 45
End: 2025-01-14
Payer: COMMERCIAL

## 2025-01-14 VITALS
DIASTOLIC BLOOD PRESSURE: 80 MMHG | OXYGEN SATURATION: 100 % | SYSTOLIC BLOOD PRESSURE: 150 MMHG | HEART RATE: 64 BPM | RESPIRATION RATE: 18 BRPM | TEMPERATURE: 98.1 F

## 2025-01-14 DIAGNOSIS — R07.9 CHEST PAIN: Primary | ICD-10-CM

## 2025-01-14 LAB
2HR DELTA HS TROPONIN: 0 NG/L
ALBUMIN SERPL BCG-MCNC: 4.6 G/DL (ref 3.5–5)
ALP SERPL-CCNC: 64 U/L (ref 34–104)
ALT SERPL W P-5'-P-CCNC: 9 U/L (ref 7–52)
ANION GAP SERPL CALCULATED.3IONS-SCNC: 9 MMOL/L (ref 4–13)
AST SERPL W P-5'-P-CCNC: 14 U/L (ref 13–39)
ATRIAL RATE: 69 BPM
BASOPHILS # BLD AUTO: 0.07 THOUSANDS/ΜL (ref 0–0.1)
BASOPHILS NFR BLD AUTO: 1 % (ref 0–1)
BILIRUB SERPL-MCNC: 1.28 MG/DL (ref 0.2–1)
BUN SERPL-MCNC: 12 MG/DL (ref 5–25)
CALCIUM SERPL-MCNC: 9.3 MG/DL (ref 8.4–10.2)
CARDIAC TROPONIN I PNL SERPL HS: 4 NG/L (ref ?–50)
CARDIAC TROPONIN I PNL SERPL HS: 4 NG/L (ref ?–50)
CHLORIDE SERPL-SCNC: 105 MMOL/L (ref 96–108)
CO2 SERPL-SCNC: 26 MMOL/L (ref 21–32)
CREAT SERPL-MCNC: 0.92 MG/DL (ref 0.6–1.3)
EOSINOPHIL # BLD AUTO: 0.2 THOUSAND/ΜL (ref 0–0.61)
EOSINOPHIL NFR BLD AUTO: 3 % (ref 0–6)
ERYTHROCYTE [DISTWIDTH] IN BLOOD BY AUTOMATED COUNT: 11.8 % (ref 11.6–15.1)
GFR SERPL CREATININE-BSD FRML MDRD: 99 ML/MIN/1.73SQ M
GLUCOSE SERPL-MCNC: 96 MG/DL (ref 65–140)
HCT VFR BLD AUTO: 41.6 % (ref 36.5–49.3)
HGB BLD-MCNC: 14.5 G/DL (ref 12–17)
IMM GRANULOCYTES # BLD AUTO: 0.01 THOUSAND/UL (ref 0–0.2)
IMM GRANULOCYTES NFR BLD AUTO: 0 % (ref 0–2)
LYMPHOCYTES # BLD AUTO: 2.6 THOUSANDS/ΜL (ref 0.6–4.47)
LYMPHOCYTES NFR BLD AUTO: 39 % (ref 14–44)
MCH RBC QN AUTO: 33 PG (ref 26.8–34.3)
MCHC RBC AUTO-ENTMCNC: 34.9 G/DL (ref 31.4–37.4)
MCV RBC AUTO: 95 FL (ref 82–98)
MONOCYTES # BLD AUTO: 0.53 THOUSAND/ΜL (ref 0.17–1.22)
MONOCYTES NFR BLD AUTO: 8 % (ref 4–12)
NEUTROPHILS # BLD AUTO: 3.31 THOUSANDS/ΜL (ref 1.85–7.62)
NEUTS SEG NFR BLD AUTO: 49 % (ref 43–75)
NRBC BLD AUTO-RTO: 0 /100 WBCS
P AXIS: 65 DEGREES
PLATELET # BLD AUTO: 269 THOUSANDS/UL (ref 149–390)
PMV BLD AUTO: 9.9 FL (ref 8.9–12.7)
POTASSIUM SERPL-SCNC: 4.4 MMOL/L (ref 3.5–5.3)
PR INTERVAL: 194 MS
PROT SERPL-MCNC: 6.8 G/DL (ref 6.4–8.4)
QRS AXIS: 95 DEGREES
QRSD INTERVAL: 112 MS
QT INTERVAL: 364 MS
QTC INTERVAL: 390 MS
RBC # BLD AUTO: 4.39 MILLION/UL (ref 3.88–5.62)
SODIUM SERPL-SCNC: 140 MMOL/L (ref 135–147)
T WAVE AXIS: -22 DEGREES
VENTRICULAR RATE: 69 BPM
WBC # BLD AUTO: 6.72 THOUSAND/UL (ref 4.31–10.16)

## 2025-01-14 PROCEDURE — 93010 ELECTROCARDIOGRAM REPORT: CPT | Performed by: INTERNAL MEDICINE

## 2025-01-14 PROCEDURE — 99285 EMERGENCY DEPT VISIT HI MDM: CPT

## 2025-01-14 PROCEDURE — 36415 COLL VENOUS BLD VENIPUNCTURE: CPT | Performed by: STUDENT IN AN ORGANIZED HEALTH CARE EDUCATION/TRAINING PROGRAM

## 2025-01-14 PROCEDURE — 93005 ELECTROCARDIOGRAM TRACING: CPT

## 2025-01-14 PROCEDURE — 99285 EMERGENCY DEPT VISIT HI MDM: CPT | Performed by: EMERGENCY MEDICINE

## 2025-01-14 PROCEDURE — 84484 ASSAY OF TROPONIN QUANT: CPT | Performed by: STUDENT IN AN ORGANIZED HEALTH CARE EDUCATION/TRAINING PROGRAM

## 2025-01-14 PROCEDURE — 85025 COMPLETE CBC W/AUTO DIFF WBC: CPT | Performed by: STUDENT IN AN ORGANIZED HEALTH CARE EDUCATION/TRAINING PROGRAM

## 2025-01-14 PROCEDURE — 80053 COMPREHEN METABOLIC PANEL: CPT | Performed by: STUDENT IN AN ORGANIZED HEALTH CARE EDUCATION/TRAINING PROGRAM

## 2025-01-14 PROCEDURE — 71046 X-RAY EXAM CHEST 2 VIEWS: CPT

## 2025-01-14 NOTE — ED PROVIDER NOTES
Time reflects when diagnosis was documented in both MDM as applicable and the Disposition within this note       Time User Action Codes Description Comment    1/14/2025  6:33 AM Jeffery Cameron Add [R07.9] Chest pain           ED Disposition       ED Disposition   Discharge    Condition   Stable    Date/Time   Tue Jan 14, 2025  6:33 AM    Comment   Jose CapoReyes discharge to home/self care.                   Assessment & Plan       Medical Decision Making  44-year-old male with history of mitral valve prolapse presents to the emergency department today for evaluation of right-sided chest pain.  On examination the pain is reproducible with palpation and approximate the fourth rib in the midclavicular line.  No overlying skin changes.  Murmur appreciated on examination consistent with mitral valve prolapse.  Breath sounds clear bilaterally.  Differential includes but not limited to, costochondritis/chest wall pain, ACS, pneumothorax, pneumonia.  Will obtain EKG, CBC, CMP, troponin and chest x-ray to exclude other etiologies.  EKG interpreted by myself and attending physician reveals a heart rate of 69 with no ST elevations consistent with ACS or other findings indicative of concerning arrhythmias.  Initial troponin 4, however given acute onset chest pain just prior to arrival, patient will require delta.  Patient remained hemodynamically stable during his visit.  Labs reassuring with a delta of 0.  Chest x-ray interpreted by myself revealed no acute cardiopulmonary processes.  Discussed likely musculoskeletal etiology of the chest pain and all questions were answered.  Patient discharged home in stable condition with strict emergency department return precautions and outpatient follow-up instructions.    Amount and/or Complexity of Data Reviewed  Labs: ordered.  Radiology: ordered.             Medications - No data to display    ED Risk Strat Scores   HEART Risk Score      Flowsheet Row Most Recent Value   Heart Score  Risk Calculator    History 0 Filed at: 01/14/2025 0633   ECG 0 Filed at: 01/14/2025 0633   Age 1 Filed at: 01/14/2025 0633   Risk Factors 1 Filed at: 01/14/2025 0633   Troponin 0 Filed at: 01/14/2025 0633   HEART Score 2 Filed at: 01/14/2025 0633          HEART Risk Score      Flowsheet Row Most Recent Value   Heart Score Risk Calculator    History 0 Filed at: 01/14/2025 0633   ECG 0 Filed at: 01/14/2025 0633   Age 1 Filed at: 01/14/2025 0633   Risk Factors 1 Filed at: 01/14/2025 0633   Troponin 0 Filed at: 01/14/2025 0633   HEART Score 2 Filed at: 01/14/2025 0633                        PERC Rule for PE      Flowsheet Row Most Recent Value   PERC Rule for PE    Age >=50 0 Filed at: 01/14/2025 0340   HR >=100 0 Filed at: 01/14/2025 0340   O2 Sat on room air < 95% 0 Filed at: 01/14/2025 0340   History of PE or DVT 0 Filed at: 01/14/2025 0340   Recent trauma or surgery 0 Filed at: 01/14/2025 0340   Hemoptysis 0 Filed at: 01/14/2025 0340   Exogenous estrogen 0 Filed at: 01/14/2025 0340   Unilateral leg swelling 0 Filed at: 01/14/2025 0340   PERC Rule for PE Results 0 Filed at: 01/14/2025 0340                                History of Present Illness       Chief Complaint   Patient presents with    Chest Pain     Pt developed non radiating mid chest pain approx 1 hour ago, denies SOB       Past Medical History:   Diagnosis Date    Asthma     Mitral valve prolapse       Past Surgical History:   Procedure Laterality Date    ROTATOR CUFF REPAIR Bilateral     2020 + 2017      History reviewed. No pertinent family history.   Social History     Tobacco Use    Smoking status: Never    Smokeless tobacco: Never   Vaping Use    Vaping status: Never Used   Substance Use Topics    Alcohol use: Not Currently     Comment: socially    Drug use: No      E-Cigarette/Vaping    E-Cigarette Use Never User       E-Cigarette/Vaping Substances    Nicotine No     THC No     CBD No     Flavoring No     Other No     Unknown No       I have  reviewed and agree with the history as documented.     44-year-old male with known history of mitral valve prolapse presents the emergency department today for evaluation of right side chest pain.  Patient tells me the right side chest pain began this evening and woke him from sleep.  He states it does not radiate that is reproducible when pressure is applied to the area.  No history of acid reflux and no palpitations mouth.  No lightheadedness or dizziness.  Denies shortness of breath.  No radiation of pain to jaw arm or back.  Denies recent injury/trauma.        Review of Systems   Constitutional:  Negative for activity change, appetite change, chills, fatigue and fever.   Eyes:  Negative for visual disturbance.   Respiratory:  Negative for chest tightness and shortness of breath.    Cardiovascular:  Positive for chest pain. Negative for palpitations.   Gastrointestinal:  Negative for abdominal pain, nausea and vomiting.   Musculoskeletal:  Negative for back pain and neck pain.   Neurological:  Negative for dizziness, light-headedness and headaches.           Objective       ED Triage Vitals [01/14/25 0243]   Temperature Pulse Blood Pressure Respirations SpO2 Patient Position - Orthostatic VS   98.1 °F (36.7 °C) 64 150/80 18 100 % Lying      Temp Source Heart Rate Source BP Location FiO2 (%) Pain Score    Temporal Monitor Right arm -- --      Vitals      Date and Time Temp Pulse SpO2 Resp BP Pain Score FACES Pain Rating User   01/14/25 0243 98.1 °F (36.7 °C) 64 100 % 18 150/80 -- -- PT            Physical Exam  Constitutional:       General: He is not in acute distress.     Appearance: He is well-developed. He is not ill-appearing.   HENT:      Head: Normocephalic and atraumatic.   Cardiovascular:      Heart sounds: Heart sounds not distant. Murmur heard.      No friction rub.   Pulmonary:      Effort: Pulmonary effort is normal. No tachypnea.      Breath sounds: Normal breath sounds.   Chest:      Chest wall:  Tenderness present. No mass, deformity or crepitus.      Comments: Tenderness palpation of the right chest in the midclavicular line at approximately fifth or sixth rib  Abdominal:      Palpations: Abdomen is soft. There is no mass.      Tenderness: There is no abdominal tenderness.   Skin:     General: Skin is warm and dry.      Capillary Refill: Capillary refill takes less than 2 seconds.      Coloration: Skin is not cyanotic.      Findings: No erythema or rash.   Neurological:      Mental Status: He is alert and oriented to person, place, and time.         Results Reviewed       Procedure Component Value Units Date/Time    HS Troponin I 2hr [[id number]]  (Normal) Collected: 01/14/25 0547    Lab Status: Final result Specimen: Blood from Arm, Left Updated: 01/14/25 0628     hs TnI 2hr 4 ng/L      Delta 2hr hsTnI 0 ng/L     Comprehensive metabolic panel [[id number]]  (Abnormal) Collected: 01/14/25 0354    Lab Status: Final result Specimen: Blood from Arm, Left Updated: 01/14/25 0450     Sodium 140 mmol/L      Potassium 4.4 mmol/L      Chloride 105 mmol/L      CO2 26 mmol/L      ANION GAP 9 mmol/L      BUN 12 mg/dL      Creatinine 0.92 mg/dL      Glucose 96 mg/dL      Calcium 9.3 mg/dL      AST 14 U/L      ALT 9 U/L      Alkaline Phosphatase 64 U/L      Total Protein 6.8 g/dL      Albumin 4.6 g/dL      Total Bilirubin 1.28 mg/dL      eGFR 99 ml/min/1.73sq m     Narrative:      National Kid[employer] Foundation guidelines for Chronic Kidney Disease (CKD):     Stage 1 with normal or high GFR (GFR > 90 mL/min/1.73 square meters)    Stage 2 Mild CKD (GFR = 60-89 mL/min/1.73 square meters)    Stage 3A Moderate CKD (GFR = 45-59 mL/min/1.73 square meters)    Stage 3B Moderate CKD (GFR = 30-44 mL/min/1.73 square meters)    Stage 4 Severe CKD (GFR = 15-29 mL/min/1.73 square meters)    Stage 5 End Stage CKD (GFR <15 mL/min/1.73 square meters)  Note: GFR calculation is accurate only with a steady state creatinine    HS Troponin  I 4hr [[id number]]     Lab Status: No result Specimen: Blood     HS Troponin 0hr (reflex protocol) [[id number]]  (Normal) Collected: 01/14/25 0354    Lab Status: Final result Specimen: Blood from Arm, Left Updated: 01/14/25 0430     hs TnI 0hr 4 ng/L     CBC and differential [[id number]] Collected: 01/14/25 0354    Lab Status: Final result Specimen: Blood from Arm, Left Updated: 01/14/25 0401     WBC 6.72 Thousand/uL      RBC 4.39 Million/uL      Hemoglobin 14.5 g/dL      Hematocrit 41.6 %      MCV 95 fL      MCH 33.0 pg      MCHC 34.9 g/dL      RDW 11.8 %      MPV 9.9 fL      Platelets 269 Thousands/uL      nRBC 0 /100 WBCs      Segmented % 49 %      Immature Grans % 0 %      Lymphocytes % 39 %      Monocytes % 8 %      Eosinophils Relative 3 %      Basophils Relative 1 %      Absolute Neutrophils 3.31 Thousands/µL      Absolute Immature Grans 0.01 Thousand/uL      Absolute Lymphocytes 2.60 Thousands/µL      Absolute Monocytes 0.53 Thousand/µL      Eosinophils Absolute 0.20 Thousand/µL      Basophils Absolute 0.07 Thousands/µL             XR chest 2 views    (Results Pending)       Procedures    ED Medication and Procedure Management   Prior to Admission Medications   Prescriptions Last Dose Informant Patient Reported? Taking?   acetaminophen (TYLENOL) 500 mg tablet   No No   Sig: Take 1 tablet (500 mg total) by mouth every 6 (six) hours as needed for mild pain or moderate pain   Patient not taking: Reported on 8/25/2022   albuterol (2.5 mg/3 mL) 0.083 % nebulizer solution   No No   Sig: Take 3 mL (2.5 mg total) by nebulization every 6 (six) hours as needed for wheezing or shortness of breath   albuterol (PROVENTIL HFA,VENTOLIN HFA) 90 mcg/act inhaler   Yes No   Sig: Inhale 1 puff every 6 (six) hours as needed   albuterol (PROVENTIL HFA,VENTOLIN HFA) 90 mcg/act inhaler   No No   Sig: Inhale 2 puffs every 4 (four) hours as needed for wheezing   baclofen 10 mg tablet   No No   Sig: Take 1 tablet (10 mg total) by mouth 3  (three) times a day for 4 days   cetirizine (ZyrTEC) 10 mg tablet   No No   Sig: Take 1 tablet (10 mg total) by mouth daily   citalopram (CeleXA) 20 mg tablet   Yes No   Sig: Take 20 mg by mouth daily   Patient not taking: Reported on 8/25/2022   diphenhydrAMINE (BENADRYL) 25 mg tablet   No No   Sig: Take 1 tablet (25 mg total) by mouth every 6 (six) hours   docusate sodium (Colace) 100 mg capsule   No No   Sig: Take 1 capsule (100 mg total) by mouth 2 (two) times a day for 10 days   fluticasone-vilanterol (BREO ELLIPTA) 200-25 MCG/INH inhaler   Yes No   Sig: Inhale 1 puff daily   hydrocortisone-pramoxine (PROCTOFOAM-HC) 1-1 % FOAM rectal foam   No No   Sig: Insert 1 applicator into the rectum 2 (two) times a day for 7 days   meclizine (ANTIVERT) 25 mg tablet   No No   Sig: Take 1 tablet (25 mg total) by mouth 3 (three) times a day as needed for dizziness   methocarbamol (ROBAXIN) 500 mg tablet   No No   Sig: Take 1 tablet (500 mg total) by mouth 2 (two) times a day as needed for muscle spasms for up to 5 days   naproxen (NAPROSYN) 500 mg tablet   No No   Sig: Take 1 tablet (500 mg total) by mouth 2 (two) times a day with meals   Patient not taking: Reported on 8/25/2022   ondansetron (Zofran ODT) 4 mg disintegrating tablet   No No   Sig: Take 1 tablet (4 mg total) by mouth every 6 (six) hours as needed for nausea or vomiting   Patient not taking: Reported on 8/25/2022   predniSONE 10 mg tablet   Yes No   Sig: Take by mouth   Patient not taking: Reported on 8/25/2022   tiotropium (SPIRIVA RESPIMAT) 1.25 MCG/ACT AERS inhaler   Yes No   Sig: Inhale 2.5 mcg daily   Patient not taking: Reported on 8/25/2022   traZODone (DESYREL) 50 mg tablet   Yes No   Sig: Take 50 mg by mouth   triamcinolone (KENALOG) 0.1 % cream   No No   Sig: Apply topically 2 (two) times a day for 7 days      Facility-Administered Medications: None     Patient's Medications   Discharge Prescriptions    No medications on file     No discharge  procedures on file.  ED SEPSIS DOCUMENTATION   Time reflects when diagnosis was documented in both MDM as applicable and the Disposition within this note       Time User Action Codes Description Comment    1/14/2025  6:33 AM Jeffery Cameron Add [R07.9] Chest pain                  Jeffery Cameron MD  01/14/25 0644

## 2025-01-14 NOTE — ED ATTENDING ATTESTATION
1/14/2025  IJeffery MD, saw and evaluated the patient. I have discussed the patient with the resident/non-physician practitioner and agree with the resident's/non-physician practitioner's findings, Plan of Care, and MDM as documented in the resident's/non-physician practitioner's note, except where noted. All available labs and Radiology studies were reviewed.  I was present for key portions of any procedure(s) performed by the resident/non-physician practitioner and I was immediately available to provide assistance.       At this point I agree with the current assessment done in the Emergency Department.  I have conducted an independent evaluation of this patient a history and physical is as follows:    44-year-old male no significant past medical history presenting to the ED today for right-sided chest pain.  Woke him up from sleep about an hour prior to arrival.  Very focally point tender on the right side of his chest and between the ribs.  No nausea or vomiting.  No fevers or chills.  No recent travel.    Physical exam  Vital signs stable.  Regular rate and rhythm.  Clear to auscultation bilaterally.  Abdomen soft nontender.  Has point tenderness on the right fourth and fifth rib anteriorly mid clavicular line.    Plan: CBC, CMP, troponin, twelve-lead EKG, chest x-ray.  Dispo pending imaging and labs will likely discharge as most likely diagnosis costochondritis or other MSK chest wall pain.  Patient PERC negative.  No concern for PE at this time given negative PERC.      ED Course         Critical Care Time  Procedures

## 2025-01-21 ENCOUNTER — APPOINTMENT (OUTPATIENT)
Dept: LAB | Facility: CLINIC | Age: 45
End: 2025-01-21
Payer: COMMERCIAL

## 2025-01-21 DIAGNOSIS — N28.89 URETERAL FISTULA: ICD-10-CM

## 2025-01-21 DIAGNOSIS — N28.89 RENAL MASS: ICD-10-CM

## 2025-01-21 LAB
BACTERIA UR QL AUTO: ABNORMAL /HPF
BILIRUB UR QL STRIP: NEGATIVE
CLARITY UR: CLEAR
COLOR UR: ABNORMAL
GLUCOSE UR STRIP-MCNC: NEGATIVE MG/DL
HGB UR QL STRIP.AUTO: ABNORMAL
KETONES UR STRIP-MCNC: NEGATIVE MG/DL
LEUKOCYTE ESTERASE UR QL STRIP: NEGATIVE
NITRITE UR QL STRIP: NEGATIVE
NON-SQ EPI CELLS URNS QL MICRO: ABNORMAL /HPF
PH UR STRIP.AUTO: 5.5 [PH]
PROT UR STRIP-MCNC: NEGATIVE MG/DL
RBC #/AREA URNS AUTO: ABNORMAL /HPF
SP GR UR STRIP.AUTO: 1.02 (ref 1–1.03)
UROBILINOGEN UR STRIP-ACNC: <2 MG/DL
WBC #/AREA URNS AUTO: ABNORMAL /HPF

## 2025-01-21 PROCEDURE — 81001 URINALYSIS AUTO W/SCOPE: CPT

## 2025-03-20 ENCOUNTER — HOSPITAL ENCOUNTER (EMERGENCY)
Facility: HOSPITAL | Age: 45
Discharge: HOME | End: 2025-03-20
Attending: EMERGENCY MEDICINE
Payer: COMMERCIAL

## 2025-03-20 VITALS
SYSTOLIC BLOOD PRESSURE: 146 MMHG | RESPIRATION RATE: 18 BRPM | TEMPERATURE: 97 F | HEART RATE: 67 BPM | OXYGEN SATURATION: 99 % | DIASTOLIC BLOOD PRESSURE: 87 MMHG

## 2025-03-20 DIAGNOSIS — M62.838 MUSCLE SPASMS OF NECK: Primary | ICD-10-CM

## 2025-03-20 LAB
ATRIAL RATE: 74 BPM
P AXIS: 65 DEGREES
PR INTERVAL: 174 MS
QRS AXIS: 79 DEGREES
QRSD INTERVAL: 112 MS
QT INTERVAL: 370 MS
QTC INTERVAL: 411 MS
T WAVE AXIS: -8 DEGREES
VENTRICULAR RATE: 74 BPM

## 2025-03-20 PROCEDURE — 93005 ELECTROCARDIOGRAM TRACING: CPT

## 2025-03-20 PROCEDURE — 93010 ELECTROCARDIOGRAM REPORT: CPT | Performed by: INTERNAL MEDICINE

## 2025-03-20 PROCEDURE — 99283 EMERGENCY DEPT VISIT LOW MDM: CPT

## 2025-03-20 PROCEDURE — 99284 EMERGENCY DEPT VISIT MOD MDM: CPT | Performed by: EMERGENCY MEDICINE

## 2025-03-20 PROCEDURE — 96372 THER/PROPH/DIAG INJ SC/IM: CPT

## 2025-03-20 RX ORDER — DIAZEPAM 5 MG/1
5 TABLET ORAL ONCE
Status: COMPLETED | OUTPATIENT
Start: 2025-03-20 | End: 2025-03-20

## 2025-03-20 RX ORDER — METHOCARBAMOL 500 MG/1
500 TABLET, FILM COATED ORAL
Qty: 7 TABLET | Refills: 0 | Status: SHIPPED | OUTPATIENT
Start: 2025-03-20 | End: 2025-03-27

## 2025-03-20 RX ORDER — LIDOCAINE 50 MG/G
1 PATCH TOPICAL ONCE
Status: DISCONTINUED | OUTPATIENT
Start: 2025-03-20 | End: 2025-03-20 | Stop reason: HOSPADM

## 2025-03-20 RX ORDER — KETOROLAC TROMETHAMINE 30 MG/ML
15 INJECTION, SOLUTION INTRAMUSCULAR; INTRAVENOUS ONCE
Status: COMPLETED | OUTPATIENT
Start: 2025-03-20 | End: 2025-03-20

## 2025-03-20 RX ADMIN — LIDOCAINE 1 PATCH: 700 PATCH TOPICAL at 02:16

## 2025-03-20 RX ADMIN — KETOROLAC TROMETHAMINE 15 MG: 30 INJECTION, SOLUTION INTRAMUSCULAR; INTRAVENOUS at 02:15

## 2025-03-20 RX ADMIN — DIAZEPAM 5 MG: 5 TABLET ORAL at 02:15

## 2025-03-20 NOTE — ED ATTENDING ATTESTATION
3/20/2025  IMelvin MD, saw and evaluated the patient. I have discussed the patient with the resident/non-physician practitioner and agree with the resident's/non-physician practitioner's findings, Plan of Care, and MDM as documented in the resident's/non-physician practitioner's note, except where noted. All available labs and Radiology studies were reviewed.  I was present for key portions of any procedure(s) performed by the resident/non-physician practitioner and I was immediately available to provide assistance.       At this point I agree with the current assessment done in the Emergency Department.  I have conducted an independent evaluation of this patient a history and physical is as follows:      Final Diagnosis:  1. Muscle spasms of neck      Chief Complaint   Patient presents with    Neck Pain     C/o neck pain that started 30 mins ago. Denies injury. Reports dizziness prior to laying down to sleep tonight            A:  -45-year-old male who presents with neck pain.      P:  -Likely musculoskeletal.  Treat symptomatically with Toradol, Lidoderm patch, Valium.  Discharged with NSAIDs and Robaxin.      H:    45-year-old male who presents with neck pain.  Woke up with bilateral neck pain and lightheadedness.  Denies any inciting event or injury.      PMH:  Past Medical History:   Diagnosis Date    Asthma     Mitral valve prolapse        PSH:  Past Surgical History:   Procedure Laterality Date    ROTATOR CUFF REPAIR Bilateral     2020 + 2017         PE:   Vitals:    03/20/25 0131   BP: 146/87   BP Location: Left arm   Pulse: 67   Resp: 18   Temp: (!) 97 °F (36.1 °C)   TempSrc: Temporal   SpO2: 99%         Constitutional: Vital signs are normal. He appears well-developed. He is cooperative. No distress.   Pulmonary/Chest: Effort normal.   Abdominal: Normal appearance.   Neurological: He is alert.  Skin: Skin is warm, dry and intact.   MSK: Tenderness over bilateral trapezius muscles and bilateral  paraspinal cervical muscles.  Psychiatric: He has a normal mood and affect. His speech is normal and behavior is normal. Thought content normal.          - 13 point ROS was performed and all are normal unless stated in the history above.   - Nursing note reviewed. Vitals reviewed.   - Orders placed by myself and/or advanced practitioner / resident.    - Previous chart was reviewed  - No language barrier.   - History obtained from patient.   - There are no limitations to the history obtained. Reasons ROS could not be obtained:  N/A         Medications   lidocaine (LIDODERM) 5 % patch 1 patch (1 patch Topical Medication Applied 3/20/25 0216)   ketorolac (TORADOL) injection 15 mg (15 mg Intramuscular Given 3/20/25 0215)   diazepam (VALIUM) tablet 5 mg (5 mg Oral Given 3/20/25 0215)     No orders to display     Orders Placed This Encounter   Procedures    ECG 12 lead     Labs Reviewed - No data to display  Time reflects when diagnosis was documented in both MDM as applicable and the Disposition within this note       Time User Action Codes Description Comment    3/20/2025  3:35 AM Shanta Castano [M62.838] Muscle spasms of neck           ED Disposition       ED Disposition   Discharge    Condition   Stable    Date/Time   Thu Mar 20, 2025  3:35 AM    Comment   Jose CapoReyes discharge to home/self care.                   Follow-up Information       Follow up With Specialties Details Why Contact Info Additional Information    SIND[facility], Nurse Practitioner Go in 1 day for reevaluation [address]  [facility] [address]  [phone]       Critical access hospital Emergency Department Emergency Medicine Go to  If symptoms worsen [address] [facility] [address] [phone] Critical access hospital Emergency Department, [address], [place], [address] [phone]          Patient's Medications   Discharge Prescriptions    METHOCARBAMOL  (ROBAXIN) 500 MG TABLET    Take 1 tablet (500 mg total) by mouth daily at bedtime as needed for muscle spasms for up to 7 days       Start Date: 3/20/2025 End Date: 3/27/2025       Order Dose: 500 mg       Quantity: 7 tablet    Refills: 0     No discharge procedures on file.  Prior to Admission Medications   Prescriptions Last Dose Informant Patient Reported? Taking?   acetaminophen (TYLENOL) 500 mg tablet   No No   Sig: Take 1 tablet (500 mg total) by mouth every 6 (six) hours as needed for mild pain or moderate pain   Patient not taking: Reported on 8/25/2022   albuterol (2.5 mg/3 mL) 0.083 % nebulizer solution   No No   Sig: Take 3 mL (2.5 mg total) by nebulization every 6 (six) hours as needed for wheezing or shortness of breath   albuterol (PROVENTIL HFA,VENTOLIN HFA) 90 mcg/act inhaler   Yes No   Sig: Inhale 1 puff every 6 (six) hours as needed   albuterol (PROVENTIL HFA,VENTOLIN HFA) 90 mcg/act inhaler   No No   Sig: Inhale 2 puffs every 4 (four) hours as needed for wheezing   baclofen 10 mg tablet   No No   Sig: Take 1 tablet (10 mg total) by mouth 3 (three) times a day for 4 days   cetirizine (ZyrTEC) 10 mg tablet   No No   Sig: Take 1 tablet (10 mg total) by mouth daily   citalopram (CeleXA) 20 mg tablet   Yes No   Sig: Take 20 mg by mouth daily   Patient not taking: Reported on 8/25/2022   diphenhydrAMINE (BENADRYL) 25 mg tablet   No No   Sig: Take 1 tablet (25 mg total) by mouth every 6 (six) hours   docusate sodium (Colace) 100 mg capsule   No No   Sig: Take 1 capsule (100 mg total) by mouth 2 (two) times a day for 10 days   fluticasone-vilanterol (BREO ELLIPTA) 200-25 MCG/INH inhaler   Yes No   Sig: Inhale 1 puff daily   hydrocortisone-pramoxine (PROCTOFOAM-HC) 1-1 % FOAM rectal foam   No No   Sig: Insert 1 applicator into the rectum 2 (two) times a day for 7 days   meclizine (ANTIVERT) 25 mg tablet   No No   Sig: Take 1 tablet (25 mg total) by mouth 3 (three) times a day as needed for dizziness  "  methocarbamol (ROBAXIN) 500 mg tablet   No No   Sig: Take 1 tablet (500 mg total) by mouth 2 (two) times a day as needed for muscle spasms for up to 5 days   naproxen (NAPROSYN) 500 mg tablet   No No   Sig: Take 1 tablet (500 mg total) by mouth 2 (two) times a day with meals   Patient not taking: Reported on 8/25/2022   ondansetron (Zofran ODT) 4 mg disintegrating tablet   No No   Sig: Take 1 tablet (4 mg total) by mouth every 6 (six) hours as needed for nausea or vomiting   Patient not taking: Reported on 8/25/2022   predniSONE 10 mg tablet   Yes No   Sig: Take by mouth   Patient not taking: Reported on 8/25/2022   tiotropium (SPIRIVA RESPIMAT) 1.25 MCG/ACT AERS inhaler   Yes No   Sig: Inhale 2.5 mcg daily   Patient not taking: Reported on 8/25/2022   traZODone (DESYREL) 50 mg tablet   Yes No   Sig: Take 50 mg by mouth   triamcinolone (KENALOG) 0.1 % cream   No No   Sig: Apply topically 2 (two) times a day for 7 days      Facility-Administered Medications: None       Portions of the record may have been created with voice recognition software. Occasional wrong word or \"sound a like\" substitutions may have occurred due to the inherent limitations of voice recognition software. Read the chart carefully and recognize, using context, where substitutions have occurred.       ED Course         Critical Care Time  Procedures      "

## 2025-03-20 NOTE — DISCHARGE INSTRUCTIONS
Patient Education     Dolor en el latricia en el servicio de emergencias   ¿Qué cuidados se necesitan en casa?   Comuníquese con sampson médico de cabecera para comunicarle que estuvo en el servicio de emergencias. Programe margarette emigdio de seguimiento si así se lo pidieron.  Use sampson collarín o cojín kadi le indicaron. Si el médico le indicó que lo hiciera, puede comenzar a hacer estiramientos suaves del latricia en unos días.  Para las distensiones recientes, coloque margarette compresa de hielo o margarette bolsa de vegetales congelados envueltos en margarette toalla sobre la parte del cuerpo que le duela. Nunca coloque el hielo directamente sobre la piel. Utilice hielo cada 1 a 2 horas gaurav 10 a 15 minutos por vez. Utilícelo gaurav las primeras 24 a 48 horas después de margarette lesión.  Aplique calor después de las primeras 24 a 48 horas, sergio no de inmediato. Póngase margarette almohadilla térmica en la parte donde siente dolor por un jannet de 20 minutos a la vez. Nunca se vaya a dormir con margarette almohadilla térmica puesta, ya que esto puede causar quemaduras. También puede ducharse o bañarse.  Puede shelley medicamentos kadi ibuprofeno o naproxeno para la hinchazón y el dolor. Estos son medicamentos antiinflamatorios no esteroideos (ADIEL).  Trate de practicar margarette buena postura para evitar ejercer presión sobre sampson latricia. Siéntese derecho y mantenga los hombros hacia atrás. También puede ser útil evitar sentarse en la misma posición gaurav mucho tiempo y evitar ejercer presión sobre la parte superior de la espalda al cargar cosas pesadas. Cuando duerma, trate de mantener sampson latricia en línea con el cecilia de sampson cuerpo.  Call for Emergency Help   Llame a margarette ambulancia de inmediato si:   Sampson latricia se vuelve rígido y difícil de  y se siente enfermo o tiene fiebre o escalofríos.  Regrese al servicio de emergencias si:   Tiene margarette nueva sensación de debilidad en manuel de xander brazos.  ¿Cuándo bety llamar al médico?   Tiene un dolor muy tania que no mejora con  los analgésicos.  La mano o el brazo se hincha.  Tiene el brazo entumecido o con hormigueo.  Tiene síntomas nuevos o que empeoran.  Exención de responsabilidad y uso de la información del consumidor   Esta información general es un resumen limitado de la información sobre el diagnóstico, el tratamiento y/o la medicación. No pretende ser exhaustivo y debe utilizarse kadi margarette herramienta para ayudar al usuario a comprender y/o evaluar las posibles opciones de diagnóstico y tratamiento. NO incluye toda la información sobre las enfermedades, los tratamientos, los medicamentos, los efectos secundarios o los riesgos que pueden aplicarse a un paciente específico. No tiene por objeto ser un consejo médico ni un sustituto del consejo médico. Tampoco pretende reemplazar al diagnóstico o el tratamiento proporcionados por un proveedor de atención médica con base en el examen y la evaluación por parte de tracy proveedor de las circunstancias específicas y únicas de un paciente. Los pacientes deben hablar con un proveedor de atención médica para obtener información completa sobre sampson ambrocio, preguntas médicas y opciones de tratamiento, incluidos los riesgos o beneficios relacionados con el uso de medicamentos. Esta información no respalda ningún tratamiento o medicamento kadi seguro, eficaz o aprobado para tratar a un paciente específico. [employer] y xander afiliados renuncian a cualquier garantía o responsabilidad relacionada con esta información o con el uso que se lidya de esta. El uso de esta información se rige por las Condiciones de uso, disponibles en https://www.[employer].com/en/know/clinical-effectiveness-terms   Copyright   © 2024 "[employer], [employer]. Todos los derechos reservados.

## 2025-03-20 NOTE — ED PROVIDER NOTES
Time reflects when diagnosis was documented in both MDM as applicable and the Disposition within this note       Time User Action Codes Description Comment    3/20/2025  3:35 AM Shanta Castano Add [M62.838] Muscle spasms of neck           ED Disposition       ED Disposition   Discharge    Condition   Stable    Date/Time   Thu Mar 20, 2025  3:35 AM    Comment   Jose CapoReyes discharge to home/self care.                   Assessment & Plan       Medical Decision Making  Jose CapoReyes is a 45 y.o. who presents with complaints of BL neck pain and lightheadedness     Vital signs are HD stable, afebrile    Ddx: suspect MSK neck pain  Doubt acute cervical or intracranial pathology given no focal neurologic deficits    Plan: toradol and valium given with improvement in symptoms  Supportive care instructions and return precautions provided  Recommend PCP follow up  Prescribed short course of robaxin for home  Patient understands and is agreeable to plan    Disposition: patient stable for discharge.          Risk  Prescription drug management.             Medications   lidocaine (LIDODERM) 5 % patch 1 patch (1 patch Topical Medication Applied 3/20/25 0216)   ketorolac (TORADOL) injection 15 mg (15 mg Intramuscular Given 3/20/25 0215)   diazepam (VALIUM) tablet 5 mg (5 mg Oral Given 3/20/25 0215)       ED Risk Strat Scores                            SBIRT 20yo+      Flowsheet Row Most Recent Value   Initial Alcohol Screen: US AUDIT-C     1. How often do you have a drink containing alcohol? 0 Filed at: 03/20/2025 0132   2. How many drinks containing alcohol do you have on a typical day you are drinking?  0 Filed at: 03/20/2025 0132   3a. Male UNDER 65: How often do you have five or more drinks on one occasion? 0 Filed at: 03/20/2025 0132   3b. FEMALE Any Age, or MALE 65+: How often do you have 4 or more drinks on one occassion? 0 Filed at: 03/20/2025 0132   Audit-C Score 0 Filed at: 03/20/2025 0132   NELSON: How many times  in the past year have you...    Used an illegal drug or used a prescription medication for non-medical reasons? Never Filed at: 03/20/2025 0132                            History of Present Illness       Chief Complaint   Patient presents with    Neck Pain     C/o neck pain that started 30 mins ago. Denies injury. Reports dizziness prior to laying down to sleep tonight        Past Medical History:   Diagnosis Date    Asthma     Mitral valve prolapse       Past Surgical History:   Procedure Laterality Date    ROTATOR CUFF REPAIR Bilateral     2020 + 2017      History reviewed. No pertinent family history.   Social History     Tobacco Use    Smoking status: Never    Smokeless tobacco: Never   Vaping Use    Vaping status: Never Used   Substance Use Topics    Alcohol use: Not Currently     Comment: socially    Drug use: No      E-Cigarette/Vaping    E-Cigarette Use Never User       E-Cigarette/Vaping Substances    Nicotine No     THC No     CBD No     Flavoring No     Other No     Unknown No       I have reviewed and agree with the history as documented.     Patient is a 45-year-old male who presents for evaluation of neck pain.  Patient states that approximately 30 minutes ago, he awoke from sleep with bilateral neck pain.  States that when pain started he was feeling lightheaded but denies dizziness.  Denies chest pain, shortness of breath, palpitations, nausea, vomiting, fever, chills, headache, or visual changes.  Denies fall, neck injury, or other inciting event.  Took Tylenol prior to arrival without significant relief.        Review of Systems   All other systems reviewed and are negative.          Objective       ED Triage Vitals   Temperature Pulse Blood Pressure Respirations SpO2 Patient Position - Orthostatic VS   03/20/25 0131 03/20/25 0131 03/20/25 0131 03/20/25 0131 03/20/25 0131 03/20/25 0131   (!) 97 °F (36.1 °C) 67 146/87 18 99 % Sitting      Temp Source Heart Rate Source BP Location FiO2 (%) Pain Score     03/20/25 0131 03/20/25 0131 03/20/25 0131 -- 03/20/25 0132    Temporal Monitor Left arm  10 - Worst Possible Pain      Vitals      Date and Time Temp Pulse SpO2 Resp BP Pain Score FACES Pain Rating User   03/20/25 0215 -- -- -- -- -- 9 -- GR   03/20/25 0132 -- -- -- -- -- 10 - Worst Possible Pain --    03/20/25 0131 97 °F (36.1 °C) 67 99 % 18 146/87 -- --             Physical Exam  Constitutional:       General: He is not in acute distress.     Appearance: Normal appearance. He is not ill-appearing, toxic-appearing or diaphoretic.   HENT:      Head: Normocephalic and atraumatic.      Nose: Nose normal.      Mouth/Throat:      Mouth: Mucous membranes are moist.      Pharynx: Oropharynx is clear.   Eyes:      Extraocular Movements: Extraocular movements intact.      Conjunctiva/sclera: Conjunctivae normal.      Pupils: Pupils are equal, round, and reactive to light.   Cardiovascular:      Rate and Rhythm: Normal rate.   Pulmonary:      Effort: Pulmonary effort is normal.   Musculoskeletal:         General: Normal range of motion.      Cervical back: Normal range of motion and neck supple. Tenderness (BL paraspinal and trapezius muscle tenderness) present. No rigidity.   Skin:     General: Skin is warm and dry.   Neurological:      Mental Status: He is alert and oriented to person, place, and time.      Cranial Nerves: No cranial nerve deficit.      Sensory: No sensory deficit.      Motor: No weakness.      Coordination: Coordination normal.      Gait: Gait normal.   Psychiatric:         Mood and Affect: Mood normal.         Behavior: Behavior normal.         Results Reviewed       None            No orders to display       ECG 12 Lead Documentation Only    Date/Time: 3/20/2025 3:41 AM    Performed by: Shanta Castano MD  Authorized by: Shanta Castano MD    Indications / Diagnosis:  Lightheadedness  ECG reviewed by me, the ED Provider: yes    Patient location:  ED  Previous ECG:     Previous ECG:   Compared to current    Similarity:  No change  Rate:     ECG rate:  74    ECG rate assessment: normal    Rhythm:     Rhythm: sinus rhythm    Ectopy:     Ectopy: none    QRS:     QRS axis:  Normal    QRS intervals:  Normal  Conduction:     Conduction: normal    T waves:     T waves: inverted        ED Medication and Procedure Management   Prior to Admission Medications   Prescriptions Last Dose Informant Patient Reported? Taking?   acetaminophen (TYLENOL) 500 mg tablet   No No   Sig: Take 1 tablet (500 mg total) by mouth every 6 (six) hours as needed for mild pain or moderate pain   Patient not taking: Reported on 8/25/2022   albuterol (2.5 mg/3 mL) 0.083 % nebulizer solution   No No   Sig: Take 3 mL (2.5 mg total) by nebulization every 6 (six) hours as needed for wheezing or shortness of breath   albuterol (PROVENTIL HFA,VENTOLIN HFA) 90 mcg/act inhaler   Yes No   Sig: Inhale 1 puff every 6 (six) hours as needed   albuterol (PROVENTIL HFA,VENTOLIN HFA) 90 mcg/act inhaler   No No   Sig: Inhale 2 puffs every 4 (four) hours as needed for wheezing   baclofen 10 mg tablet   No No   Sig: Take 1 tablet (10 mg total) by mouth 3 (three) times a day for 4 days   cetirizine (ZyrTEC) 10 mg tablet   No No   Sig: Take 1 tablet (10 mg total) by mouth daily   citalopram (CeleXA) 20 mg tablet   Yes No   Sig: Take 20 mg by mouth daily   Patient not taking: Reported on 8/25/2022   diphenhydrAMINE (BENADRYL) 25 mg tablet   No No   Sig: Take 1 tablet (25 mg total) by mouth every 6 (six) hours   docusate sodium (Colace) 100 mg capsule   No No   Sig: Take 1 capsule (100 mg total) by mouth 2 (two) times a day for 10 days   fluticasone-vilanterol (BREO ELLIPTA) 200-25 MCG/INH inhaler   Yes No   Sig: Inhale 1 puff daily   hydrocortisone-pramoxine (PROCTOFOAM-HC) 1-1 % FOAM rectal foam   No No   Sig: Insert 1 applicator into the rectum 2 (two) times a day for 7 days   meclizine (ANTIVERT) 25 mg tablet   No No   Sig: Take 1 tablet (25 mg  total) by mouth 3 (three) times a day as needed for dizziness   methocarbamol (ROBAXIN) 500 mg tablet   No No   Sig: Take 1 tablet (500 mg total) by mouth 2 (two) times a day as needed for muscle spasms for up to 5 days   naproxen (NAPROSYN) 500 mg tablet   No No   Sig: Take 1 tablet (500 mg total) by mouth 2 (two) times a day with meals   Patient not taking: Reported on 8/25/2022   ondansetron (Zofran ODT) 4 mg disintegrating tablet   No No   Sig: Take 1 tablet (4 mg total) by mouth every 6 (six) hours as needed for nausea or vomiting   Patient not taking: Reported on 8/25/2022   predniSONE 10 mg tablet   Yes No   Sig: Take by mouth   Patient not taking: Reported on 8/25/2022   tiotropium (SPIRIVA RESPIMAT) 1.25 MCG/ACT AERS inhaler   Yes No   Sig: Inhale 2.5 mcg daily   Patient not taking: Reported on 8/25/2022   traZODone (DESYREL) 50 mg tablet   Yes No   Sig: Take 50 mg by mouth   triamcinolone (KENALOG) 0.1 % cream   No No   Sig: Apply topically 2 (two) times a day for 7 days      Facility-Administered Medications: None     Discharge Medication List as of 3/20/2025  3:36 AM        CONTINUE these medications which have CHANGED    Details   methocarbamol (ROBAXIN) 500 mg tablet Take 1 tablet (500 mg total) by mouth daily at bedtime as needed for muscle spasms for up to 7 days, Starting Thu 3/20/2025, Until Thu 3/27/2025 at 2359, Normal           CONTINUE these medications which have NOT CHANGED    Details   acetaminophen (TYLENOL) 500 mg tablet Take 1 tablet (500 mg total) by mouth every 6 (six) hours as needed for mild pain or moderate pain, Starting Tue 7/30/2019, Print      albuterol (2.5 mg/3 mL) 0.083 % nebulizer solution Take 3 mL (2.5 mg total) by nebulization every 6 (six) hours as needed for wheezing or shortness of breath, Starting Thu 3/28/2024, Normal      !! albuterol (PROVENTIL HFA,VENTOLIN HFA) 90 mcg/act inhaler Inhale 1 puff every 6 (six) hours as needed, Starting Mon 3/11/2019, Historical Med       !! albuterol (PROVENTIL HFA,VENTOLIN HFA) 90 mcg/act inhaler Inhale 2 puffs every 4 (four) hours as needed for wheezing, Starting Sat 4/17/2021, Normal      baclofen 10 mg tablet Take 1 tablet (10 mg total) by mouth 3 (three) times a day for 4 days, Starting Wed 9/25/2019, Until Sun 9/29/2019, Print      cetirizine (ZyrTEC) 10 mg tablet Take 1 tablet (10 mg total) by mouth daily, Starting Fri 12/23/2022, Normal      citalopram (CeleXA) 20 mg tablet Take 20 mg by mouth daily, Historical Med      diphenhydrAMINE (BENADRYL) 25 mg tablet Take 1 tablet (25 mg total) by mouth every 6 (six) hours, Starting Fri 12/23/2022, Normal      docusate sodium (Colace) 100 mg capsule Take 1 capsule (100 mg total) by mouth 2 (two) times a day for 10 days, Starting Wed 11/1/2023, Until Sat 11/11/2023, Normal      fluticasone-vilanterol (BREO ELLIPTA) 200-25 MCG/INH inhaler Inhale 1 puff daily, Starting Mon 8/20/2018, Historical Med      hydrocortisone-pramoxine (PROCTOFOAM-HC) 1-1 % FOAM rectal foam Insert 1 applicator into the rectum 2 (two) times a day for 7 days, Starting Wed 11/1/2023, Until Wed 11/8/2023, Normal      meclizine (ANTIVERT) 25 mg tablet Take 1 tablet (25 mg total) by mouth 3 (three) times a day as needed for dizziness, Starting Wed 6/15/2022, Normal      naproxen (NAPROSYN) 500 mg tablet Take 1 tablet (500 mg total) by mouth 2 (two) times a day with meals, Starting Wed 9/30/2020, Normal      ondansetron (Zofran ODT) 4 mg disintegrating tablet Take 1 tablet (4 mg total) by mouth every 6 (six) hours as needed for nausea or vomiting, Starting Wed 6/15/2022, Normal      predniSONE 10 mg tablet Take by mouth, Starting Wed 8/15/2012, Historical Med      tiotropium (SPIRIVA RESPIMAT) 1.25 MCG/ACT AERS inhaler Inhale 2.5 mcg daily, Starting Mon 8/20/2018, Historical Med      traZODone (DESYREL) 50 mg tablet Take 50 mg by mouth, Historical Med      triamcinolone (KENALOG) 0.1 % cream Apply topically 2 (two) times a day for  7 days, Starting Sun 10/10/2021, Until Sun 10/17/2021, Normal       !! - Potential duplicate medications found. Please discuss with provider.        No discharge procedures on file.  ED SEPSIS DOCUMENTATION   Time reflects when diagnosis was documented in both MDM as applicable and the Disposition within this note       Time User Action Codes Description Comment    3/20/2025  3:35 AM Shanta Castano Add [M62.838] Muscle spasms of neck                  Shanta Castano MD  03/20/25 0344

## 2025-04-23 ENCOUNTER — HOSPITAL ENCOUNTER (EMERGENCY)
Facility: HOSPITAL | Age: 45
Discharge: HOME | End: 2025-04-23
Attending: EMERGENCY MEDICINE
Payer: COMMERCIAL

## 2025-04-23 ENCOUNTER — APPOINTMENT (EMERGENCY)
Dept: RADIOLOGY | Facility: HOSPITAL | Age: 45
End: 2025-04-23
Payer: COMMERCIAL

## 2025-04-23 VITALS
TEMPERATURE: 99 F | RESPIRATION RATE: 20 BRPM | SYSTOLIC BLOOD PRESSURE: 138 MMHG | OXYGEN SATURATION: 100 % | HEART RATE: 82 BPM | DIASTOLIC BLOOD PRESSURE: 77 MMHG

## 2025-04-23 DIAGNOSIS — R07.9 CHEST PAIN: Primary | ICD-10-CM

## 2025-04-23 LAB
ANION GAP SERPL CALCULATED.3IONS-SCNC: 8 MMOL/L (ref 4–13)
ATRIAL RATE: 326 BPM
BASOPHILS # BLD AUTO: 0.05 THOUSANDS/ÂΜL (ref 0–0.1)
BASOPHILS NFR BLD AUTO: 1 % (ref 0–1)
BUN SERPL-MCNC: 14 MG/DL (ref 5–25)
CALCIUM SERPL-MCNC: 9.3 MG/DL (ref 8.4–10.2)
CARDIAC TROPONIN I PNL SERPL HS: 5 NG/L (ref ?–50)
CHLORIDE SERPL-SCNC: 105 MMOL/L (ref 96–108)
CO2 SERPL-SCNC: 26 MMOL/L (ref 21–32)
CREAT SERPL-MCNC: 0.89 MG/DL (ref 0.6–1.3)
EOSINOPHIL # BLD AUTO: 0.23 THOUSAND/ÂΜL (ref 0–0.61)
EOSINOPHIL NFR BLD AUTO: 3 % (ref 0–6)
ERYTHROCYTE [DISTWIDTH] IN BLOOD BY AUTOMATED COUNT: 11.9 % (ref 11.6–15.1)
GFR SERPL CREATININE-BSD FRML MDRD: 101 ML/MIN/1.73SQ M
GLUCOSE SERPL-MCNC: 103 MG/DL (ref 65–140)
HCT VFR BLD AUTO: 42.3 % (ref 36.5–49.3)
HGB BLD-MCNC: 14.3 G/DL (ref 12–17)
IMM GRANULOCYTES # BLD AUTO: 0.03 THOUSAND/UL (ref 0–0.2)
IMM GRANULOCYTES NFR BLD AUTO: 0 % (ref 0–2)
LYMPHOCYTES # BLD AUTO: 3.06 THOUSANDS/ÂΜL (ref 0.6–4.47)
LYMPHOCYTES NFR BLD AUTO: 36 % (ref 14–44)
MCH RBC QN AUTO: 32.4 PG (ref 26.8–34.3)
MCHC RBC AUTO-ENTMCNC: 33.8 G/DL (ref 31.4–37.4)
MCV RBC AUTO: 96 FL (ref 82–98)
MONOCYTES # BLD AUTO: 0.71 THOUSAND/ÂΜL (ref 0.17–1.22)
MONOCYTES NFR BLD AUTO: 8 % (ref 4–12)
NEUTROPHILS # BLD AUTO: 4.37 THOUSANDS/ÂΜL (ref 1.85–7.62)
NEUTS SEG NFR BLD AUTO: 52 % (ref 43–75)
NRBC BLD AUTO-RTO: 0 /100 WBCS
PLATELET # BLD AUTO: 293 THOUSANDS/UL (ref 149–390)
PMV BLD AUTO: 9.8 FL (ref 8.9–12.7)
POTASSIUM SERPL-SCNC: 3.4 MMOL/L (ref 3.5–5.3)
QRS AXIS: 93 DEGREES
QRSD INTERVAL: 106 MS
QT INTERVAL: 334 MS
QTC INTERVAL: 404 MS
RBC # BLD AUTO: 4.41 MILLION/UL (ref 3.88–5.62)
SODIUM SERPL-SCNC: 139 MMOL/L (ref 135–147)
T WAVE AXIS: -15 DEGREES
VENTRICULAR RATE: 88 BPM
WBC # BLD AUTO: 8.45 THOUSAND/UL (ref 4.31–10.16)

## 2025-04-23 PROCEDURE — 71046 X-RAY EXAM CHEST 2 VIEWS: CPT

## 2025-04-23 PROCEDURE — 84484 ASSAY OF TROPONIN QUANT: CPT

## 2025-04-23 PROCEDURE — 36415 COLL VENOUS BLD VENIPUNCTURE: CPT

## 2025-04-23 PROCEDURE — 96374 THER/PROPH/DIAG INJ IV PUSH: CPT

## 2025-04-23 PROCEDURE — 99285 EMERGENCY DEPT VISIT HI MDM: CPT

## 2025-04-23 PROCEDURE — 99285 EMERGENCY DEPT VISIT HI MDM: CPT | Performed by: EMERGENCY MEDICINE

## 2025-04-23 PROCEDURE — 93010 ELECTROCARDIOGRAM REPORT: CPT | Performed by: INTERNAL MEDICINE

## 2025-04-23 PROCEDURE — 80048 BASIC METABOLIC PNL TOTAL CA: CPT

## 2025-04-23 PROCEDURE — 85025 COMPLETE CBC W/AUTO DIFF WBC: CPT

## 2025-04-23 PROCEDURE — 93005 ELECTROCARDIOGRAM TRACING: CPT

## 2025-04-23 RX ORDER — ACETAMINOPHEN 325 MG/1
650 TABLET ORAL ONCE
Status: COMPLETED | OUTPATIENT
Start: 2025-04-23 | End: 2025-04-23

## 2025-04-23 RX ORDER — LIDOCAINE 50 MG/G
1 PATCH TOPICAL ONCE
Status: DISCONTINUED | OUTPATIENT
Start: 2025-04-23 | End: 2025-04-23 | Stop reason: HOSPADM

## 2025-04-23 RX ORDER — KETOROLAC TROMETHAMINE 30 MG/ML
15 INJECTION, SOLUTION INTRAMUSCULAR; INTRAVENOUS ONCE
Status: COMPLETED | OUTPATIENT
Start: 2025-04-23 | End: 2025-04-23

## 2025-04-23 RX ORDER — ONDANSETRON 2 MG/ML
1 INJECTION INTRAMUSCULAR; INTRAVENOUS ONCE
Status: COMPLETED | OUTPATIENT
Start: 2025-04-23 | End: 2025-04-23

## 2025-04-23 RX ADMIN — ACETAMINOPHEN 650 MG: 325 TABLET, FILM COATED ORAL at 05:39

## 2025-04-23 RX ADMIN — LIDOCAINE 1 PATCH: 50 PATCH CUTANEOUS at 05:39

## 2025-04-23 RX ADMIN — KETOROLAC TROMETHAMINE 15 MG: 30 INJECTION, SOLUTION INTRAMUSCULAR at 05:41

## 2025-04-23 NOTE — ED ATTENDING ATTESTATION
4/23/2025  I, Melvin Miller MD, saw and evaluated the patient. I have discussed the patient with the resident/non-physician practitioner and agree with the resident's/non-physician practitioner's findings, Plan of Care, and MDM as documented in the resident's/non-physician practitioner's note, except where noted. All available labs and Radiology studies were reviewed.  I was present for key portions of any procedure(s) performed by the resident/non-physician practitioner and I was immediately available to provide assistance.       At this point I agree with the current assessment done in the Emergency Department.  I have conducted an independent evaluation of this patient a history and physical is as follows:    Final Diagnosis:  1. Chest pain      Chief Complaint   Patient presents with    Chest Pain     Pt stared with sharp pain around right pectoral/axillary. Tender upon palpation and with movement. Minimal relief after 1 sublingual nitro, ASA, and Zofran. Reports mild SOB.           A:  - 45-year-old male who presents with chest pain.      P:  - Highly doubt ACS.  Likely costochondritis.  - Given patient's concerns, will do a cardiac workup.   - Will do an EKG for arrythmia, strain; troponin for same as per protocol for evaluation of ACS.   - CBC for anemia; CMP for kidney function and electrolytes.   - Will check CXR for pneumonia, PTX, fluid overload    HEART score:  History 0=Slightly or non-suspicious   ECG 1=Nonspecific repolarization disturbance   Age 1= > 45 - <65 years   Risk Factors 0= No risk factors known   Troponin 0= Less than or equal to 12 ng/L   Total 2     -Treat costochondritis with IV Toradol, p.o. Tylenol, Lidoderm patch.    - Disposition per workup.     Past Medical History:   Diagnosis Date    Asthma     Mitral valve prolapse           H:    45-year-old male who presents with chest pain.  Started around 3:30 AM this morning when he woke up to use the bathroom.  Pain is located on the  right side of his chest.  Made worse with palpation and moving his right arm.  Also made worse with deep inspiration.  Has experienced similar pain in the past.      PMH:  Past Medical History:   Diagnosis Date    Asthma     Mitral valve prolapse        PSH:  Past Surgical History:   Procedure Laterality Date    ROTATOR CUFF REPAIR Bilateral     2020 + 2017         PE:   Vitals:    04/23/25 0515 04/23/25 0600   BP: 142/68 138/77   BP Location: Right arm Right arm   Pulse: 86 82   Resp: 18 20   Temp: 99 °F (37.2 °C)    TempSrc: Oral    SpO2: 99% 100%         Constitutional: Vital signs are normal. He appears well-developed. He is cooperative. No distress.   Pulmonary/Chest: Effort normal.   Abdominal: Normal appearance.   Neurological: He is alert.  Skin: Skin is warm, dry and intact.   Psychiatric: He has a normal mood and affect. His speech is normal and behavior is normal. Thought content normal.          - 13 point ROS was performed and all are normal unless stated in the history above.   - Nursing note reviewed. Vitals reviewed.   - Orders placed by myself and/or advanced practitioner / resident.    - Previous chart was reviewed  - No language barrier.   - History obtained from patient.   - There are no limitations to the history obtained. Reasons ROS could not be obtained:  N/A         Medications   lidocaine (LIDODERM) 5 % patch 1 patch (1 patch Topical Medication Applied 4/23/25 0539)   ondansetron (FOR EMS ONLY) (ZOFRAN) 4 mg/2 mL injection 4 mg (0 mg Does not apply Given to EMS 4/23/25 0516)   acetaminophen (TYLENOL) tablet 650 mg (650 mg Oral Given 4/23/25 0539)   ketorolac (TORADOL) injection 15 mg (15 mg Intravenous Given 4/23/25 0541)     XR chest 2 views   ED Interpretation   No acute cardiopulmonary disease as interpreted by myself.        Orders Placed This Encounter   Procedures    XR chest 2 views    CBC and differential    Basic metabolic panel    HS Troponin 0hr (reflex protocol)    ECG 12 lead  "    Labs Reviewed   BASIC METABOLIC PANEL - Abnormal       Result Value Ref Range Status    Sodium 139  135 - 147 mmol/L Final    Potassium 3.4 (*) 3.5 - 5.3 mmol/L Final    Chloride 105  96 - 108 mmol/L Final    CO2 26  21 - 32 mmol/L Final    ANION GAP 8  4 - 13 mmol/L Final    BUN 14  5 - 25 mg/dL Final    Creatinine 0.89  0.60 - 1.30 mg/dL Final    Comment: Standardized to IDMS reference method    Glucose 103  65 - 140 mg/dL Final    Comment: If the patient is fasting, the ADA then defines impaired fasting glucose as > 100 mg/dL and diabetes as > or equal to 123 mg/dL.    Calcium 9.3  8.4 - 10.2 mg/dL Final    eGFR 101  ml/min/1.73sq m Final    Narrative:     National Kid[employer] Foundation guidelines for Chronic Kidney Disease (CKD):     Stage 1 with normal or high GFR (GFR > 90 mL/min/1.73 square meters)    Stage 2 Mild CKD (GFR = 60-89 mL/min/1.73 square meters)    Stage 3A Moderate CKD (GFR = 45-59 mL/min/1.73 square meters)    Stage 3B Moderate CKD (GFR = 30-44 mL/min/1.73 square meters)    Stage 4 Severe CKD (GFR = 15-29 mL/min/1.73 square meters)    Stage 5 End Stage CKD (GFR <15 mL/min/1.73 square meters)  Note: GFR calculation is accurate only with a steady state creatinine   HS TROPONIN I 0HR - Normal    hs TnI 0hr 5  \"Refer to ACS Flowchart\"- see link ng/L Final    Comment:                                              Initial (time 0) result  If >=50 ng/L, Myocardial injury suggested ;  Type of myocardial injury and treatment strategy  to be determined.  If 5-49 ng/L, a delta result at 2 hours and or 4 hours will be needed to further evaluate.  If <4 ng/L, and chest pain has been >3 hours since onset, patient may qualify for discharge based on the HEART score in the ED.  If <5 ng/L and <3hours since onset of chest pain, a delta result at 2 hours will be needed to further evaluate.    HS Troponin 99th Percentile URL of a Health Population=12 ng/L with a 95% Confidence Interval of 8-18 " ng/L.    Second Troponin (time 2 hours)  If calculated delta >= 20 ng/L,  Myocardial injury suggested ; Type of myocardial injury and treatment strategy to be determined.  If 5-49 ng/L and the calculated delta is 5-19 ng/L, consult medical service for evaluation.  Continue evaluation for ischemia on ecg and other possible etiology and repeat hs troponin at 4 hours.  If delta is <5 ng/L at 2 hours, consider discharge based on risk stratification via the HEART score (if in ED), or YVROSE risk score in IP/Observation.    HS Troponin 99th Percentile URL of a Health Population=12 ng/L with a 95% Confidence Interval of 8-18 ng/L.   CBC AND DIFFERENTIAL    WBC 8.45  4.31 - 10.16 Thousand/uL Final    RBC 4.41  3.88 - 5.62 Million/uL Final    Hemoglobin 14.3  12.0 - 17.0 g/dL Final    Hematocrit 42.3  36.5 - 49.3 % Final    MCV 96  82 - 98 fL Final    MCH 32.4  26.8 - 34.3 pg Final    MCHC 33.8  31.4 - 37.4 g/dL Final    RDW 11.9  11.6 - 15.1 % Final    MPV 9.8  8.9 - 12.7 fL Final    Platelets 293  149 - 390 Thousands/uL Final    nRBC 0  /100 WBCs Final    Segmented % 52  43 - 75 % Final    Immature Grans % 0  0 - 2 % Final    Lymphocytes % 36  14 - 44 % Final    Monocytes % 8  4 - 12 % Final    Eosinophils Relative 3  0 - 6 % Final    Basophils Relative 1  0 - 1 % Final    Absolute Neutrophils 4.37  1.85 - 7.62 Thousands/µL Final    Absolute Immature Grans 0.03  0.00 - 0.20 Thousand/uL Final    Absolute Lymphocytes 3.06  0.60 - 4.47 Thousands/µL Final    Absolute Monocytes 0.71  0.17 - 1.22 Thousand/µL Final    Eosinophils Absolute 0.23  0.00 - 0.61 Thousand/µL Final    Basophils Absolute 0.05  0.00 - 0.10 Thousands/µL Final     Time reflects when diagnosis was documented in both MDM as applicable and the Disposition within this note       Time User Action Codes Description Comment    4/23/2025  6:22 AM Jim Roberts [R07.9] Chest pain           ED Disposition       ED Disposition   Discharge    Condition   Stable     Date/Time   Wed Apr 23, 2025  6:35 AM    Comment   Jose Capo Reyes discharge to home/self care.                   Follow-up Information       Follow up With Specialties Details Why Contact Info Additional Information    [facility], Nurse Practitioner   [address]  [facility] [address]  [phone]       [facility] Emergency Department Emergency Medicine Go to  If symptoms worsen [address] [address] [phone] [facility] Emergency Department, [address] [place], [place], [address] [phone]          Patient's Medications   Discharge Prescriptions    No medications on file     No discharge procedures on file.  Prior to Admission Medications   Prescriptions Last Dose Informant Patient Reported? Taking?   acetaminophen (TYLENOL) 500 mg tablet   No No   Sig: Take 1 tablet (500 mg total) by mouth every 6 (six) hours as needed for mild pain or moderate pain   Patient not taking: Reported on 8/25/2022   albuterol (2.5 mg/3 mL) 0.083 % nebulizer solution   No No   Sig: Take 3 mL (2.5 mg total) by nebulization every 6 (six) hours as needed for wheezing or shortness of breath   albuterol (PROVENTIL HFA,VENTOLIN HFA) 90 mcg/act inhaler   Yes No   Sig: Inhale 1 puff every 6 (six) hours as needed   albuterol (PROVENTIL HFA,VENTOLIN HFA) 90 mcg/act inhaler   No No   Sig: Inhale 2 puffs every 4 (four) hours as needed for wheezing   baclofen 10 mg tablet   No No   Sig: Take 1 tablet (10 mg total) by mouth 3 (three) times a day for 4 days   cetirizine (ZyrTEC) 10 mg tablet   No No   Sig: Take 1 tablet (10 mg total) by mouth daily   citalopram (CeleXA) 20 mg tablet   Yes No   Sig: Take 20 mg by mouth daily   Patient not taking: Reported on 8/25/2022   diphenhydrAMINE (BENADRYL) 25 mg tablet   No No   Sig: Take 1 tablet (25 mg total) by mouth every 6 (six) hours   docusate sodium (Colace) 100 mg capsule   No No  "  Sig: Take 1 capsule (100 mg total) by mouth 2 (two) times a day for 10 days   fluticasone-vilanterol (BREO ELLIPTA) 200-25 MCG/INH inhaler   Yes No   Sig: Inhale 1 puff daily   hydrocortisone-pramoxine (PROCTOFOAM-HC) 1-1 % FOAM rectal foam   No No   Sig: Insert 1 applicator into the rectum 2 (two) times a day for 7 days   meclizine (ANTIVERT) 25 mg tablet   No No   Sig: Take 1 tablet (25 mg total) by mouth 3 (three) times a day as needed for dizziness   methocarbamol (ROBAXIN) 500 mg tablet   No No   Sig: Take 1 tablet (500 mg total) by mouth daily at bedtime as needed for muscle spasms for up to 7 days   naproxen (NAPROSYN) 500 mg tablet   No No   Sig: Take 1 tablet (500 mg total) by mouth 2 (two) times a day with meals   Patient not taking: Reported on 8/25/2022   ondansetron (Zofran ODT) 4 mg disintegrating tablet   No No   Sig: Take 1 tablet (4 mg total) by mouth every 6 (six) hours as needed for nausea or vomiting   Patient not taking: Reported on 8/25/2022   predniSONE 10 mg tablet   Yes No   Sig: Take by mouth   Patient not taking: Reported on 8/25/2022   tiotropium (SPIRIVA RESPIMAT) 1.25 MCG/ACT AERS inhaler   Yes No   Sig: Inhale 2.5 mcg daily   Patient not taking: Reported on 8/25/2022   traZODone (DESYREL) 50 mg tablet   Yes No   Sig: Take 50 mg by mouth   triamcinolone (KENALOG) 0.1 % cream   No No   Sig: Apply topically 2 (two) times a day for 7 days      Facility-Administered Medications: None       Portions of the record may have been created with voice recognition software. Occasional wrong word or \"sound a like\" substitutions may have occurred due to the inherent limitations of voice recognition software. Read the chart carefully and recognize, using context, where substitutions have occurred.       ED Course         Critical Care Time  Procedures      "

## 2025-04-23 NOTE — ED PROVIDER NOTES
"Time reflects when diagnosis was documented in both MDM as applicable and the Disposition within this note       Time User Action Codes Description Comment    4/23/2025  6:22 AM ArmandoTaylor fernándezshua Add [R07.9] Chest pain           ED Disposition       ED Disposition   Discharge    Condition   Stable    Date/Time   Wed Apr 23, 2025  6:35 AM    Comment   Jose Capo Reyes discharge to home/self care.                   Assessment & Plan       Medical Decision Making  Patient is a 45 y.o. male with PMH of asthma and mitral valve prolapse who presents to the ED with chest pain.    Vital signs stable. Physical exam as above.    History and physical exam most consistent with costochondritis. However, differential diagnosis included but not limited to ACS, myocarditis, pericarditis,, pneumothorax, bronchitis.     Plan: We will order CBC, BMP, troponin, EKG, chest x-ray patient will be given symptomatic pain relief.    View ED course above for further discussion on patient workup.     On review of previous records, patient was seen on 1/14/2025 for similar pain.  Visit note reviewed.    All labs reviewed and utilized in the medical decision making process  All radiology studies independently viewed by me and interpreted by the radiologist.  I reviewed all testing with the patient.     Upon re-evaluation, patient noted improvement of symptoms with medication.    Disposition: Patient discharged in stable condition.  Patient given strict return precautions.  Patient will follow up with PCP in the next few days for reevaluation.  Patient will continue to use ibuprofen and Tylenol at home for pain control.    Portions of the record may have been created with voice recognition software. Occasional wrong word or \"sound a like\" substitutions may have occurred due to the inherent limitations of voice recognition software. Read the chart carefully and recognize, using context, where substitutions have occurred.     Amount and/or Complexity " of Data Reviewed  Labs: ordered. Decision-making details documented in ED Course.  Radiology: ordered and independent interpretation performed. Decision-making details documented in ED Course.  ECG/medicine tests:  Decision-making details documented in ED Course.    Risk  OTC drugs.  Prescription drug management.        ED Course as of 04/24/25 2000 Wed Apr 23, 2025   0536 ECG 12 lead  Procedure Note: EKG  Date/Time: 04/23/25 5:36 AM   Interpreted by: KRYSTAL VALLE D.O.  Indications / Diagnosis: chest pain  ECG reviewed by me, the ED Provider: yes   The EKG demonstrates: normal EKG, normal sinus rhythm, right axis, unchanged from previous tracings  Rhythm: normal sinus  Intervals: normal intervals  Axis: right axis  QRS/Blocks: normal QRS  ST Changes: No acute ST Changes, no STD/JAIMIE.    0555 CBC and differential  Reassuring   0616 hs TnI 0hr: 5  Reassuring   0622 Basic metabolic panel(!)  Reassuring   0633 XR chest 2 views  No acute cardiopulmonary disease on my read   0633 Patient reevaluated, patient notes improvement of pain with medications.       Medications   ondansetron (FOR EMS ONLY) (ZOFRAN) 4 mg/2 mL injection 4 mg (0 mg Does not apply Given to EMS 4/23/25 0516)   acetaminophen (TYLENOL) tablet 650 mg (650 mg Oral Given 4/23/25 0539)   ketorolac (TORADOL) injection 15 mg (15 mg Intravenous Given 4/23/25 0541)       ED Risk Strat Scores   HEART Risk Score      Flowsheet Row Most Recent Value   Heart Score Risk Calculator    History 0 Filed at: 04/23/2025 0619   ECG 0 Filed at: 04/23/2025 0619   Age 1 Filed at: 04/23/2025 0619   Risk Factors 1 Filed at: 04/23/2025 0619   Troponin 0 Filed at: 04/23/2025 0619   HEART Score 2 Filed at: 04/23/2025 0619          HEART Risk Score      Flowsheet Row Most Recent Value   Heart Score Risk Calculator    History 0 Filed at: 04/23/2025 0619   ECG 0 Filed at: 04/23/2025 0619   Age 1 Filed at: 04/23/2025 0619   Risk Factors 1 Filed at: 04/23/2025 0619   Troponin 0 Filed  at: 04/23/2025 0619   HEART Score 2 Filed at: 04/23/2025 0619                      No data recorded        SBIRT 20yo+      Flowsheet Row Most Recent Value   Initial Alcohol Screen: US AUDIT-C     1. How often do you have a drink containing alcohol? 0 Filed at: 04/23/2025 0517   Audit-C Score 0 Filed at: 04/23/2025 0517   NELSON: How many times in the past year have you...    Used an illegal drug or used a prescription medication for non-medical reasons? Never Filed at: 04/23/2025 0517                            History of Present Illness       Chief Complaint   Patient presents with    Chest Pain     Pt stared with sharp pain around right pectoral/axillary. Tender upon palpation and with movement. Minimal relief after 1 sublingual nitro, ASA, and Zofran. Reports mild SOB.       Past Medical History:   Diagnosis Date    Asthma     Mitral valve prolapse       Past Surgical History:   Procedure Laterality Date    ROTATOR CUFF REPAIR Bilateral     2020 + 2017      History reviewed. No pertinent family history.   Social History     Tobacco Use    Smoking status: Never    Smokeless tobacco: Never   Vaping Use    Vaping status: Never Used   Substance Use Topics    Alcohol use: Not Currently     Comment: socially    Drug use: No      E-Cigarette/Vaping    E-Cigarette Use Never User       E-Cigarette/Vaping Substances    Nicotine No     THC No     CBD No     Flavoring No     Other No     Unknown No       I have reviewed and agree with the history as documented.     Patient is a 45-year-old male with a past medical history of asthma and mitral valve prolapse who presents today with chest pain.  The patient states that he got up to go to the bathroom this morning at about 3:30 AM and when he was getting back into bed he started to have pain in the right side of his chest.  Patient states the pain is worse when he pushes on his chest, and moves his arm.  Patient states that he also has worsening of the pain with deep breaths.   Patient states that he has not taken anything for the pain.  Patient denies any other associated symptoms including fevers, chills, abdominal pain, nausea, vomiting, diarrhea, sedation, headache, vision changes.  Patient denies any recent surgeries, long travel, swelling in his lower extremities.        Review of Systems   Constitutional:  Negative for chills and fever.   HENT:  Negative for ear pain and sore throat.    Eyes:  Negative for pain and visual disturbance.   Respiratory:  Positive for shortness of breath (Mild, due to the pain). Negative for cough and wheezing.    Cardiovascular:  Positive for chest pain. Negative for palpitations.   Gastrointestinal:  Negative for abdominal pain and vomiting.   Genitourinary:  Negative for dysuria and hematuria.   Musculoskeletal:  Negative for arthralgias and back pain.   Skin:  Negative for color change and rash.   Neurological:  Negative for seizures and syncope.   All other systems reviewed and are negative.          Objective       ED Triage Vitals [04/23/25 0515]   Temperature Pulse Blood Pressure Respirations SpO2 Patient Position - Orthostatic VS   99 °F (37.2 °C) 86 142/68 18 99 % Sitting      Temp Source Heart Rate Source BP Location FiO2 (%) Pain Score    Oral Monitor Right arm -- 7      Vitals      Date and Time Temp Pulse SpO2 Resp BP Pain Score FACES Pain Rating User   04/23/25 0600 -- 82 100 % 20 138/77 -- -- MS   04/23/25 0515 99 °F (37.2 °C) 86 99 % 18 142/68 7 -- MS            Physical Exam  Vitals and nursing note reviewed.   Constitutional:       General: He is not in acute distress.     Appearance: He is well-developed. He is not ill-appearing.   HENT:      Head: Normocephalic and atraumatic.      Nose: Nose normal.      Mouth/Throat:      Mouth: Mucous membranes are moist.      Pharynx: Oropharynx is clear.   Eyes:      Extraocular Movements: Extraocular movements intact.      Conjunctiva/sclera: Conjunctivae normal.      Pupils: Pupils are equal,  round, and reactive to light.   Cardiovascular:      Rate and Rhythm: Normal rate and regular rhythm.      Pulses: Normal pulses.      Heart sounds: Normal heart sounds. No murmur heard.     No friction rub. No gallop.   Pulmonary:      Effort: Pulmonary effort is normal. No respiratory distress.      Breath sounds: Normal breath sounds. No stridor. No wheezing, rhonchi or rales.   Chest:      Chest wall: Tenderness (Right-sided) present.   Abdominal:      General: Abdomen is flat. Bowel sounds are normal. There is no distension.      Palpations: Abdomen is soft. There is no mass.      Tenderness: There is no abdominal tenderness. There is no guarding or rebound.   Musculoskeletal:         General: No swelling. Normal range of motion.      Cervical back: Normal range of motion and neck supple.   Skin:     General: Skin is warm and dry.      Capillary Refill: Capillary refill takes less than 2 seconds.   Neurological:      General: No focal deficit present.      Mental Status: He is alert and oriented to person, place, and time.   Psychiatric:         Mood and Affect: Mood normal.         Results Reviewed       Procedure Component Value Units Date/Time    Basic metabolic panel [[id number]]  (Abnormal) Collected: 04/23/25 0542    Lab Status: Final result Specimen: Blood from Arm, Left Updated: 04/23/25 0622     Sodium 139 mmol/L      Potassium 3.4 mmol/L      Chloride 105 mmol/L      CO2 26 mmol/L      ANION GAP 8 mmol/L      BUN 14 mg/dL      Creatinine 0.89 mg/dL      Glucose 103 mg/dL      Calcium 9.3 mg/dL      eGFR 101 ml/min/1.73sq m     Narrative:      National Kid[employer] Foundation guidelines for Chronic Kidney Disease (CKD):     Stage 1 with normal or high GFR (GFR > 90 mL/min/1.73 square meters)    Stage 2 Mild CKD (GFR = 60-89 mL/min/1.73 square meters)    Stage 3A Moderate CKD (GFR = 45-59 mL/min/1.73 square meters)    Stage 3B Moderate CKD (GFR = 30-44 mL/min/1.73 square meters)    Stage 4 Severe CKD  (GFR = 15-29 mL/min/1.73 square meters)    Stage 5 End Stage CKD (GFR <15 mL/min/1.73 square meters)  Note: GFR calculation is accurate only with a steady state creatinine    HS Troponin 0hr (reflex protocol) [[id number]]  (Normal) Collected: 04/23/25 0542    Lab Status: Final result Specimen: Blood from Arm, Left Updated: 04/23/25 0616     hs TnI 0hr 5 ng/L     CBC and differential [[id number]] Collected: 04/23/25 0542    Lab Status: Final result Specimen: Blood from Arm, Left Updated: 04/23/25 0552     WBC 8.45 Thousand/uL      RBC 4.41 Million/uL      Hemoglobin 14.3 g/dL      Hematocrit 42.3 %      MCV 96 fL      MCH 32.4 pg      MCHC 33.8 g/dL      RDW 11.9 %      MPV 9.8 fL      Platelets 293 Thousands/uL      nRBC 0 /100 WBCs      Segmented % 52 %      Immature Grans % 0 %      Lymphocytes % 36 %      Monocytes % 8 %      Eosinophils Relative 3 %      Basophils Relative 1 %      Absolute Neutrophils 4.37 Thousands/µL      Absolute Immature Grans 0.03 Thousand/uL      Absolute Lymphocytes 3.06 Thousands/µL      Absolute Monocytes 0.71 Thousand/µL      Eosinophils Absolute 0.23 Thousand/µL      Basophils Absolute 0.05 Thousands/µL             XR chest 2 views   ED Interpretation by Melvin Miller MD (04/23 0636)   No acute cardiopulmonary disease as interpreted by myself.      Final Interpretation by Kaden Velásquez MD (04/23 0942)      No acute cardiopulmonary disease.            Workstation performed: [id number]             Procedures    ED Medication and Procedure Management   Prior to Admission Medications   Prescriptions Last Dose Informant Patient Reported? Taking?   acetaminophen (TYLENOL) 500 mg tablet   No No   Sig: Take 1 tablet (500 mg total) by mouth every 6 (six) hours as needed for mild pain or moderate pain   Patient not taking: Reported on 8/25/2022   albuterol (2.5 mg/3 mL) 0.083 % nebulizer solution   No No   Sig: Take 3 mL (2.5 mg total) by nebulization every 6 (six) hours as needed for  wheezing or shortness of breath   albuterol (PROVENTIL HFA,VENTOLIN HFA) 90 mcg/act inhaler   Yes No   Sig: Inhale 1 puff every 6 (six) hours as needed   albuterol (PROVENTIL HFA,VENTOLIN HFA) 90 mcg/act inhaler   No No   Sig: Inhale 2 puffs every 4 (four) hours as needed for wheezing   baclofen 10 mg tablet   No No   Sig: Take 1 tablet (10 mg total) by mouth 3 (three) times a day for 4 days   cetirizine (ZyrTEC) 10 mg tablet   No No   Sig: Take 1 tablet (10 mg total) by mouth daily   citalopram (CeleXA) 20 mg tablet   Yes No   Sig: Take 20 mg by mouth daily   Patient not taking: Reported on 8/25/2022   diphenhydrAMINE (BENADRYL) 25 mg tablet   No No   Sig: Take 1 tablet (25 mg total) by mouth every 6 (six) hours   docusate sodium (Colace) 100 mg capsule   No No   Sig: Take 1 capsule (100 mg total) by mouth 2 (two) times a day for 10 days   fluticasone-vilanterol (BREO ELLIPTA) 200-25 MCG/INH inhaler   Yes No   Sig: Inhale 1 puff daily   hydrocortisone-pramoxine (PROCTOFOAM-HC) 1-1 % FOAM rectal foam   No No   Sig: Insert 1 applicator into the rectum 2 (two) times a day for 7 days   meclizine (ANTIVERT) 25 mg tablet   No No   Sig: Take 1 tablet (25 mg total) by mouth 3 (three) times a day as needed for dizziness   methocarbamol (ROBAXIN) 500 mg tablet   No No   Sig: Take 1 tablet (500 mg total) by mouth daily at bedtime as needed for muscle spasms for up to 7 days   naproxen (NAPROSYN) 500 mg tablet   No No   Sig: Take 1 tablet (500 mg total) by mouth 2 (two) times a day with meals   Patient not taking: Reported on 8/25/2022   ondansetron (Zofran ODT) 4 mg disintegrating tablet   No No   Sig: Take 1 tablet (4 mg total) by mouth every 6 (six) hours as needed for nausea or vomiting   Patient not taking: Reported on 8/25/2022   predniSONE 10 mg tablet   Yes No   Sig: Take by mouth   Patient not taking: Reported on 8/25/2022   tiotropium (SPIRIVA RESPIMAT) 1.25 MCG/ACT AERS inhaler   Yes No   Sig: Inhale 2.5 mcg daily    Patient not taking: Reported on 8/25/2022   traZODone (DESYREL) 50 mg tablet   Yes No   Sig: Take 50 mg by mouth   triamcinolone (KENALOG) 0.1 % cream   No No   Sig: Apply topically 2 (two) times a day for 7 days      Facility-Administered Medications: None     Discharge Medication List as of 4/23/2025  6:35 AM        CONTINUE these medications which have NOT CHANGED    Details   acetaminophen (TYLENOL) 500 mg tablet Take 1 tablet (500 mg total) by mouth every 6 (six) hours as needed for mild pain or moderate pain, Starting Tue 7/30/2019, Print      albuterol (2.5 mg/3 mL) 0.083 % nebulizer solution Take 3 mL (2.5 mg total) by nebulization every 6 (six) hours as needed for wheezing or shortness of breath, Starting Thu 3/28/2024, Normal      !! albuterol (PROVENTIL HFA,VENTOLIN HFA) 90 mcg/act inhaler Inhale 1 puff every 6 (six) hours as needed, Starting Mon 3/11/2019, Historical Med      !! albuterol (PROVENTIL HFA,VENTOLIN HFA) 90 mcg/act inhaler Inhale 2 puffs every 4 (four) hours as needed for wheezing, Starting Sat 4/17/2021, Normal      baclofen 10 mg tablet Take 1 tablet (10 mg total) by mouth 3 (three) times a day for 4 days, Starting Wed 9/25/2019, Until Sun 9/29/2019, Print      cetirizine (ZyrTEC) 10 mg tablet Take 1 tablet (10 mg total) by mouth daily, Starting Fri 12/23/2022, Normal      citalopram (CeleXA) 20 mg tablet Take 20 mg by mouth daily, Historical Med      diphenhydrAMINE (BENADRYL) 25 mg tablet Take 1 tablet (25 mg total) by mouth every 6 (six) hours, Starting Fri 12/23/2022, Normal      docusate sodium (Colace) 100 mg capsule Take 1 capsule (100 mg total) by mouth 2 (two) times a day for 10 days, Starting Wed 11/1/2023, Until Sat 11/11/2023, Normal      fluticasone-vilanterol (BREO ELLIPTA) 200-25 MCG/INH inhaler Inhale 1 puff daily, Starting Mon 8/20/2018, Historical Med      hydrocortisone-pramoxine (PROCTOFOAM-HC) 1-1 % FOAM rectal foam Insert 1 applicator into the rectum 2 (two) times a  day for 7 days, Starting Wed 11/1/2023, Until Wed 11/8/2023, Normal      meclizine (ANTIVERT) 25 mg tablet Take 1 tablet (25 mg total) by mouth 3 (three) times a day as needed for dizziness, Starting Wed 6/15/2022, Normal      methocarbamol (ROBAXIN) 500 mg tablet Take 1 tablet (500 mg total) by mouth daily at bedtime as needed for muscle spasms for up to 7 days, Starting Thu 3/20/2025, Until Thu 3/27/2025 at 2359, Normal      naproxen (NAPROSYN) 500 mg tablet Take 1 tablet (500 mg total) by mouth 2 (two) times a day with meals, Starting Wed 9/30/2020, Normal      ondansetron (Zofran ODT) 4 mg disintegrating tablet Take 1 tablet (4 mg total) by mouth every 6 (six) hours as needed for nausea or vomiting, Starting Wed 6/15/2022, Normal      predniSONE 10 mg tablet Take by mouth, Starting Wed 8/15/2012, Historical Med      tiotropium (SPIRIVA RESPIMAT) 1.25 MCG/ACT AERS inhaler Inhale 2.5 mcg daily, Starting Mon 8/20/2018, Historical Med      traZODone (DESYREL) 50 mg tablet Take 50 mg by mouth, Historical Med      triamcinolone (KENALOG) 0.1 % cream Apply topically 2 (two) times a day for 7 days, Starting Sun 10/10/2021, Until Sun 10/17/2021, Normal       !! - Potential duplicate medications found. Please discuss with provider.        No discharge procedures on file.  ED SEPSIS DOCUMENTATION   Time reflects when diagnosis was documented in both MDM as applicable and the Disposition within this note       Time User Action Codes Description Comment    4/23/2025  6:22 AM Jim Roberts Add [R07.9] Chest pain                  Jim Roberts DO  04/24/25 2000

## 2025-04-27 ENCOUNTER — HOSPITAL ENCOUNTER (EMERGENCY)
Facility: HOSPITAL | Age: 45
Discharge: HOME | End: 2025-04-27
Attending: EMERGENCY MEDICINE
Payer: COMMERCIAL

## 2025-04-27 VITALS
HEART RATE: 77 BPM | SYSTOLIC BLOOD PRESSURE: 150 MMHG | DIASTOLIC BLOOD PRESSURE: 72 MMHG | RESPIRATION RATE: 16 BRPM | TEMPERATURE: 98 F | OXYGEN SATURATION: 97 %

## 2025-04-27 DIAGNOSIS — H00.021 HORDEOLUM INTERNUM OF RIGHT UPPER EYELID: Primary | ICD-10-CM

## 2025-04-27 PROCEDURE — 99283 EMERGENCY DEPT VISIT LOW MDM: CPT

## 2025-04-27 PROCEDURE — 99284 EMERGENCY DEPT VISIT MOD MDM: CPT | Performed by: EMERGENCY MEDICINE

## 2025-04-27 RX ORDER — ERYTHROMYCIN 5 MG/G
OINTMENT OPHTHALMIC
Qty: 3.5 G | Refills: 0 | Status: SHIPPED | OUTPATIENT
Start: 2025-04-27

## 2025-04-27 RX ORDER — ERYTHROMYCIN 5 MG/G
0.5 OINTMENT OPHTHALMIC ONCE
Status: COMPLETED | OUTPATIENT
Start: 2025-04-27 | End: 2025-04-27

## 2025-04-27 RX ADMIN — ERYTHROMYCIN 0.5 INCH: 5 OINTMENT OPHTHALMIC at 23:43

## 2025-04-28 NOTE — DISCHARGE INSTRUCTIONS
Albert,    You were seen at the [facility] department for a right-sided stye.  While you were here, we gave you some medications to help with the stye.  Please use warm compresses 2 times a day over the eye for about 10 minutes.  I am also sending you home with a prescription for erythromycin ointment.  Please use this daily at bedtime.    If you have any vision problems, fevers, or any other concerning symptoms, please come back to the emergency room.    Thank you for trusting us with your care.

## 2025-04-28 NOTE — ED PROVIDER NOTES
Time reflects when diagnosis was documented in both MDM as applicable and the Disposition within this note       Time User Action Codes Description Comment    4/27/2025 11:31 PM Kareem Shore Add [H00.021] Hordeolum internum of right upper eyelid           ED Disposition       ED Disposition   Discharge    Condition   Stable    Date/Time   Sun Apr 27, 2025 11:15 PM    Comment   Jose Capo Reyes discharge to home/self care.                   Assessment & Plan       Medical Decision Making  Patient is a 45-year-old gentleman presenting here today due to a mass over his eyelid.  Differentials that I had for this patient include hordeolum, chalazion, and unlikely blepharitis.    This condition just requires the patient to use warm compresses until it improves on its own.  I will be discharging the patient with good return precautions and erythromycin ointment.    Patient was discharged home with good return precautions    Risk  Prescription drug management.             Medications   erythromycin (ILOTYCIN) 0.5 % ophthalmic ointment 0.5 inch (0.5 inches Right Eye Given 4/27/25 3796)       ED Risk Strat Scores                    No data recorded        SBIRT 22yo+      Flowsheet Row Most Recent Value   Initial Alcohol Screen: US AUDIT-C     1. How often do you have a drink containing alcohol? 0 Filed at: 04/27/2025 2203   2. How many drinks containing alcohol do you have on a typical day you are drinking?  0 Filed at: 04/27/2025 2203   3a. Male UNDER 65: How often do you have five or more drinks on one occasion? 0 Filed at: 04/27/2025 2203   Audit-C Score 0 Filed at: 04/27/2025 2203   NELSON: How many times in the past year have you...    Used an illegal drug or used a prescription medication for non-medical reasons? Never Filed at: 04/27/2025 2203                            History of Present Illness       Chief Complaint   Patient presents with    Blepharitis     Right eye redness and swollen since Friday.        Past  Medical History:   Diagnosis Date    Asthma     Mitral valve prolapse       Past Surgical History:   Procedure Laterality Date    ROTATOR CUFF REPAIR Bilateral     2020 + 2017      No family history on file.   Social History     Tobacco Use    Smoking status: Never    Smokeless tobacco: Never   Vaping Use    Vaping status: Never Used   Substance Use Topics    Alcohol use: Not Currently     Comment: socially    Drug use: No      E-Cigarette/Vaping    E-Cigarette Use Never User       E-Cigarette/Vaping Substances    Nicotine No     THC No     CBD No     Flavoring No     Other No     Unknown No       I have reviewed and agree with the history as documented.     Patient 45-year-old male with no pertinent past medical history who presents here today due to a mass over his right eyelid.  Patient says that around Thursday he started having some irritation in his right eyelid.  Patient says that he started developing a bump over time and then Friday the bump became worse.  Patient says that he started taking some fluoroquinolone eyedrops that his daughter had lying around.  Patient says that today the mass has gone down and he feels better, he has no fevers, no problems with vision, no eye pain.  Patient just wants to be evaluated for his mass over his eye.          Review of Systems   Constitutional:  Negative for chills and fever.   HENT:  Negative for ear pain and sore throat.    Eyes:  Negative for photophobia, pain, discharge, redness, itching and visual disturbance.   Respiratory:  Negative for cough and shortness of breath.    Cardiovascular:  Negative for chest pain and palpitations.   Gastrointestinal:  Negative for abdominal pain and vomiting.   Genitourinary:  Negative for dysuria and hematuria.   Musculoskeletal:  Negative for arthralgias and back pain.   Skin:  Negative for color change and rash.   Neurological:  Negative for seizures and syncope.   All other systems reviewed and are  negative.          Objective       ED Triage Vitals [04/27/25 2201]   Temperature Pulse Blood Pressure Respirations SpO2 Patient Position - Orthostatic VS   98 °F (36.7 °C) 77 150/72 16 97 % Sitting      Temp Source Heart Rate Source BP Location FiO2 (%) Pain Score    Temporal Monitor Left arm -- 6      Vitals      Date and Time Temp Pulse SpO2 Resp BP Pain Score FACES Pain Rating User   04/27/25 2201 98 °F (36.7 °C) 77 97 % 16 150/72 6 -- CC            Physical Exam  Vitals and nursing note reviewed.   Constitutional:       General: He is not in acute distress.     Appearance: He is well-developed.   HENT:      Head: Normocephalic and atraumatic.      Comments: 1 cm x 0.25 cm hordeolum under right upper eyelid  Eyes:      Conjunctiva/sclera: Conjunctivae normal.   Cardiovascular:      Rate and Rhythm: Normal rate and regular rhythm.      Heart sounds: Murmur heard.   Pulmonary:      Effort: Pulmonary effort is normal. No respiratory distress.      Breath sounds: Normal breath sounds.   Abdominal:      Palpations: Abdomen is soft.      Tenderness: There is no abdominal tenderness.   Musculoskeletal:         General: No swelling.      Cervical back: Neck supple.   Skin:     General: Skin is warm and dry.      Capillary Refill: Capillary refill takes less than 2 seconds.   Neurological:      Mental Status: He is alert.   Psychiatric:         Mood and Affect: Mood normal.         Results Reviewed       None            No orders to display       Procedures    ED Medication and Procedure Management   Prior to Admission Medications   Prescriptions Last Dose Informant Patient Reported? Taking?   acetaminophen (TYLENOL) 500 mg tablet   No No   Sig: Take 1 tablet (500 mg total) by mouth every 6 (six) hours as needed for mild pain or moderate pain   Patient not taking: Reported on 8/25/2022   albuterol (2.5 mg/3 mL) 0.083 % nebulizer solution   No No   Sig: Take 3 mL (2.5 mg total) by nebulization every 6 (six) hours as  needed for wheezing or shortness of breath   albuterol (PROVENTIL HFA,VENTOLIN HFA) 90 mcg/act inhaler   Yes No   Sig: Inhale 1 puff every 6 (six) hours as needed   albuterol (PROVENTIL HFA,VENTOLIN HFA) 90 mcg/act inhaler   No No   Sig: Inhale 2 puffs every 4 (four) hours as needed for wheezing   baclofen 10 mg tablet   No No   Sig: Take 1 tablet (10 mg total) by mouth 3 (three) times a day for 4 days   cetirizine (ZyrTEC) 10 mg tablet   No No   Sig: Take 1 tablet (10 mg total) by mouth daily   citalopram (CeleXA) 20 mg tablet   Yes No   Sig: Take 20 mg by mouth daily   Patient not taking: Reported on 8/25/2022   diphenhydrAMINE (BENADRYL) 25 mg tablet   No No   Sig: Take 1 tablet (25 mg total) by mouth every 6 (six) hours   docusate sodium (Colace) 100 mg capsule   No No   Sig: Take 1 capsule (100 mg total) by mouth 2 (two) times a day for 10 days   fluticasone-vilanterol (BREO ELLIPTA) 200-25 MCG/INH inhaler   Yes No   Sig: Inhale 1 puff daily   hydrocortisone-pramoxine (PROCTOFOAM-HC) 1-1 % FOAM rectal foam   No No   Sig: Insert 1 applicator into the rectum 2 (two) times a day for 7 days   meclizine (ANTIVERT) 25 mg tablet   No No   Sig: Take 1 tablet (25 mg total) by mouth 3 (three) times a day as needed for dizziness   methocarbamol (ROBAXIN) 500 mg tablet   No No   Sig: Take 1 tablet (500 mg total) by mouth daily at bedtime as needed for muscle spasms for up to 7 days   naproxen (NAPROSYN) 500 mg tablet   No No   Sig: Take 1 tablet (500 mg total) by mouth 2 (two) times a day with meals   Patient not taking: Reported on 8/25/2022   ondansetron (Zofran ODT) 4 mg disintegrating tablet   No No   Sig: Take 1 tablet (4 mg total) by mouth every 6 (six) hours as needed for nausea or vomiting   Patient not taking: Reported on 8/25/2022   predniSONE 10 mg tablet   Yes No   Sig: Take by mouth   Patient not taking: Reported on 8/25/2022   tiotropium (SPIRIVA RESPIMAT) 1.25 MCG/ACT AERS inhaler   Yes No   Sig: Inhale 2.5  mcg daily   Patient not taking: Reported on 8/25/2022   traZODone (DESYREL) 50 mg tablet   Yes No   Sig: Take 50 mg by mouth   triamcinolone (KENALOG) 0.1 % cream   No No   Sig: Apply topically 2 (two) times a day for 7 days      Facility-Administered Medications: None     Discharge Medication List as of 4/27/2025 11:32 PM        START taking these medications    Details   erythromycin (ILOTYCIN) ophthalmic ointment Place a 1/2 inch ribbon of ointment into the lower eyelid., Normal           CONTINUE these medications which have NOT CHANGED    Details   acetaminophen (TYLENOL) 500 mg tablet Take 1 tablet (500 mg total) by mouth every 6 (six) hours as needed for mild pain or moderate pain, Starting Tue 7/30/2019, Print      albuterol (2.5 mg/3 mL) 0.083 % nebulizer solution Take 3 mL (2.5 mg total) by nebulization every 6 (six) hours as needed for wheezing or shortness of breath, Starting Thu 3/28/2024, Normal      !! albuterol (PROVENTIL HFA,VENTOLIN HFA) 90 mcg/act inhaler Inhale 1 puff every 6 (six) hours as needed, Starting Mon 3/11/2019, Historical Med      !! albuterol (PROVENTIL HFA,VENTOLIN HFA) 90 mcg/act inhaler Inhale 2 puffs every 4 (four) hours as needed for wheezing, Starting Sat 4/17/2021, Normal      baclofen 10 mg tablet Take 1 tablet (10 mg total) by mouth 3 (three) times a day for 4 days, Starting Wed 9/25/2019, Until Sun 9/29/2019, Print      cetirizine (ZyrTEC) 10 mg tablet Take 1 tablet (10 mg total) by mouth daily, Starting Fri 12/23/2022, Normal      citalopram (CeleXA) 20 mg tablet Take 20 mg by mouth daily, Historical Med      diphenhydrAMINE (BENADRYL) 25 mg tablet Take 1 tablet (25 mg total) by mouth every 6 (six) hours, Starting Fri 12/23/2022, Normal      docusate sodium (Colace) 100 mg capsule Take 1 capsule (100 mg total) by mouth 2 (two) times a day for 10 days, Starting Wed 11/1/2023, Until Sat 11/11/2023, Normal      fluticasone-vilanterol (BREO ELLIPTA) 200-25 MCG/INH inhaler  Inhale 1 puff daily, Starting Mon 8/20/2018, Historical Med      hydrocortisone-pramoxine (PROCTOFOAM-HC) 1-1 % FOAM rectal foam Insert 1 applicator into the rectum 2 (two) times a day for 7 days, Starting Wed 11/1/2023, Until Wed 11/8/2023, Normal      meclizine (ANTIVERT) 25 mg tablet Take 1 tablet (25 mg total) by mouth 3 (three) times a day as needed for dizziness, Starting Wed 6/15/2022, Normal      methocarbamol (ROBAXIN) 500 mg tablet Take 1 tablet (500 mg total) by mouth daily at bedtime as needed for muscle spasms for up to 7 days, Starting Thu 3/20/2025, Until Thu 3/27/2025 at 2359, Normal      naproxen (NAPROSYN) 500 mg tablet Take 1 tablet (500 mg total) by mouth 2 (two) times a day with meals, Starting Wed 9/30/2020, Normal      ondansetron (Zofran ODT) 4 mg disintegrating tablet Take 1 tablet (4 mg total) by mouth every 6 (six) hours as needed for nausea or vomiting, Starting Wed 6/15/2022, Normal      predniSONE 10 mg tablet Take by mouth, Starting Wed 8/15/2012, Historical Med      tiotropium (SPIRIVA RESPIMAT) 1.25 MCG/ACT AERS inhaler Inhale 2.5 mcg daily, Starting Mon 8/20/2018, Historical Med      traZODone (DESYREL) 50 mg tablet Take 50 mg by mouth, Historical Med      triamcinolone (KENALOG) 0.1 % cream Apply topically 2 (two) times a day for 7 days, Starting Sun 10/10/2021, Until Sun 10/17/2021, Normal       !! - Potential duplicate medications found. Please discuss with provider.        No discharge procedures on file.  ED SEPSIS DOCUMENTATION   Time reflects when diagnosis was documented in both MDM as applicable and the Disposition within this note       Time User Action Codes Description Comment    4/27/2025 11:31 PM Kareem Shore Add [H00.021] Hordeolum internum of right upper eyelid                  Kareem Shore MD  04/28/25 0639

## 2025-04-30 NOTE — ED ATTENDING ATTESTATION
4/27/2025  I, Romeo Gomez MD, saw and evaluated the patient. I have discussed the patient with the resident/non-physician practitioner and agree with the resident's/non-physician practitioner's findings, Plan of Care, and MDM as documented in the resident's/non-physician practitioner's note, except where noted. All available labs and Radiology studies were reviewed.  I was present for key portions of any procedure(s) performed by the resident/non-physician practitioner and I was immediately available to provide assistance.       At this point I agree with the current assessment done in the Emergency Department.  I have conducted an independent evaluation of this patient a history and physical is as follows:    ED Course     Impression right upper eyelid swelling  Differential diagnosis: Stye, blepharitis, conjunctivitis    Patient's presentation and physical examination consistent with stye of left upper eyelid there does not appear to be any evidence of preseptal cellulitis orbital cellulitis no pain with extraocular motion no visual field defects.     Plan to treat with warm compresses topical antibiotics and follow-up with PCP as outpatient return precautions given    Critical Care Time  Procedures

## 2025-08-15 ENCOUNTER — APPOINTMENT (OUTPATIENT)
Dept: URGENT CARE | Facility: MEDICAL CENTER | Age: 45
End: 2025-08-15